# Patient Record
Sex: MALE | Race: BLACK OR AFRICAN AMERICAN | URBAN - METROPOLITAN AREA
[De-identification: names, ages, dates, MRNs, and addresses within clinical notes are randomized per-mention and may not be internally consistent; named-entity substitution may affect disease eponyms.]

---

## 2020-05-22 DIAGNOSIS — Z20.828 EXPOSURE TO SARS-ASSOCIATED CORONAVIRUS: Primary | ICD-10-CM

## 2020-05-22 PROCEDURE — NC001 PR NO CHARGE: Performed by: INTERNAL MEDICINE

## 2020-05-23 DIAGNOSIS — Z20.828 EXPOSURE TO SARS-ASSOCIATED CORONAVIRUS: ICD-10-CM

## 2020-05-23 PROCEDURE — U0003 INFECTIOUS AGENT DETECTION BY NUCLEIC ACID (DNA OR RNA); SEVERE ACUTE RESPIRATORY SYNDROME CORONAVIRUS 2 (SARS-COV-2) (CORONAVIRUS DISEASE [COVID-19]), AMPLIFIED PROBE TECHNIQUE, MAKING USE OF HIGH THROUGHPUT TECHNOLOGIES AS DESCRIBED BY CMS-2020-01-R: HCPCS

## 2020-05-26 LAB — SARS-COV-2 RNA SPEC QL NAA+PROBE: NOT DETECTED

## 2021-06-21 DIAGNOSIS — I10 HYPERTENSION, UNSPECIFIED TYPE: ICD-10-CM

## 2021-06-21 DIAGNOSIS — Z00.00 PHYSICAL EXAM: Primary | ICD-10-CM

## 2021-06-28 ENCOUNTER — APPOINTMENT (OUTPATIENT)
Dept: LAB | Facility: AMBULARY SURGERY CENTER | Age: 51
End: 2021-06-28
Payer: COMMERCIAL

## 2021-06-28 ENCOUNTER — OFFICE VISIT (OUTPATIENT)
Dept: FAMILY MEDICINE CLINIC | Facility: CLINIC | Age: 51
End: 2021-06-28
Payer: COMMERCIAL

## 2021-06-28 VITALS
DIASTOLIC BLOOD PRESSURE: 100 MMHG | SYSTOLIC BLOOD PRESSURE: 140 MMHG | HEART RATE: 84 BPM | RESPIRATION RATE: 17 BRPM | BODY MASS INDEX: 41.84 KG/M2 | OXYGEN SATURATION: 96 % | HEIGHT: 69 IN | WEIGHT: 282.5 LBS | TEMPERATURE: 98.2 F

## 2021-06-28 DIAGNOSIS — Z28.21 TETANUS, DIPHTHERIA, AND ACELLULAR PERTUSSIS (TDAP) VACCINATION DECLINED: ICD-10-CM

## 2021-06-28 DIAGNOSIS — Z13.31 DEPRESSION SCREENING NEGATIVE: ICD-10-CM

## 2021-06-28 DIAGNOSIS — Z28.21 COVID-19 VACCINATION DECLINED: ICD-10-CM

## 2021-06-28 DIAGNOSIS — I10 HYPERTENSION, UNSPECIFIED TYPE: ICD-10-CM

## 2021-06-28 DIAGNOSIS — Z00.00 PHYSICAL EXAM: ICD-10-CM

## 2021-06-28 DIAGNOSIS — Z00.00 ANNUAL PHYSICAL EXAM: Primary | ICD-10-CM

## 2021-06-28 PROBLEM — I71.2 THORACIC AORTIC ANEURYSM WITHOUT RUPTURE (HCC): Status: ACTIVE | Noted: 2021-06-28

## 2021-06-28 PROBLEM — I71.20 THORACIC AORTIC ANEURYSM WITHOUT RUPTURE: Status: ACTIVE | Noted: 2021-06-28

## 2021-06-28 LAB
ALBUMIN SERPL BCP-MCNC: 3.3 G/DL (ref 3.5–5)
ALP SERPL-CCNC: 80 U/L (ref 46–116)
ALT SERPL W P-5'-P-CCNC: 41 U/L (ref 12–78)
ANION GAP SERPL CALCULATED.3IONS-SCNC: 7 MMOL/L (ref 4–13)
AST SERPL W P-5'-P-CCNC: 27 U/L (ref 5–45)
BASOPHILS # BLD AUTO: 0.02 THOUSANDS/ΜL (ref 0–0.1)
BASOPHILS NFR BLD AUTO: 0 % (ref 0–1)
BILIRUB SERPL-MCNC: 0.51 MG/DL (ref 0.2–1)
BUN SERPL-MCNC: 14 MG/DL (ref 5–25)
CALCIUM ALBUM COR SERPL-MCNC: 9.5 MG/DL (ref 8.3–10.1)
CALCIUM SERPL-MCNC: 8.9 MG/DL (ref 8.3–10.1)
CHLORIDE SERPL-SCNC: 105 MMOL/L (ref 100–108)
CHOLEST SERPL-MCNC: 159 MG/DL (ref 50–200)
CO2 SERPL-SCNC: 23 MMOL/L (ref 21–32)
CREAT SERPL-MCNC: 1.26 MG/DL (ref 0.6–1.3)
EOSINOPHIL # BLD AUTO: 0.28 THOUSAND/ΜL (ref 0–0.61)
EOSINOPHIL NFR BLD AUTO: 6 % (ref 0–6)
ERYTHROCYTE [DISTWIDTH] IN BLOOD BY AUTOMATED COUNT: 14.5 % (ref 11.6–15.1)
GFR SERPL CREATININE-BSD FRML MDRD: 77 ML/MIN/1.73SQ M
GLUCOSE P FAST SERPL-MCNC: 100 MG/DL (ref 65–99)
HCT VFR BLD AUTO: 49.2 % (ref 36.5–49.3)
HDLC SERPL-MCNC: 46 MG/DL
HGB BLD-MCNC: 16.1 G/DL (ref 12–17)
IMM GRANULOCYTES # BLD AUTO: 0.02 THOUSAND/UL (ref 0–0.2)
IMM GRANULOCYTES NFR BLD AUTO: 0 % (ref 0–2)
LDLC SERPL CALC-MCNC: 103 MG/DL (ref 0–100)
LYMPHOCYTES # BLD AUTO: 1.85 THOUSANDS/ΜL (ref 0.6–4.47)
LYMPHOCYTES NFR BLD AUTO: 39 % (ref 14–44)
MCH RBC QN AUTO: 26.4 PG (ref 26.8–34.3)
MCHC RBC AUTO-ENTMCNC: 32.7 G/DL (ref 31.4–37.4)
MCV RBC AUTO: 81 FL (ref 82–98)
MONOCYTES # BLD AUTO: 0.49 THOUSAND/ΜL (ref 0.17–1.22)
MONOCYTES NFR BLD AUTO: 10 % (ref 4–12)
NEUTROPHILS # BLD AUTO: 2.15 THOUSANDS/ΜL (ref 1.85–7.62)
NEUTS SEG NFR BLD AUTO: 45 % (ref 43–75)
NONHDLC SERPL-MCNC: 113 MG/DL
NRBC BLD AUTO-RTO: 0 /100 WBCS
PLATELET # BLD AUTO: 235 THOUSANDS/UL (ref 149–390)
PMV BLD AUTO: 10.3 FL (ref 8.9–12.7)
POTASSIUM SERPL-SCNC: 3.5 MMOL/L (ref 3.5–5.3)
PROT SERPL-MCNC: 8 G/DL (ref 6.4–8.2)
RBC # BLD AUTO: 6.09 MILLION/UL (ref 3.88–5.62)
SODIUM SERPL-SCNC: 135 MMOL/L (ref 136–145)
TRIGL SERPL-MCNC: 49 MG/DL
TSH SERPL DL<=0.05 MIU/L-ACNC: 1.5 UIU/ML (ref 0.36–3.74)
WBC # BLD AUTO: 4.81 THOUSAND/UL (ref 4.31–10.16)

## 2021-06-28 PROCEDURE — 80061 LIPID PANEL: CPT

## 2021-06-28 PROCEDURE — 36415 COLL VENOUS BLD VENIPUNCTURE: CPT

## 2021-06-28 PROCEDURE — 84443 ASSAY THYROID STIM HORMONE: CPT

## 2021-06-28 PROCEDURE — 99396 PREV VISIT EST AGE 40-64: CPT | Performed by: INTERNAL MEDICINE

## 2021-06-28 PROCEDURE — 3008F BODY MASS INDEX DOCD: CPT | Performed by: INTERNAL MEDICINE

## 2021-06-28 PROCEDURE — 3725F SCREEN DEPRESSION PERFORMED: CPT | Performed by: INTERNAL MEDICINE

## 2021-06-28 PROCEDURE — 85025 COMPLETE CBC W/AUTO DIFF WBC: CPT

## 2021-06-28 PROCEDURE — 1036F TOBACCO NON-USER: CPT | Performed by: INTERNAL MEDICINE

## 2021-06-28 PROCEDURE — 80053 COMPREHEN METABOLIC PANEL: CPT

## 2021-06-28 RX ORDER — AMLODIPINE BESYLATE 5 MG/1
5 TABLET ORAL DAILY
Qty: 30 TABLET | Refills: 5 | Status: SHIPPED | OUTPATIENT
Start: 2021-06-28 | End: 2022-01-14

## 2021-06-28 NOTE — PATIENT INSTRUCTIONS

## 2021-06-28 NOTE — PROGRESS NOTES
[facility]ICINE    NAME: Tabby Kenyon Sr   AGE: 54 y o  SEX: male  : 1970     DATE: 2021     Assessment and Plan:     Problem List Items Addressed This Visit        Cardiovascular and Mediastinum    Hypertension    Relevant Medications    amLODIPine (NORVASC) 5 mg tablet      Other Visit Diagnoses     Annual physical exam    -  Primary    BMI 40 0-44 9, adult ([facility] [address] )        COVID-19 vaccination declined        Tetanus, diphtheria, and acellular pertussis (Tdap) vaccination declined        Depression screening negative              Immunizations and preventive care screenings were discussed with patient today  Appropriate education was printed on patient's after visit summary  Counseling:  Alcohol/drug use: discussed moderation in alcohol intake, the recommendations for healthy alcohol use, and avoidance of illicit drug use  · Exercise: the importance of regular exercise/physical activity was discussed  Recommend exercise 3-5 times per week for at least 30 minutes  all labs reviewed with patient    Return in about 1 year (around 2022) for Annual physical      Chief Complaint:     Chief Complaint   Patient presents with    Physical Exam      History of Present Illness:     Adult Annual Physical   Patient here for a comprehensive physical exam  The patient reports no problems  Diet and Physical Activity  · Diet/Nutrition: low carb diet  · Exercise: no formal exercise  Depression Screening  PHQ-9 Depression Screening    PHQ-9:   Frequency of the following problems over the past two weeks:      Little interest or pleasure in doing things: 0 - not at all  Feeling down, depressed, or hopeless: 0 - not at all  PHQ-2 Score: 0       General Health  · Sleep: sleeps well  · Hearing: normal - bilateral   · Vision: no vision problems  · Dental: regular dental visits          Health  · Symptoms include: none     Review of Systems:     Review of Systems   Constitutional: Negative  HENT: Negative  Eyes: Negative  Respiratory: Negative  Cardiovascular: Negative  Gastrointestinal: Negative  Genitourinary: Negative  Musculoskeletal: Negative  Neurological: Negative  Hematological: Negative  Psychiatric/Behavioral: Negative  Past Medical History:     Past Medical History:   Diagnosis Date    Aneurysm of thoracic aorta (HCC)     Cardiomegaly     HBP (high blood pressure)     Hyperglycemia     Low back pain     Meralgia paresthetica, right lower limb     meralgia paresthetica of right leg    Obesity     Seasonal allergies     Vitamin D deficiency     Wears glasses       Past Surgical History:     Past Surgical History:   Procedure Laterality Date    NO PAST SURGERIES        Family History:     Family History   Problem Relation Age of Onset    Breast cancer Sister     Diabetes Brother     Blindness Mother     No Known Problems Father     Sickle cell trait Son     Stroke Maternal Aunt     Down syndrome Sister       Social History:     Social History     Socioeconomic History    Marital status: /Civil Union     Spouse name: None    Number of children: None    Years of education: None    Highest education level: None   Occupational History    None   Tobacco Use    Smoking status: Never Smoker    Smokeless tobacco: Never Used   Vaping Use    Vaping Use: Never used   Substance and Sexual Activity    Alcohol use:  Yes    Drug use: Never    Sexual activity: Yes     Partners: Female   Other Topics Concern    None   Social History Narrative    Most recent tobacco use screenin2019    Do you currently or have you served in the [employer]: No    Were you activated, into active duty, as a member of the [employer] or as a Reservist: No    Marital status:     Sexual orientation: Heterosexual    Exercise level: None    Diet: Regular Alcohol intake: Moderate    Caffeine intake: None    Chewing tobacco: none    Illicit drugs: none    Guns present in home: No    Seat belts used routinely: Yes    Sunscreen used routinely: No    Smoke alarm in home: Yes    Advance directive: No    Live alone or with others: with others    Are there stairs in your home: Yes    International travel: yes    Pets: No     Social Determinants of Health     Financial Resource Strain:     Difficulty of Paying Living Expenses:    Food Insecurity:     Worried About Running Out of Food in the Last Year:     Ran Out of Food in the Last Year:    Transportation Needs:     Lack of Transportation (Medical):  Lack of Transportation (Non-Medical):    Physical Activity:     Days of Exercise per Week:     Minutes of Exercise per Session:    Stress:     Feeling of Stress :    Social Connections:     Frequency of Communication with Friends and Family:     Frequency of Social Gatherings with Friends and Family:     Attends Rastafari Services:     Active Member of Clubs or Organizations:     Attends Club or Organization Meetings:     Marital Status:    Intimate Partner Violence:     Fear of Current or Ex-Partner:     Emotionally Abused:     Physically Abused:     Sexually Abused:       Current Medications:     Current Outpatient Medications   Medication Sig Dispense Refill    amLODIPine (NORVASC) 5 mg tablet Take 1 tablet (5 mg total) by mouth daily 30 tablet 5     No current facility-administered medications for this visit  Allergies:     No Known Allergies   Physical Exam:     /100 (BP Location: Right arm, Patient Position: Sitting, Cuff Size: Adult)   Pulse 84   Temp 98 2 °F (36 8 °C) (Temporal)   Resp 17   Ht 5' 9" (1 753 m)   Wt 128 kg (282 lb 8 oz)   SpO2 96%   BMI 41 72 kg/m²     Physical Exam  Constitutional:       Appearance: He is obese  HENT:      Head: Normocephalic and atraumatic        Right Ear: External ear normal       Left Ear: External ear normal       Nose: Nose normal       Mouth/Throat:      Pharynx: Oropharynx is clear  Eyes:      Extraocular Movements: Extraocular movements intact  Pupils: Pupils are equal, round, and reactive to light  Cardiovascular:      Rate and Rhythm: Normal rate and regular rhythm  Pulmonary:      Effort: Pulmonary effort is normal       Breath sounds: Normal breath sounds  Abdominal:      General: Abdomen is flat  Musculoskeletal:         General: Normal range of motion  Cervical back: Normal range of motion and neck supple  Skin:     General: Skin is warm  Capillary Refill: Capillary refill takes less than 2 seconds  Neurological:      General: No focal deficit present  Mental Status: He is alert and oriented to person, place, and time  Psychiatric:         Mood and Affect: Mood normal          Behavior: Behavior normal          Thought Content: Thought content normal          Judgment: Judgment normal           Zoraida Nageotte, MD  [facility]   BMI Counseling: Body mass index is 41 72 kg/m²  The BMI is above normal  Nutrition recommendations include reducing portion sizes, decreasing overall calorie intake, 3-5 servings of fruits/vegetables daily, reducing fast food intake, consuming healthier snacks, decreasing soda and/or juice intake, moderation in carbohydrate intake, increasing intake of lean protein, reducing intake of saturated fat and trans fat and reducing intake of cholesterol  Exercise recommendations include vigorous aerobic physical activity for 75 minutes/week, joining a gym and strength training exercises

## 2021-12-11 ENCOUNTER — TELEPHONE (OUTPATIENT)
Dept: FAMILY MEDICINE CLINIC | Facility: CLINIC | Age: 51
End: 2021-12-11

## 2021-12-11 DIAGNOSIS — Z20.828 EXPOSURE TO SARS-ASSOCIATED CORONAVIRUS: Primary | ICD-10-CM

## 2021-12-12 PROCEDURE — U0003 INFECTIOUS AGENT DETECTION BY NUCLEIC ACID (DNA OR RNA); SEVERE ACUTE RESPIRATORY SYNDROME CORONAVIRUS 2 (SARS-COV-2) (CORONAVIRUS DISEASE [COVID-19]), AMPLIFIED PROBE TECHNIQUE, MAKING USE OF HIGH THROUGHPUT TECHNOLOGIES AS DESCRIBED BY CMS-2020-01-R: HCPCS | Performed by: INTERNAL MEDICINE

## 2021-12-12 PROCEDURE — U0005 INFEC AGEN DETEC AMPLI PROBE: HCPCS | Performed by: INTERNAL MEDICINE

## 2021-12-14 ENCOUNTER — TELEMEDICINE (OUTPATIENT)
Dept: FAMILY MEDICINE CLINIC | Facility: CLINIC | Age: 51
End: 2021-12-14
Payer: COMMERCIAL

## 2021-12-14 DIAGNOSIS — E66.01 CLASS 3 SEVERE OBESITY DUE TO EXCESS CALORIES WITH SERIOUS COMORBIDITY AND BODY MASS INDEX (BMI) OF 40.0 TO 44.9 IN ADULT (HCC): ICD-10-CM

## 2021-12-14 DIAGNOSIS — U07.1 TELEHEALTH ENCOUNTER FOR CONFIRMED COVID-19: Primary | ICD-10-CM

## 2021-12-14 DIAGNOSIS — R11.0 NAUSEA: ICD-10-CM

## 2021-12-14 DIAGNOSIS — I10 HYPERTENSION, UNSPECIFIED TYPE: ICD-10-CM

## 2021-12-14 PROBLEM — E66.813 CLASS 3 SEVERE OBESITY IN ADULT (HCC): Status: ACTIVE | Noted: 2021-12-14

## 2021-12-14 PROCEDURE — 1036F TOBACCO NON-USER: CPT | Performed by: INTERNAL MEDICINE

## 2021-12-14 PROCEDURE — 99214 OFFICE O/P EST MOD 30 MIN: CPT | Performed by: INTERNAL MEDICINE

## 2021-12-14 RX ORDER — ACETAMINOPHEN 325 MG/1
650 TABLET ORAL ONCE AS NEEDED
Status: CANCELLED | OUTPATIENT
Start: 2021-12-14

## 2021-12-14 RX ORDER — SODIUM CHLORIDE 9 MG/ML
20 INJECTION, SOLUTION INTRAVENOUS ONCE
Status: CANCELLED | OUTPATIENT
Start: 2021-12-14

## 2021-12-14 RX ORDER — ONDANSETRON 2 MG/ML
4 INJECTION INTRAMUSCULAR; INTRAVENOUS ONCE AS NEEDED
Status: CANCELLED | OUTPATIENT
Start: 2021-12-14

## 2021-12-14 RX ORDER — ALBUTEROL SULFATE 90 UG/1
3 AEROSOL, METERED RESPIRATORY (INHALATION) ONCE AS NEEDED
Status: CANCELLED | OUTPATIENT
Start: 2021-12-14

## 2021-12-14 RX ORDER — ONDANSETRON 4 MG/1
4 TABLET, FILM COATED ORAL EVERY 8 HOURS PRN
Qty: 30 TABLET | Refills: 0 | Status: SHIPPED | OUTPATIENT
Start: 2021-12-14 | End: 2022-03-09

## 2021-12-16 ENCOUNTER — APPOINTMENT (EMERGENCY)
Dept: CT IMAGING | Facility: HOSPITAL | Age: 51
End: 2021-12-16
Payer: COMMERCIAL

## 2021-12-16 ENCOUNTER — HOSPITAL ENCOUNTER (INPATIENT)
Facility: HOSPITAL | Age: 51
LOS: 11 days | Discharge: LEFT BEFORE DISCHARGE | DRG: 177 | End: 2021-12-27
Attending: INTERNAL MEDICINE | Admitting: INTERNAL MEDICINE
Payer: COMMERCIAL

## 2021-12-16 ENCOUNTER — HOSPITAL ENCOUNTER (OUTPATIENT)
Dept: INFUSION CENTER | Facility: HOSPITAL | Age: 51
Discharge: HOME | End: 2021-12-16
Attending: INTERNAL MEDICINE

## 2021-12-16 ENCOUNTER — APPOINTMENT (EMERGENCY)
Dept: RADIOLOGY | Facility: HOSPITAL | Age: 51
End: 2021-12-16
Payer: COMMERCIAL

## 2021-12-16 ENCOUNTER — HOSPITAL ENCOUNTER (EMERGENCY)
Facility: HOSPITAL | Age: 51
End: 2021-12-16
Attending: INTERNAL MEDICINE
Payer: COMMERCIAL

## 2021-12-16 VITALS
DIASTOLIC BLOOD PRESSURE: 81 MMHG | HEART RATE: 125 BPM | OXYGEN SATURATION: 95 % | SYSTOLIC BLOOD PRESSURE: 169 MMHG | TEMPERATURE: 99.7 F | RESPIRATION RATE: 62 BRPM

## 2021-12-16 VITALS
TEMPERATURE: 98.1 F | SYSTOLIC BLOOD PRESSURE: 127 MMHG | OXYGEN SATURATION: 93 % | DIASTOLIC BLOOD PRESSURE: 83 MMHG | HEART RATE: 106 BPM | RESPIRATION RATE: 48 BRPM

## 2021-12-16 DIAGNOSIS — U07.1 COVID-19: ICD-10-CM

## 2021-12-16 DIAGNOSIS — U07.1 ACUTE HYPOXEMIC RESPIRATORY FAILURE DUE TO COVID-19 (HCC): Primary | ICD-10-CM

## 2021-12-16 DIAGNOSIS — U07.1 PNEUMONIA DUE TO COVID-19 VIRUS: ICD-10-CM

## 2021-12-16 DIAGNOSIS — I10 HYPERTENSION, UNSPECIFIED TYPE: ICD-10-CM

## 2021-12-16 DIAGNOSIS — R07.9 CHEST PAIN, UNSPECIFIED TYPE: ICD-10-CM

## 2021-12-16 DIAGNOSIS — R06.02 SHORTNESS OF BREATH: Primary | ICD-10-CM

## 2021-12-16 DIAGNOSIS — J96.01 ACUTE RESPIRATORY FAILURE WITH HYPOXEMIA (HCC): Primary | ICD-10-CM

## 2021-12-16 DIAGNOSIS — E66.01 CLASS 3 SEVERE OBESITY DUE TO EXCESS CALORIES WITH SERIOUS COMORBIDITY AND BODY MASS INDEX (BMI) OF 40.0 TO 44.9 IN ADULT (HCC): ICD-10-CM

## 2021-12-16 DIAGNOSIS — J12.82 PNEUMONIA DUE TO COVID-19 VIRUS: ICD-10-CM

## 2021-12-16 DIAGNOSIS — M54.9 ACUTE BACK PAIN, UNSPECIFIED BACK LOCATION, UNSPECIFIED BACK PAIN LATERALITY: ICD-10-CM

## 2021-12-16 DIAGNOSIS — J96.01 ACUTE HYPOXEMIC RESPIRATORY FAILURE DUE TO COVID-19 (HCC): Primary | ICD-10-CM

## 2021-12-16 PROBLEM — E66.813 CLASS 3 SEVERE OBESITY IN ADULT (HCC): Chronic | Status: ACTIVE | Noted: 2021-12-14

## 2021-12-16 PROBLEM — I71.20 THORACIC AORTIC ANEURYSM WITHOUT RUPTURE: Chronic | Status: ACTIVE | Noted: 2021-06-28

## 2021-12-16 PROBLEM — I71.2 THORACIC AORTIC ANEURYSM WITHOUT RUPTURE (HCC): Chronic | Status: ACTIVE | Noted: 2021-06-28

## 2021-12-16 LAB
2HR DELTA HS TROPONIN: 0 NG/L
4HR DELTA HS TROPONIN: 2 NG/L
ALBUMIN SERPL BCP-MCNC: 3.7 G/DL (ref 3.4–4.8)
ALP SERPL-CCNC: 49.3 U/L (ref 10–129)
ALT SERPL W P-5'-P-CCNC: 27 U/L (ref 5–63)
ANION GAP SERPL CALCULATED.3IONS-SCNC: 9 MMOL/L (ref 4–13)
AST SERPL W P-5'-P-CCNC: 40 U/L (ref 15–41)
ATRIAL RATE: 120 BPM
BASE EX.OXY STD BLDV CALC-SCNC: 84.4 % (ref 60–80)
BASE EXCESS BLDV CALC-SCNC: 1.4 MMOL/L
BASOPHILS # BLD AUTO: 0.03 THOUSANDS/ΜL (ref 0–0.1)
BASOPHILS NFR BLD AUTO: 1 % (ref 0–1)
BILIRUB SERPL-MCNC: 0.72 MG/DL (ref 0.3–1.2)
BNP SERPL-MCNC: 15.8 PG/ML (ref 1–100)
BUN SERPL-MCNC: 20 MG/DL (ref 6–20)
CALCIUM SERPL-MCNC: 8.6 MG/DL (ref 8.4–10.2)
CARDIAC TROPONIN I PNL SERPL HS: 18 NG/L
CARDIAC TROPONIN I PNL SERPL HS: 18 NG/L
CARDIAC TROPONIN I PNL SERPL HS: 20 NG/L
CHLORIDE SERPL-SCNC: 97 MMOL/L (ref 96–108)
CK MB SERPL-MCNC: 0.7 % (ref 0–2.5)
CK MB SERPL-MCNC: 3.3 NG/ML (ref 0.1–5.9)
CK SERPL-CCNC: 472.1 U/L (ref 49–397)
CO2 SERPL-SCNC: 27 MMOL/L (ref 22–33)
CREAT SERPL-MCNC: 1.33 MG/DL (ref 0.5–1.2)
CRP SERPL QL: 18 MG/DL (ref 0–1)
D DIMER PPP FEU-MCNC: 4 UG/ML FEU
EOSINOPHIL # BLD AUTO: 0.01 THOUSAND/ΜL (ref 0–0.61)
EOSINOPHIL NFR BLD AUTO: 0 % (ref 0–6)
ERYTHROCYTE [DISTWIDTH] IN BLOOD BY AUTOMATED COUNT: 14 % (ref 11.6–15.1)
GFR SERPL CREATININE-BSD FRML MDRD: 62 ML/MIN/1.73SQ M
GLUCOSE SERPL-MCNC: 183 MG/DL (ref 65–140)
HCO3 BLDV-SCNC: 25.3 MMOL/L (ref 24–30)
HCT VFR BLD AUTO: 50.7 % (ref 36.5–49.3)
HGB BLD-MCNC: 17.2 G/DL (ref 12–17)
IMM GRANULOCYTES # BLD AUTO: 0.01 THOUSAND/UL (ref 0–0.2)
IMM GRANULOCYTES NFR BLD AUTO: 0 % (ref 0–2)
LACTATE SERPL-SCNC: 1.5 MMOL/L (ref 0–2)
LYMPHOCYTES # BLD AUTO: 0.66 THOUSANDS/ΜL (ref 0.6–4.47)
LYMPHOCYTES NFR BLD AUTO: 13 % (ref 14–44)
MCH RBC QN AUTO: 26.6 PG (ref 26.8–34.3)
MCHC RBC AUTO-ENTMCNC: 33.9 G/DL (ref 31.4–37.4)
MCV RBC AUTO: 78 FL (ref 82–98)
MONOCYTES # BLD AUTO: 0.41 THOUSAND/ΜL (ref 0.17–1.22)
MONOCYTES NFR BLD AUTO: 8 % (ref 4–12)
NEUTROPHILS # BLD AUTO: 3.99 THOUSANDS/ΜL (ref 1.85–7.62)
NEUTS SEG NFR BLD AUTO: 78 % (ref 43–75)
NRBC BLD AUTO-RTO: 0 /100 WBCS
O2 CT BLDV-SCNC: 21.7 ML/DL
P AXIS: 50 DEGREES
PCO2 BLDV: 38 MM HG (ref 42–50)
PH BLDV: 7.44 [PH] (ref 7.3–7.4)
PLATELET # BLD AUTO: 182 THOUSANDS/UL (ref 149–390)
PMV BLD AUTO: 10.2 FL (ref 8.9–12.7)
PO2 BLDV: 48.8 MM HG (ref 35–45)
POTASSIUM SERPL-SCNC: 3.7 MMOL/L (ref 3.5–5)
PR INTERVAL: 127 MS
PROCALCITONIN SERPL-MCNC: 0.39 NG/ML
PROT SERPL-MCNC: 7.8 G/DL (ref 6.4–8.3)
QRS AXIS: 61 DEGREES
QRSD INTERVAL: 85 MS
QT INTERVAL: 340 MS
QTC INTERVAL: 483 MS
RBC # BLD AUTO: 6.47 MILLION/UL (ref 3.88–5.62)
SODIUM SERPL-SCNC: 133 MMOL/L (ref 133–145)
T WAVE AXIS: 16 DEGREES
VENTRICULAR RATE: 121 BPM
WBC # BLD AUTO: 5.11 THOUSAND/UL (ref 4.31–10.16)

## 2021-12-16 PROCEDURE — 82948 REAGENT STRIP/BLOOD GLUCOSE: CPT

## 2021-12-16 PROCEDURE — 36415 COLL VENOUS BLD VENIPUNCTURE: CPT | Performed by: PHYSICIAN ASSISTANT

## 2021-12-16 PROCEDURE — 99285 EMERGENCY DEPT VISIT HI MDM: CPT | Performed by: PHYSICIAN ASSISTANT

## 2021-12-16 PROCEDURE — 99285 EMERGENCY DEPT VISIT HI MDM: CPT

## 2021-12-16 PROCEDURE — 83605 ASSAY OF LACTIC ACID: CPT | Performed by: PHYSICIAN ASSISTANT

## 2021-12-16 PROCEDURE — 87040 BLOOD CULTURE FOR BACTERIA: CPT | Performed by: NURSE PRACTITIONER

## 2021-12-16 PROCEDURE — 83880 ASSAY OF NATRIURETIC PEPTIDE: CPT | Performed by: PHYSICIAN ASSISTANT

## 2021-12-16 PROCEDURE — 86140 C-REACTIVE PROTEIN: CPT | Performed by: NURSE PRACTITIONER

## 2021-12-16 PROCEDURE — 71275 CT ANGIOGRAPHY CHEST: CPT

## 2021-12-16 PROCEDURE — 94760 N-INVAS EAR/PLS OXIMETRY 1: CPT

## 2021-12-16 PROCEDURE — 82553 CREATINE MB FRACTION: CPT | Performed by: NURSE PRACTITIONER

## 2021-12-16 PROCEDURE — 85379 FIBRIN DEGRADATION QUANT: CPT | Performed by: PHYSICIAN ASSISTANT

## 2021-12-16 PROCEDURE — 80053 COMPREHEN METABOLIC PANEL: CPT | Performed by: PHYSICIAN ASSISTANT

## 2021-12-16 PROCEDURE — 84484 ASSAY OF TROPONIN QUANT: CPT | Performed by: PHYSICIAN ASSISTANT

## 2021-12-16 PROCEDURE — 93010 ELECTROCARDIOGRAM REPORT: CPT | Performed by: INTERNAL MEDICINE

## 2021-12-16 PROCEDURE — 82805 BLOOD GASES W/O2 SATURATION: CPT | Performed by: PHYSICIAN ASSISTANT

## 2021-12-16 PROCEDURE — 94664 DEMO&/EVAL PT USE INHALER: CPT

## 2021-12-16 PROCEDURE — 87040 BLOOD CULTURE FOR BACTERIA: CPT | Performed by: PHYSICIAN ASSISTANT

## 2021-12-16 PROCEDURE — 94003 VENT MGMT INPAT SUBQ DAY: CPT

## 2021-12-16 PROCEDURE — 85025 COMPLETE CBC W/AUTO DIFF WBC: CPT | Performed by: PHYSICIAN ASSISTANT

## 2021-12-16 PROCEDURE — 82550 ASSAY OF CK (CPK): CPT | Performed by: NURSE PRACTITIONER

## 2021-12-16 PROCEDURE — 96374 THER/PROPH/DIAG INJ IV PUSH: CPT

## 2021-12-16 PROCEDURE — 99291 CRITICAL CARE FIRST HOUR: CPT | Performed by: NURSE PRACTITIONER

## 2021-12-16 PROCEDURE — XW0DXM6 INTRODUCTION OF BARICITINIB INTO MOUTH AND PHARYNX, EXTERNAL APPROACH, NEW TECHNOLOGY GROUP 6: ICD-10-PCS | Performed by: INTERNAL MEDICINE

## 2021-12-16 PROCEDURE — 93005 ELECTROCARDIOGRAM TRACING: CPT

## 2021-12-16 PROCEDURE — 84145 PROCALCITONIN (PCT): CPT | Performed by: PHYSICIAN ASSISTANT

## 2021-12-16 PROCEDURE — 71045 X-RAY EXAM CHEST 1 VIEW: CPT

## 2021-12-16 RX ORDER — ATORVASTATIN CALCIUM 40 MG/1
40 TABLET, FILM COATED ORAL
Status: DISCONTINUED | OUTPATIENT
Start: 2021-12-16 | End: 2021-12-27 | Stop reason: HOSPADM

## 2021-12-16 RX ORDER — DEXAMETHASONE SODIUM PHOSPHATE 10 MG/ML
10 INJECTION, SOLUTION INTRAMUSCULAR; INTRAVENOUS ONCE
Status: COMPLETED | OUTPATIENT
Start: 2021-12-16 | End: 2021-12-16

## 2021-12-16 RX ORDER — DOXYCYCLINE HYCLATE 100 MG/1
100 CAPSULE ORAL EVERY 12 HOURS
Status: COMPLETED | OUTPATIENT
Start: 2021-12-16 | End: 2021-12-21

## 2021-12-16 RX ORDER — CEFTRIAXONE 1 G/50ML
1000 INJECTION, SOLUTION INTRAVENOUS EVERY 24 HOURS
Status: COMPLETED | OUTPATIENT
Start: 2021-12-16 | End: 2021-12-20

## 2021-12-16 RX ORDER — AMOXICILLIN 250 MG
2 CAPSULE ORAL 2 TIMES DAILY
Status: DISCONTINUED | OUTPATIENT
Start: 2021-12-16 | End: 2021-12-27 | Stop reason: HOSPADM

## 2021-12-16 RX ORDER — AMLODIPINE BESYLATE 5 MG/1
5 TABLET ORAL DAILY
Status: DISCONTINUED | OUTPATIENT
Start: 2021-12-17 | End: 2021-12-27 | Stop reason: HOSPADM

## 2021-12-16 RX ADMIN — BARICITINIB 4 MG: 2 TABLET, FILM COATED ORAL at 13:55

## 2021-12-16 RX ADMIN — BARICITINIB 4 MG: 2 TABLET, FILM COATED ORAL at 20:04

## 2021-12-16 RX ADMIN — DOXYCYCLINE 100 MG: 100 CAPSULE ORAL at 20:04

## 2021-12-16 RX ADMIN — IOHEXOL 85 ML: 350 INJECTION, SOLUTION INTRAVENOUS at 11:31

## 2021-12-16 RX ADMIN — DOCUSATE SODIUM AND SENNOSIDES 2 TABLET: 8.6; 5 TABLET ORAL at 20:04

## 2021-12-16 RX ADMIN — DEXAMETHASONE SODIUM PHOSPHATE 10 MG: 10 INJECTION, SOLUTION INTRAMUSCULAR; INTRAVENOUS at 10:02

## 2021-12-16 RX ADMIN — DEXAMETHASONE SODIUM PHOSPHATE 12.2 MG: 10 INJECTION, SOLUTION INTRAMUSCULAR; INTRAVENOUS at 20:11

## 2021-12-16 RX ADMIN — ENOXAPARIN SODIUM 30 MG: 30 INJECTION SUBCUTANEOUS at 20:58

## 2021-12-16 RX ADMIN — CEFTRIAXONE 1000 MG: 1 INJECTION, SOLUTION INTRAVENOUS at 20:11

## 2021-12-16 RX ADMIN — ATORVASTATIN CALCIUM 40 MG: 40 TABLET, FILM COATED ORAL at 21:00

## 2021-12-17 ENCOUNTER — APPOINTMENT (INPATIENT)
Dept: RADIOLOGY | Facility: HOSPITAL | Age: 51
DRG: 177 | End: 2021-12-17
Payer: COMMERCIAL

## 2021-12-17 PROBLEM — N17.9 AKI (ACUTE KIDNEY INJURY) (HCC): Status: ACTIVE | Noted: 2021-12-17

## 2021-12-17 LAB
ALBUMIN SERPL BCP-MCNC: 2.5 G/DL (ref 3.5–5)
ALP SERPL-CCNC: 57 U/L (ref 46–116)
ALT SERPL W P-5'-P-CCNC: 41 U/L (ref 12–78)
ANION GAP SERPL CALCULATED.3IONS-SCNC: 10 MMOL/L (ref 4–13)
ANISOCYTOSIS BLD QL SMEAR: PRESENT
ARTERIAL PATENCY WRIST A: YES
AST SERPL W P-5'-P-CCNC: 38 U/L (ref 5–45)
BASE EXCESS BLDA CALC-SCNC: -0.7 MMOL/L
BASOPHILS # BLD MANUAL: 0 THOUSAND/UL (ref 0–0.1)
BASOPHILS NFR MAR MANUAL: 0 % (ref 0–1)
BILIRUB SERPL-MCNC: 0.5 MG/DL (ref 0.2–1)
BUN SERPL-MCNC: 24 MG/DL (ref 5–25)
CALCIUM ALBUM COR SERPL-MCNC: 9.4 MG/DL (ref 8.3–10.1)
CALCIUM SERPL-MCNC: 8.2 MG/DL (ref 8.3–10.1)
CHLORIDE SERPL-SCNC: 100 MMOL/L (ref 100–108)
CO2 SERPL-SCNC: 25 MMOL/L (ref 21–32)
CREAT SERPL-MCNC: 1.31 MG/DL (ref 0.6–1.3)
CRP SERPL QL: 139.7 MG/L
EOSINOPHIL # BLD MANUAL: 0 THOUSAND/UL (ref 0–0.4)
EOSINOPHIL NFR BLD MANUAL: 0 % (ref 0–6)
ERYTHROCYTE [DISTWIDTH] IN BLOOD BY AUTOMATED COUNT: 13.4 % (ref 11.6–15.1)
GFR SERPL CREATININE-BSD FRML MDRD: 63 ML/MIN/1.73SQ M
GLUCOSE SERPL-MCNC: 152 MG/DL (ref 65–140)
GLUCOSE SERPL-MCNC: 172 MG/DL (ref 65–140)
HCO3 BLDA-SCNC: 22.3 MMOL/L (ref 22–28)
HCT VFR BLD AUTO: 50.1 % (ref 36.5–49.3)
HGB BLD-MCNC: 16.6 G/DL (ref 12–17)
LYMPHOCYTES # BLD AUTO: 0.46 THOUSAND/UL (ref 0.6–4.47)
LYMPHOCYTES # BLD AUTO: 11 % (ref 14–44)
MAGNESIUM SERPL-MCNC: 3.3 MG/DL (ref 1.6–2.6)
MCH RBC QN AUTO: 26.1 PG (ref 26.8–34.3)
MCHC RBC AUTO-ENTMCNC: 33.1 G/DL (ref 31.4–37.4)
MCV RBC AUTO: 79 FL (ref 82–98)
MICROCYTES BLD QL AUTO: PRESENT
MONOCYTES # BLD AUTO: 0.17 THOUSAND/UL (ref 0–1.22)
MONOCYTES NFR BLD: 4 % (ref 4–12)
NEUTROPHILS # BLD MANUAL: 3.41 THOUSAND/UL (ref 1.85–7.62)
NEUTS BAND NFR BLD MANUAL: 1 % (ref 0–8)
NEUTS SEG NFR BLD AUTO: 81 % (ref 43–75)
O2 CT BLDA-SCNC: 22.7 ML/DL (ref 16–23)
OXYHGB MFR BLDA: 92.8 % (ref 94–97)
PCO2 BLDA: 32.9 MM HG (ref 36–44)
PH BLDA: 7.45 [PH] (ref 7.35–7.45)
PLASMA CELLS NFR BLD: 1 % (ref 0–0)
PLATELET # BLD AUTO: 222 THOUSANDS/UL (ref 149–390)
PLATELET BLD QL SMEAR: ADEQUATE
PMV BLD AUTO: 10 FL (ref 8.9–12.7)
PO2 BLDA: 66.2 MM HG (ref 75–129)
POTASSIUM SERPL-SCNC: 4.1 MMOL/L (ref 3.5–5.3)
PROCALCITONIN SERPL-MCNC: 0.35 NG/ML
PROT SERPL-MCNC: 8 G/DL (ref 6.4–8.2)
RBC # BLD AUTO: 6.36 MILLION/UL (ref 3.88–5.62)
RBC MORPH BLD: PRESENT
SODIUM SERPL-SCNC: 135 MMOL/L (ref 136–145)
SPECIMEN SOURCE: ABNORMAL
VARIANT LYMPHS # BLD AUTO: 2 %
WBC # BLD AUTO: 4.16 THOUSAND/UL (ref 4.31–10.16)

## 2021-12-17 PROCEDURE — 87449 NOS EACH ORGANISM AG IA: CPT | Performed by: NURSE PRACTITIONER

## 2021-12-17 PROCEDURE — 94003 VENT MGMT INPAT SUBQ DAY: CPT

## 2021-12-17 PROCEDURE — 99232 SBSQ HOSP IP/OBS MODERATE 35: CPT | Performed by: INTERNAL MEDICINE

## 2021-12-17 PROCEDURE — 86140 C-REACTIVE PROTEIN: CPT | Performed by: NURSE PRACTITIONER

## 2021-12-17 PROCEDURE — 94760 N-INVAS EAR/PLS OXIMETRY 1: CPT

## 2021-12-17 PROCEDURE — 82805 BLOOD GASES W/O2 SATURATION: CPT | Performed by: NURSE PRACTITIONER

## 2021-12-17 PROCEDURE — 85027 COMPLETE CBC AUTOMATED: CPT | Performed by: NURSE PRACTITIONER

## 2021-12-17 PROCEDURE — 36600 WITHDRAWAL OF ARTERIAL BLOOD: CPT

## 2021-12-17 PROCEDURE — 94640 AIRWAY INHALATION TREATMENT: CPT

## 2021-12-17 PROCEDURE — 85007 BL SMEAR W/DIFF WBC COUNT: CPT | Performed by: NURSE PRACTITIONER

## 2021-12-17 PROCEDURE — 83735 ASSAY OF MAGNESIUM: CPT | Performed by: NURSE PRACTITIONER

## 2021-12-17 PROCEDURE — 84145 PROCALCITONIN (PCT): CPT | Performed by: NURSE PRACTITIONER

## 2021-12-17 PROCEDURE — 80053 COMPREHEN METABOLIC PANEL: CPT | Performed by: NURSE PRACTITIONER

## 2021-12-17 PROCEDURE — 71045 X-RAY EXAM CHEST 1 VIEW: CPT

## 2021-12-17 RX ORDER — BENZONATATE 100 MG/1
100 CAPSULE ORAL 3 TIMES DAILY PRN
Status: DISCONTINUED | OUTPATIENT
Start: 2021-12-17 | End: 2021-12-27 | Stop reason: HOSPADM

## 2021-12-17 RX ORDER — SODIUM CHLORIDE 30 MG/ML INHALATION SOLUTION 30 MG/ML
4 SOLUTION INHALANT
Status: DISCONTINUED | OUTPATIENT
Start: 2021-12-17 | End: 2021-12-26

## 2021-12-17 RX ORDER — LEVALBUTEROL 1.25 MG/.5ML
SOLUTION, CONCENTRATE RESPIRATORY (INHALATION)
Status: COMPLETED
Start: 2021-12-17 | End: 2021-12-17

## 2021-12-17 RX ORDER — LEVALBUTEROL 1.25 MG/.5ML
1.25 SOLUTION, CONCENTRATE RESPIRATORY (INHALATION)
Status: DISCONTINUED | OUTPATIENT
Start: 2021-12-17 | End: 2021-12-26

## 2021-12-17 RX ORDER — LEVALBUTEROL INHALATION SOLUTION 0.63 MG/3ML
SOLUTION RESPIRATORY (INHALATION)
Status: COMPLETED
Start: 2021-12-17 | End: 2021-12-17

## 2021-12-17 RX ORDER — MAGNESIUM SULFATE HEPTAHYDRATE 40 MG/ML
2 INJECTION, SOLUTION INTRAVENOUS ONCE
Status: DISCONTINUED | OUTPATIENT
Start: 2021-12-17 | End: 2021-12-19

## 2021-12-17 RX ADMIN — BARICITINIB 4 MG: 2 TABLET, FILM COATED ORAL at 18:31

## 2021-12-17 RX ADMIN — LEVALBUTEROL HYDROCHLORIDE 1.25 MG: 1.25 SOLUTION, CONCENTRATE RESPIRATORY (INHALATION) at 09:47

## 2021-12-17 RX ADMIN — DOXYCYCLINE 100 MG: 100 CAPSULE ORAL at 07:59

## 2021-12-17 RX ADMIN — SODIUM CHLORIDE SOLN NEBU 3% 4 ML: 3 NEBU SOLN at 19:47

## 2021-12-17 RX ADMIN — DOCUSATE SODIUM AND SENNOSIDES 2 TABLET: 8.6; 5 TABLET ORAL at 08:00

## 2021-12-17 RX ADMIN — SODIUM CHLORIDE SOLN NEBU 3% 4 ML: 3 NEBU SOLN at 14:17

## 2021-12-17 RX ADMIN — DORNASE ALFA 2.5 MG: 1 SOLUTION RESPIRATORY (INHALATION) at 19:47

## 2021-12-17 RX ADMIN — DOXYCYCLINE 100 MG: 100 CAPSULE ORAL at 18:31

## 2021-12-17 RX ADMIN — ENOXAPARIN SODIUM 30 MG: 30 INJECTION SUBCUTANEOUS at 08:00

## 2021-12-17 RX ADMIN — LEVALBUTEROL HYDROCHLORIDE 1.25 MG: 1.25 SOLUTION, CONCENTRATE RESPIRATORY (INHALATION) at 19:47

## 2021-12-17 RX ADMIN — ENOXAPARIN SODIUM 30 MG: 30 INJECTION SUBCUTANEOUS at 21:14

## 2021-12-17 RX ADMIN — DEXAMETHASONE SODIUM PHOSPHATE 12.2 MG: 10 INJECTION, SOLUTION INTRAMUSCULAR; INTRAVENOUS at 08:00

## 2021-12-17 RX ADMIN — DEXAMETHASONE SODIUM PHOSPHATE 12.2 MG: 10 INJECTION, SOLUTION INTRAMUSCULAR; INTRAVENOUS at 19:36

## 2021-12-17 RX ADMIN — SODIUM CHLORIDE SOLN NEBU 3% 4 ML: 3 NEBU SOLN at 09:47

## 2021-12-17 RX ADMIN — BENZONATATE 100 MG: 100 CAPSULE ORAL at 14:39

## 2021-12-17 RX ADMIN — LEVALBUTEROL HYDROCHLORIDE: 0.63 SOLUTION RESPIRATORY (INHALATION) at 09:53

## 2021-12-17 RX ADMIN — LEVALBUTEROL HYDROCHLORIDE 1.25 MG: 1.25 SOLUTION, CONCENTRATE RESPIRATORY (INHALATION) at 14:17

## 2021-12-17 RX ADMIN — CEFTRIAXONE 1000 MG: 1 INJECTION, SOLUTION INTRAVENOUS at 18:31

## 2021-12-17 RX ADMIN — AMLODIPINE BESYLATE 5 MG: 5 TABLET ORAL at 08:00

## 2021-12-17 RX ADMIN — ATORVASTATIN CALCIUM 40 MG: 40 TABLET, FILM COATED ORAL at 21:14

## 2021-12-18 LAB
ANION GAP SERPL CALCULATED.3IONS-SCNC: 11 MMOL/L (ref 4–13)
BASOPHILS # BLD AUTO: 0.01 THOUSANDS/ΜL (ref 0–0.1)
BASOPHILS NFR BLD AUTO: 0 % (ref 0–1)
BUN SERPL-MCNC: 29 MG/DL (ref 5–25)
CALCIUM SERPL-MCNC: 8.2 MG/DL (ref 8.3–10.1)
CHLORIDE SERPL-SCNC: 100 MMOL/L (ref 100–108)
CO2 SERPL-SCNC: 23 MMOL/L (ref 21–32)
CREAT SERPL-MCNC: 1.48 MG/DL (ref 0.6–1.3)
CRP SERPL QL: 70.5 MG/L
D DIMER PPP FEU-MCNC: 1.41 UG/ML FEU
EOSINOPHIL # BLD AUTO: 0 THOUSAND/ΜL (ref 0–0.61)
EOSINOPHIL NFR BLD AUTO: 0 % (ref 0–6)
ERYTHROCYTE [DISTWIDTH] IN BLOOD BY AUTOMATED COUNT: 13.4 % (ref 11.6–15.1)
GFR SERPL CREATININE-BSD FRML MDRD: 54 ML/MIN/1.73SQ M
GLUCOSE SERPL-MCNC: 157 MG/DL (ref 65–140)
GLUCOSE SERPL-MCNC: 167 MG/DL (ref 65–140)
GLUCOSE SERPL-MCNC: 183 MG/DL (ref 65–140)
GLUCOSE SERPL-MCNC: 224 MG/DL (ref 65–140)
HCT VFR BLD AUTO: 49.3 % (ref 36.5–49.3)
HGB BLD-MCNC: 16 G/DL (ref 12–17)
IMM GRANULOCYTES # BLD AUTO: 0.05 THOUSAND/UL (ref 0–0.2)
IMM GRANULOCYTES NFR BLD AUTO: 0 % (ref 0–2)
L PNEUMO1 AG UR QL IA.RAPID: NEGATIVE
LYMPHOCYTES # BLD AUTO: 1.01 THOUSANDS/ΜL (ref 0.6–4.47)
LYMPHOCYTES NFR BLD AUTO: 9 % (ref 14–44)
MAGNESIUM SERPL-MCNC: 3.3 MG/DL (ref 1.6–2.6)
MCH RBC QN AUTO: 25.7 PG (ref 26.8–34.3)
MCHC RBC AUTO-ENTMCNC: 32.5 G/DL (ref 31.4–37.4)
MCV RBC AUTO: 79 FL (ref 82–98)
MONOCYTES # BLD AUTO: 0.78 THOUSAND/ΜL (ref 0.17–1.22)
MONOCYTES NFR BLD AUTO: 7 % (ref 4–12)
NEUTROPHILS # BLD AUTO: 9.29 THOUSANDS/ΜL (ref 1.85–7.62)
NEUTS SEG NFR BLD AUTO: 84 % (ref 43–75)
NRBC BLD AUTO-RTO: 0 /100 WBCS
PHOSPHATE SERPL-MCNC: 3.8 MG/DL (ref 2.7–4.5)
PLATELET # BLD AUTO: 288 THOUSANDS/UL (ref 149–390)
PMV BLD AUTO: 10.1 FL (ref 8.9–12.7)
POTASSIUM SERPL-SCNC: 3.9 MMOL/L (ref 3.5–5.3)
PROCALCITONIN SERPL-MCNC: 0.22 NG/ML
RBC # BLD AUTO: 6.23 MILLION/UL (ref 3.88–5.62)
S PNEUM AG UR QL: NEGATIVE
SODIUM SERPL-SCNC: 134 MMOL/L (ref 136–145)
WBC # BLD AUTO: 11.14 THOUSAND/UL (ref 4.31–10.16)

## 2021-12-18 PROCEDURE — 83735 ASSAY OF MAGNESIUM: CPT | Performed by: NURSE PRACTITIONER

## 2021-12-18 PROCEDURE — 85379 FIBRIN DEGRADATION QUANT: CPT | Performed by: NURSE PRACTITIONER

## 2021-12-18 PROCEDURE — 94640 AIRWAY INHALATION TREATMENT: CPT

## 2021-12-18 PROCEDURE — 94002 VENT MGMT INPAT INIT DAY: CPT

## 2021-12-18 PROCEDURE — 85025 COMPLETE CBC W/AUTO DIFF WBC: CPT | Performed by: NURSE PRACTITIONER

## 2021-12-18 PROCEDURE — 82948 REAGENT STRIP/BLOOD GLUCOSE: CPT

## 2021-12-18 PROCEDURE — 99232 SBSQ HOSP IP/OBS MODERATE 35: CPT | Performed by: INTERNAL MEDICINE

## 2021-12-18 PROCEDURE — 84100 ASSAY OF PHOSPHORUS: CPT | Performed by: NURSE PRACTITIONER

## 2021-12-18 PROCEDURE — 94760 N-INVAS EAR/PLS OXIMETRY 1: CPT

## 2021-12-18 PROCEDURE — 84145 PROCALCITONIN (PCT): CPT | Performed by: NURSE PRACTITIONER

## 2021-12-18 PROCEDURE — 80048 BASIC METABOLIC PNL TOTAL CA: CPT | Performed by: NURSE PRACTITIONER

## 2021-12-18 PROCEDURE — 86140 C-REACTIVE PROTEIN: CPT | Performed by: NURSE PRACTITIONER

## 2021-12-18 RX ORDER — LIDOCAINE 50 MG/G
1 PATCH TOPICAL DAILY
Status: DISCONTINUED | OUTPATIENT
Start: 2021-12-18 | End: 2021-12-27 | Stop reason: HOSPADM

## 2021-12-18 RX ORDER — ACETAMINOPHEN 325 MG/1
650 TABLET ORAL EVERY 6 HOURS PRN
Status: DISCONTINUED | OUTPATIENT
Start: 2021-12-18 | End: 2021-12-27 | Stop reason: HOSPADM

## 2021-12-18 RX ORDER — POTASSIUM CHLORIDE 750 MG/1
10 TABLET, EXTENDED RELEASE ORAL ONCE
Status: COMPLETED | OUTPATIENT
Start: 2021-12-18 | End: 2021-12-18

## 2021-12-18 RX ADMIN — INSULIN LISPRO 1 UNITS: 100 INJECTION, SOLUTION INTRAVENOUS; SUBCUTANEOUS at 12:16

## 2021-12-18 RX ADMIN — LEVALBUTEROL HYDROCHLORIDE 1.25 MG: 1.25 SOLUTION, CONCENTRATE RESPIRATORY (INHALATION) at 14:19

## 2021-12-18 RX ADMIN — DORNASE ALFA 2.5 MG: 1 SOLUTION RESPIRATORY (INHALATION) at 08:34

## 2021-12-18 RX ADMIN — DEXAMETHASONE SODIUM PHOSPHATE 12.2 MG: 10 INJECTION, SOLUTION INTRAMUSCULAR; INTRAVENOUS at 21:43

## 2021-12-18 RX ADMIN — ENOXAPARIN SODIUM 30 MG: 30 INJECTION SUBCUTANEOUS at 08:03

## 2021-12-18 RX ADMIN — POTASSIUM CHLORIDE 10 MEQ: 750 TABLET, EXTENDED RELEASE ORAL at 07:59

## 2021-12-18 RX ADMIN — DEXAMETHASONE SODIUM PHOSPHATE 12.2 MG: 10 INJECTION, SOLUTION INTRAMUSCULAR; INTRAVENOUS at 08:00

## 2021-12-18 RX ADMIN — BARICITINIB 4 MG: 2 TABLET, FILM COATED ORAL at 18:15

## 2021-12-18 RX ADMIN — DORNASE ALFA 2.5 MG: 1 SOLUTION RESPIRATORY (INHALATION) at 19:39

## 2021-12-18 RX ADMIN — SODIUM CHLORIDE SOLN NEBU 3% 4 ML: 3 NEBU SOLN at 14:19

## 2021-12-18 RX ADMIN — LEVALBUTEROL HYDROCHLORIDE 1.25 MG: 1.25 SOLUTION, CONCENTRATE RESPIRATORY (INHALATION) at 19:39

## 2021-12-18 RX ADMIN — SODIUM CHLORIDE SOLN NEBU 3% 4 ML: 3 NEBU SOLN at 19:39

## 2021-12-18 RX ADMIN — LEVALBUTEROL HYDROCHLORIDE 1.25 MG: 1.25 SOLUTION, CONCENTRATE RESPIRATORY (INHALATION) at 08:33

## 2021-12-18 RX ADMIN — BENZONATATE 100 MG: 100 CAPSULE ORAL at 07:59

## 2021-12-18 RX ADMIN — Medication 1 SPRAY: at 18:07

## 2021-12-18 RX ADMIN — INSULIN LISPRO 1 UNITS: 100 INJECTION, SOLUTION INTRAVENOUS; SUBCUTANEOUS at 21:44

## 2021-12-18 RX ADMIN — DOXYCYCLINE 100 MG: 100 CAPSULE ORAL at 07:59

## 2021-12-18 RX ADMIN — ATORVASTATIN CALCIUM 40 MG: 40 TABLET, FILM COATED ORAL at 21:43

## 2021-12-18 RX ADMIN — AMLODIPINE BESYLATE 5 MG: 5 TABLET ORAL at 09:07

## 2021-12-18 RX ADMIN — Medication 1 SPRAY: at 08:20

## 2021-12-18 RX ADMIN — ENOXAPARIN SODIUM 30 MG: 30 INJECTION SUBCUTANEOUS at 21:43

## 2021-12-18 RX ADMIN — CEFTRIAXONE 1000 MG: 1 INJECTION, SOLUTION INTRAVENOUS at 18:16

## 2021-12-18 RX ADMIN — LIDOCAINE 5% 1 PATCH: 700 PATCH TOPICAL at 09:07

## 2021-12-18 RX ADMIN — DOXYCYCLINE 100 MG: 100 CAPSULE ORAL at 18:15

## 2021-12-18 RX ADMIN — SODIUM CHLORIDE SOLN NEBU 3% 4 ML: 3 NEBU SOLN at 08:34

## 2021-12-18 RX ADMIN — ACETAMINOPHEN 650 MG: 325 TABLET, FILM COATED ORAL at 09:08

## 2021-12-18 RX ADMIN — INSULIN LISPRO 2 UNITS: 100 INJECTION, SOLUTION INTRAVENOUS; SUBCUTANEOUS at 17:18

## 2021-12-19 LAB
ANION GAP SERPL CALCULATED.3IONS-SCNC: 10 MMOL/L (ref 4–13)
BACTERIA UR QL AUTO: ABNORMAL /HPF
BASOPHILS # BLD AUTO: 0.02 THOUSANDS/ΜL (ref 0–0.1)
BASOPHILS NFR BLD AUTO: 0 % (ref 0–1)
BILIRUB UR QL STRIP: NEGATIVE
BUN SERPL-MCNC: 26 MG/DL (ref 5–25)
CALCIUM SERPL-MCNC: 8.3 MG/DL (ref 8.3–10.1)
CHLORIDE SERPL-SCNC: 101 MMOL/L (ref 100–108)
CLARITY UR: CLEAR
CO2 SERPL-SCNC: 25 MMOL/L (ref 21–32)
COLOR UR: YELLOW
CREAT SERPL-MCNC: 1.33 MG/DL (ref 0.6–1.3)
CREAT UR-MCNC: 116 MG/DL
EOSINOPHIL # BLD AUTO: 0.01 THOUSAND/ΜL (ref 0–0.61)
EOSINOPHIL NFR BLD AUTO: 0 % (ref 0–6)
ERYTHROCYTE [DISTWIDTH] IN BLOOD BY AUTOMATED COUNT: 13.2 % (ref 11.6–15.1)
FERRITIN SERPL-MCNC: 891 NG/ML (ref 8–388)
GFR SERPL CREATININE-BSD FRML MDRD: 62 ML/MIN/1.73SQ M
GLUCOSE SERPL-MCNC: 143 MG/DL (ref 65–140)
GLUCOSE SERPL-MCNC: 160 MG/DL (ref 65–140)
GLUCOSE SERPL-MCNC: 181 MG/DL (ref 65–140)
GLUCOSE SERPL-MCNC: 192 MG/DL (ref 65–140)
GLUCOSE SERPL-MCNC: 347 MG/DL (ref 65–140)
GLUCOSE UR STRIP-MCNC: NEGATIVE MG/DL
HCT VFR BLD AUTO: 48.3 % (ref 36.5–49.3)
HGB BLD-MCNC: 15.9 G/DL (ref 12–17)
HGB UR QL STRIP.AUTO: ABNORMAL
HYALINE CASTS #/AREA URNS LPF: ABNORMAL /LPF
IMM GRANULOCYTES # BLD AUTO: 0.09 THOUSAND/UL (ref 0–0.2)
IMM GRANULOCYTES NFR BLD AUTO: 1 % (ref 0–2)
IRON SATN MFR SERPL: 25 % (ref 20–50)
IRON SERPL-MCNC: 68 UG/DL (ref 65–175)
KETONES UR STRIP-MCNC: NEGATIVE MG/DL
LEUKOCYTE ESTERASE UR QL STRIP: NEGATIVE
LYMPHOCYTES # BLD AUTO: 1.15 THOUSANDS/ΜL (ref 0.6–4.47)
LYMPHOCYTES NFR BLD AUTO: 9 % (ref 14–44)
MAGNESIUM SERPL-MCNC: 3.2 MG/DL (ref 1.6–2.6)
MCH RBC QN AUTO: 26.2 PG (ref 26.8–34.3)
MCHC RBC AUTO-ENTMCNC: 32.9 G/DL (ref 31.4–37.4)
MCV RBC AUTO: 79 FL (ref 82–98)
MICROALBUMIN UR-MCNC: 109 MG/L (ref 0–20)
MICROALBUMIN/CREAT 24H UR: 94 MG/G CREATININE (ref 0–30)
MONOCYTES # BLD AUTO: 0.68 THOUSAND/ΜL (ref 0.17–1.22)
MONOCYTES NFR BLD AUTO: 6 % (ref 4–12)
MUCOUS THREADS UR QL AUTO: ABNORMAL
NEUTROPHILS # BLD AUTO: 10.52 THOUSANDS/ΜL (ref 1.85–7.62)
NEUTS SEG NFR BLD AUTO: 84 % (ref 43–75)
NITRITE UR QL STRIP: NEGATIVE
NON-SQ EPI CELLS URNS QL MICRO: ABNORMAL /HPF
NRBC BLD AUTO-RTO: 0 /100 WBCS
PH UR STRIP.AUTO: 6 [PH]
PLATELET # BLD AUTO: 335 THOUSANDS/UL (ref 149–390)
PMV BLD AUTO: 9.8 FL (ref 8.9–12.7)
POTASSIUM SERPL-SCNC: 4.4 MMOL/L (ref 3.5–5.3)
PROT UR STRIP-MCNC: ABNORMAL MG/DL
RBC # BLD AUTO: 6.08 MILLION/UL (ref 3.88–5.62)
RBC #/AREA URNS AUTO: ABNORMAL /HPF
SODIUM SERPL-SCNC: 136 MMOL/L (ref 136–145)
SP GR UR STRIP.AUTO: 1.02 (ref 1–1.03)
TIBC SERPL-MCNC: 270 UG/DL (ref 250–450)
UROBILINOGEN UR QL STRIP.AUTO: 0.2 E.U./DL
WBC # BLD AUTO: 12.47 THOUSAND/UL (ref 4.31–10.16)
WBC #/AREA URNS AUTO: ABNORMAL /HPF

## 2021-12-19 PROCEDURE — 83540 ASSAY OF IRON: CPT | Performed by: NURSE PRACTITIONER

## 2021-12-19 PROCEDURE — 82570 ASSAY OF URINE CREATININE: CPT | Performed by: NURSE PRACTITIONER

## 2021-12-19 PROCEDURE — 94760 N-INVAS EAR/PLS OXIMETRY 1: CPT

## 2021-12-19 PROCEDURE — 94640 AIRWAY INHALATION TREATMENT: CPT

## 2021-12-19 PROCEDURE — 94003 VENT MGMT INPAT SUBQ DAY: CPT

## 2021-12-19 PROCEDURE — 82043 UR ALBUMIN QUANTITATIVE: CPT | Performed by: NURSE PRACTITIONER

## 2021-12-19 PROCEDURE — 82948 REAGENT STRIP/BLOOD GLUCOSE: CPT

## 2021-12-19 PROCEDURE — 83550 IRON BINDING TEST: CPT | Performed by: NURSE PRACTITIONER

## 2021-12-19 PROCEDURE — 85025 COMPLETE CBC W/AUTO DIFF WBC: CPT | Performed by: NURSE PRACTITIONER

## 2021-12-19 PROCEDURE — 99232 SBSQ HOSP IP/OBS MODERATE 35: CPT | Performed by: INTERNAL MEDICINE

## 2021-12-19 PROCEDURE — 82728 ASSAY OF FERRITIN: CPT | Performed by: NURSE PRACTITIONER

## 2021-12-19 PROCEDURE — 81001 URINALYSIS AUTO W/SCOPE: CPT | Performed by: NURSE PRACTITIONER

## 2021-12-19 PROCEDURE — 83735 ASSAY OF MAGNESIUM: CPT | Performed by: NURSE PRACTITIONER

## 2021-12-19 PROCEDURE — 80048 BASIC METABOLIC PNL TOTAL CA: CPT | Performed by: NURSE PRACTITIONER

## 2021-12-19 RX ADMIN — Medication 1 SPRAY: at 18:26

## 2021-12-19 RX ADMIN — ATORVASTATIN CALCIUM 40 MG: 40 TABLET, FILM COATED ORAL at 21:06

## 2021-12-19 RX ADMIN — INSULIN LISPRO 2 UNITS: 100 INJECTION, SOLUTION INTRAVENOUS; SUBCUTANEOUS at 21:01

## 2021-12-19 RX ADMIN — INSULIN LISPRO 5 UNITS: 100 INJECTION, SOLUTION INTRAVENOUS; SUBCUTANEOUS at 11:38

## 2021-12-19 RX ADMIN — BARICITINIB 4 MG: 2 TABLET, FILM COATED ORAL at 18:22

## 2021-12-19 RX ADMIN — DORNASE ALFA 2.5 MG: 1 SOLUTION RESPIRATORY (INHALATION) at 19:24

## 2021-12-19 RX ADMIN — LIDOCAINE 5% 1 PATCH: 700 PATCH TOPICAL at 08:04

## 2021-12-19 RX ADMIN — LEVALBUTEROL HYDROCHLORIDE 1.25 MG: 1.25 SOLUTION, CONCENTRATE RESPIRATORY (INHALATION) at 19:24

## 2021-12-19 RX ADMIN — DEXAMETHASONE SODIUM PHOSPHATE 12.2 MG: 10 INJECTION, SOLUTION INTRAMUSCULAR; INTRAVENOUS at 19:51

## 2021-12-19 RX ADMIN — ENOXAPARIN SODIUM 30 MG: 30 INJECTION SUBCUTANEOUS at 08:05

## 2021-12-19 RX ADMIN — LEVALBUTEROL HYDROCHLORIDE 1.25 MG: 1.25 SOLUTION, CONCENTRATE RESPIRATORY (INHALATION) at 07:55

## 2021-12-19 RX ADMIN — INSULIN LISPRO 1 UNITS: 100 INJECTION, SOLUTION INTRAVENOUS; SUBCUTANEOUS at 17:40

## 2021-12-19 RX ADMIN — ENOXAPARIN SODIUM 30 MG: 30 INJECTION SUBCUTANEOUS at 21:04

## 2021-12-19 RX ADMIN — SODIUM CHLORIDE SOLN NEBU 3% 4 ML: 3 NEBU SOLN at 07:55

## 2021-12-19 RX ADMIN — BENZONATATE 100 MG: 100 CAPSULE ORAL at 08:05

## 2021-12-19 RX ADMIN — DORNASE ALFA 2.5 MG: 1 SOLUTION RESPIRATORY (INHALATION) at 07:55

## 2021-12-19 RX ADMIN — AMLODIPINE BESYLATE 5 MG: 5 TABLET ORAL at 08:05

## 2021-12-19 RX ADMIN — SODIUM CHLORIDE SOLN NEBU 3% 4 ML: 3 NEBU SOLN at 19:24

## 2021-12-19 RX ADMIN — DEXAMETHASONE SODIUM PHOSPHATE 12.2 MG: 10 INJECTION, SOLUTION INTRAMUSCULAR; INTRAVENOUS at 08:05

## 2021-12-19 RX ADMIN — LEVALBUTEROL HYDROCHLORIDE 1.25 MG: 1.25 SOLUTION, CONCENTRATE RESPIRATORY (INHALATION) at 14:30

## 2021-12-19 RX ADMIN — CEFTRIAXONE 1000 MG: 1 INJECTION, SOLUTION INTRAVENOUS at 18:22

## 2021-12-19 RX ADMIN — SODIUM CHLORIDE SOLN NEBU 3% 4 ML: 3 NEBU SOLN at 14:30

## 2021-12-19 RX ADMIN — DOXYCYCLINE 100 MG: 100 CAPSULE ORAL at 18:22

## 2021-12-19 RX ADMIN — DOXYCYCLINE 100 MG: 100 CAPSULE ORAL at 08:05

## 2021-12-20 LAB
ALBUMIN SERPL BCP-MCNC: 2.7 G/DL (ref 3.5–5)
ALP SERPL-CCNC: 89 U/L (ref 46–116)
ALT SERPL W P-5'-P-CCNC: 55 U/L (ref 12–78)
ANION GAP SERPL CALCULATED.3IONS-SCNC: 8 MMOL/L (ref 4–13)
AST SERPL W P-5'-P-CCNC: 31 U/L (ref 5–45)
BASOPHILS # BLD AUTO: 0.01 THOUSANDS/ΜL (ref 0–0.1)
BASOPHILS NFR BLD AUTO: 0 % (ref 0–1)
BILIRUB SERPL-MCNC: 0.4 MG/DL (ref 0.2–1)
BUN SERPL-MCNC: 26 MG/DL (ref 5–25)
CALCIUM ALBUM COR SERPL-MCNC: 9.5 MG/DL (ref 8.3–10.1)
CALCIUM SERPL-MCNC: 8.5 MG/DL (ref 8.3–10.1)
CHLORIDE SERPL-SCNC: 101 MMOL/L (ref 100–108)
CO2 SERPL-SCNC: 27 MMOL/L (ref 21–32)
CREAT SERPL-MCNC: 1.33 MG/DL (ref 0.6–1.3)
EOSINOPHIL # BLD AUTO: 0 THOUSAND/ΜL (ref 0–0.61)
EOSINOPHIL NFR BLD AUTO: 0 % (ref 0–6)
ERYTHROCYTE [DISTWIDTH] IN BLOOD BY AUTOMATED COUNT: 13.2 % (ref 11.6–15.1)
GFR SERPL CREATININE-BSD FRML MDRD: 62 ML/MIN/1.73SQ M
GLUCOSE SERPL-MCNC: 114 MG/DL (ref 65–140)
GLUCOSE SERPL-MCNC: 150 MG/DL (ref 65–140)
GLUCOSE SERPL-MCNC: 156 MG/DL (ref 65–140)
GLUCOSE SERPL-MCNC: 219 MG/DL (ref 65–140)
GLUCOSE SERPL-MCNC: 293 MG/DL (ref 65–140)
HCT VFR BLD AUTO: 48.4 % (ref 36.5–49.3)
HGB BLD-MCNC: 15.7 G/DL (ref 12–17)
IMM GRANULOCYTES # BLD AUTO: 0.12 THOUSAND/UL (ref 0–0.2)
IMM GRANULOCYTES NFR BLD AUTO: 1 % (ref 0–2)
LYMPHOCYTES # BLD AUTO: 1.04 THOUSANDS/ΜL (ref 0.6–4.47)
LYMPHOCYTES NFR BLD AUTO: 10 % (ref 14–44)
MAGNESIUM SERPL-MCNC: 3.1 MG/DL (ref 1.6–2.6)
MCH RBC QN AUTO: 25.7 PG (ref 26.8–34.3)
MCHC RBC AUTO-ENTMCNC: 32.4 G/DL (ref 31.4–37.4)
MCV RBC AUTO: 79 FL (ref 82–98)
MONOCYTES # BLD AUTO: 0.8 THOUSAND/ΜL (ref 0.17–1.22)
MONOCYTES NFR BLD AUTO: 8 % (ref 4–12)
NEUTROPHILS # BLD AUTO: 8.59 THOUSANDS/ΜL (ref 1.85–7.62)
NEUTS SEG NFR BLD AUTO: 81 % (ref 43–75)
NRBC BLD AUTO-RTO: 0 /100 WBCS
PLATELET # BLD AUTO: 350 THOUSANDS/UL (ref 149–390)
PMV BLD AUTO: 9.9 FL (ref 8.9–12.7)
POTASSIUM SERPL-SCNC: 4.9 MMOL/L (ref 3.5–5.3)
PROT SERPL-MCNC: 8.1 G/DL (ref 6.4–8.2)
RBC # BLD AUTO: 6.1 MILLION/UL (ref 3.88–5.62)
SODIUM SERPL-SCNC: 136 MMOL/L (ref 136–145)
WBC # BLD AUTO: 10.56 THOUSAND/UL (ref 4.31–10.16)

## 2021-12-20 PROCEDURE — 94760 N-INVAS EAR/PLS OXIMETRY 1: CPT

## 2021-12-20 PROCEDURE — 94003 VENT MGMT INPAT SUBQ DAY: CPT

## 2021-12-20 PROCEDURE — 85025 COMPLETE CBC W/AUTO DIFF WBC: CPT | Performed by: NURSE PRACTITIONER

## 2021-12-20 PROCEDURE — 82948 REAGENT STRIP/BLOOD GLUCOSE: CPT

## 2021-12-20 PROCEDURE — 99291 CRITICAL CARE FIRST HOUR: CPT | Performed by: NURSE PRACTITIONER

## 2021-12-20 PROCEDURE — 94640 AIRWAY INHALATION TREATMENT: CPT

## 2021-12-20 PROCEDURE — 80053 COMPREHEN METABOLIC PANEL: CPT | Performed by: NURSE PRACTITIONER

## 2021-12-20 PROCEDURE — 83735 ASSAY OF MAGNESIUM: CPT | Performed by: NURSE PRACTITIONER

## 2021-12-20 RX ORDER — POLYETHYLENE GLYCOL 3350 17 G/17G
17 POWDER, FOR SOLUTION ORAL DAILY
Status: DISCONTINUED | OUTPATIENT
Start: 2021-12-20 | End: 2021-12-27 | Stop reason: HOSPADM

## 2021-12-20 RX ADMIN — INSULIN LISPRO 2 UNITS: 100 INJECTION, SOLUTION INTRAVENOUS; SUBCUTANEOUS at 16:56

## 2021-12-20 RX ADMIN — INSULIN LISPRO 4 UNITS: 100 INJECTION, SOLUTION INTRAVENOUS; SUBCUTANEOUS at 12:40

## 2021-12-20 RX ADMIN — DOXYCYCLINE 100 MG: 100 CAPSULE ORAL at 08:08

## 2021-12-20 RX ADMIN — LEVALBUTEROL HYDROCHLORIDE 1.25 MG: 1.25 SOLUTION, CONCENTRATE RESPIRATORY (INHALATION) at 20:34

## 2021-12-20 RX ADMIN — SODIUM CHLORIDE SOLN NEBU 3% 4 ML: 3 NEBU SOLN at 13:25

## 2021-12-20 RX ADMIN — ATORVASTATIN CALCIUM 40 MG: 40 TABLET, FILM COATED ORAL at 21:01

## 2021-12-20 RX ADMIN — DORNASE ALFA 2.5 MG: 1 SOLUTION RESPIRATORY (INHALATION) at 20:34

## 2021-12-20 RX ADMIN — SODIUM CHLORIDE SOLN NEBU 3% 4 ML: 3 NEBU SOLN at 20:34

## 2021-12-20 RX ADMIN — DEXAMETHASONE SODIUM PHOSPHATE 12.2 MG: 10 INJECTION, SOLUTION INTRAMUSCULAR; INTRAVENOUS at 20:52

## 2021-12-20 RX ADMIN — CEFTRIAXONE 1000 MG: 1 INJECTION, SOLUTION INTRAVENOUS at 19:58

## 2021-12-20 RX ADMIN — BARICITINIB 4 MG: 2 TABLET, FILM COATED ORAL at 19:57

## 2021-12-20 RX ADMIN — LEVALBUTEROL HYDROCHLORIDE 1.25 MG: 1.25 SOLUTION, CONCENTRATE RESPIRATORY (INHALATION) at 13:24

## 2021-12-20 RX ADMIN — INSULIN LISPRO 1 UNITS: 100 INJECTION, SOLUTION INTRAVENOUS; SUBCUTANEOUS at 21:01

## 2021-12-20 RX ADMIN — ENOXAPARIN SODIUM 30 MG: 30 INJECTION SUBCUTANEOUS at 20:05

## 2021-12-20 RX ADMIN — LEVALBUTEROL HYDROCHLORIDE 1.25 MG: 1.25 SOLUTION, CONCENTRATE RESPIRATORY (INHALATION) at 07:22

## 2021-12-20 RX ADMIN — AMLODIPINE BESYLATE 5 MG: 5 TABLET ORAL at 08:08

## 2021-12-20 RX ADMIN — DORNASE ALFA 2.5 MG: 1 SOLUTION RESPIRATORY (INHALATION) at 07:37

## 2021-12-20 RX ADMIN — ENOXAPARIN SODIUM 30 MG: 30 INJECTION SUBCUTANEOUS at 08:09

## 2021-12-20 RX ADMIN — SODIUM CHLORIDE SOLN NEBU 3% 4 ML: 3 NEBU SOLN at 07:22

## 2021-12-20 RX ADMIN — DOXYCYCLINE 100 MG: 100 CAPSULE ORAL at 19:57

## 2021-12-20 RX ADMIN — DEXAMETHASONE SODIUM PHOSPHATE 12.2 MG: 10 INJECTION, SOLUTION INTRAMUSCULAR; INTRAVENOUS at 08:09

## 2021-12-21 ENCOUNTER — APPOINTMENT (INPATIENT)
Dept: RADIOLOGY | Facility: HOSPITAL | Age: 51
DRG: 177 | End: 2021-12-21
Payer: COMMERCIAL

## 2021-12-21 LAB
ANION GAP SERPL CALCULATED.3IONS-SCNC: 7 MMOL/L (ref 4–13)
ATRIAL RATE: 107 BPM
BACTERIA BLD CULT: NORMAL
BACTERIA BLD CULT: NORMAL
BASE EXCESS BLDA CALC-SCNC: 0 MMOL/L (ref -2–3)
BUN SERPL-MCNC: 26 MG/DL (ref 5–25)
CALCIUM SERPL-MCNC: 8.6 MG/DL (ref 8.3–10.1)
CHLORIDE SERPL-SCNC: 101 MMOL/L (ref 100–108)
CO2 SERPL-SCNC: 27 MMOL/L (ref 21–32)
CREAT SERPL-MCNC: 1.3 MG/DL (ref 0.6–1.3)
ERYTHROCYTE [DISTWIDTH] IN BLOOD BY AUTOMATED COUNT: 13.2 % (ref 11.6–15.1)
GFR SERPL CREATININE-BSD FRML MDRD: 64 ML/MIN/1.73SQ M
GLUCOSE SERPL-MCNC: 158 MG/DL (ref 65–140)
GLUCOSE SERPL-MCNC: 182 MG/DL (ref 65–140)
GLUCOSE SERPL-MCNC: 192 MG/DL (ref 65–140)
GLUCOSE SERPL-MCNC: 219 MG/DL (ref 65–140)
GLUCOSE SERPL-MCNC: 236 MG/DL (ref 65–140)
GLUCOSE SERPL-MCNC: 271 MG/DL (ref 65–140)
HCO3 BLDA-SCNC: 23.6 MMOL/L (ref 22–28)
HCT VFR BLD AUTO: 50.5 % (ref 36.5–49.3)
HCT VFR BLD CALC: 49 % (ref 36.5–49.3)
HGB BLD-MCNC: 16.4 G/DL (ref 12–17)
HGB BLDA-MCNC: 16.7 G/DL (ref 12–17)
MCH RBC QN AUTO: 25.9 PG (ref 26.8–34.3)
MCHC RBC AUTO-ENTMCNC: 32.5 G/DL (ref 31.4–37.4)
MCV RBC AUTO: 80 FL (ref 82–98)
P AXIS: 43 DEGREES
PCO2 BLD: 25 MMOL/L (ref 21–32)
PCO2 BLD: 35.1 MM HG (ref 36–44)
PH BLD: 7.44 [PH] (ref 7.35–7.45)
PLATELET # BLD AUTO: 417 THOUSANDS/UL (ref 149–390)
PMV BLD AUTO: 10.1 FL (ref 8.9–12.7)
PO2 BLD: 60 MM HG (ref 75–129)
POTASSIUM BLD-SCNC: 5 MMOL/L (ref 3.5–5.3)
POTASSIUM SERPL-SCNC: 5.4 MMOL/L (ref 3.5–5.3)
PR INTERVAL: 132 MS
QRS AXIS: 22 DEGREES
QRSD INTERVAL: 100 MS
QT INTERVAL: 336 MS
QTC INTERVAL: 448 MS
RBC # BLD AUTO: 6.34 MILLION/UL (ref 3.88–5.62)
SAO2 % BLD FROM PO2: 92 % (ref 60–85)
SODIUM BLD-SCNC: 137 MMOL/L (ref 136–145)
SODIUM SERPL-SCNC: 135 MMOL/L (ref 136–145)
SPECIMEN SOURCE: ABNORMAL
T WAVE AXIS: 12 DEGREES
VENTRICULAR RATE: 107 BPM
WBC # BLD AUTO: 9.2 THOUSAND/UL (ref 4.31–10.16)

## 2021-12-21 PROCEDURE — 84132 ASSAY OF SERUM POTASSIUM: CPT

## 2021-12-21 PROCEDURE — 82948 REAGENT STRIP/BLOOD GLUCOSE: CPT

## 2021-12-21 PROCEDURE — 71045 X-RAY EXAM CHEST 1 VIEW: CPT

## 2021-12-21 PROCEDURE — 85027 COMPLETE CBC AUTOMATED: CPT | Performed by: NURSE PRACTITIONER

## 2021-12-21 PROCEDURE — 82803 BLOOD GASES ANY COMBINATION: CPT

## 2021-12-21 PROCEDURE — NC001 PR NO CHARGE: Performed by: NURSE PRACTITIONER

## 2021-12-21 PROCEDURE — 99232 SBSQ HOSP IP/OBS MODERATE 35: CPT | Performed by: INTERNAL MEDICINE

## 2021-12-21 PROCEDURE — 93005 ELECTROCARDIOGRAM TRACING: CPT

## 2021-12-21 PROCEDURE — 85014 HEMATOCRIT: CPT

## 2021-12-21 PROCEDURE — 93010 ELECTROCARDIOGRAM REPORT: CPT | Performed by: INTERNAL MEDICINE

## 2021-12-21 PROCEDURE — 94760 N-INVAS EAR/PLS OXIMETRY 1: CPT

## 2021-12-21 PROCEDURE — 94640 AIRWAY INHALATION TREATMENT: CPT

## 2021-12-21 PROCEDURE — 36600 WITHDRAWAL OF ARTERIAL BLOOD: CPT

## 2021-12-21 PROCEDURE — 84295 ASSAY OF SERUM SODIUM: CPT

## 2021-12-21 PROCEDURE — 80048 BASIC METABOLIC PNL TOTAL CA: CPT | Performed by: NURSE PRACTITIONER

## 2021-12-21 PROCEDURE — 82947 ASSAY GLUCOSE BLOOD QUANT: CPT

## 2021-12-21 RX ADMIN — BARICITINIB 4 MG: 2 TABLET, FILM COATED ORAL at 20:47

## 2021-12-21 RX ADMIN — DORNASE ALFA 2.5 MG: 1 SOLUTION RESPIRATORY (INHALATION) at 20:31

## 2021-12-21 RX ADMIN — DOXYCYCLINE 100 MG: 100 CAPSULE ORAL at 09:37

## 2021-12-21 RX ADMIN — ENOXAPARIN SODIUM 30 MG: 30 INJECTION SUBCUTANEOUS at 20:48

## 2021-12-21 RX ADMIN — ENOXAPARIN SODIUM 30 MG: 30 INJECTION SUBCUTANEOUS at 09:37

## 2021-12-21 RX ADMIN — LEVALBUTEROL HYDROCHLORIDE 1.25 MG: 1.25 SOLUTION, CONCENTRATE RESPIRATORY (INHALATION) at 07:29

## 2021-12-21 RX ADMIN — DEXAMETHASONE SODIUM PHOSPHATE 12.2 MG: 10 INJECTION, SOLUTION INTRAMUSCULAR; INTRAVENOUS at 20:51

## 2021-12-21 RX ADMIN — SODIUM CHLORIDE SOLN NEBU 3% 4 ML: 3 NEBU SOLN at 07:29

## 2021-12-21 RX ADMIN — DORNASE ALFA 2.5 MG: 1 SOLUTION RESPIRATORY (INHALATION) at 07:29

## 2021-12-21 RX ADMIN — DOCUSATE SODIUM AND SENNOSIDES 2 TABLET: 8.6; 5 TABLET ORAL at 09:36

## 2021-12-21 RX ADMIN — SODIUM CHLORIDE SOLN NEBU 3% 4 ML: 3 NEBU SOLN at 13:53

## 2021-12-21 RX ADMIN — AMLODIPINE BESYLATE 5 MG: 5 TABLET ORAL at 09:38

## 2021-12-21 RX ADMIN — ATORVASTATIN CALCIUM 40 MG: 40 TABLET, FILM COATED ORAL at 20:50

## 2021-12-21 RX ADMIN — LEVALBUTEROL HYDROCHLORIDE 1.25 MG: 1.25 SOLUTION, CONCENTRATE RESPIRATORY (INHALATION) at 20:32

## 2021-12-21 RX ADMIN — INSULIN LISPRO 1 UNITS: 100 INJECTION, SOLUTION INTRAVENOUS; SUBCUTANEOUS at 09:40

## 2021-12-21 RX ADMIN — INSULIN LISPRO 2 UNITS: 100 INJECTION, SOLUTION INTRAVENOUS; SUBCUTANEOUS at 11:39

## 2021-12-21 RX ADMIN — INSULIN LISPRO 4 UNITS: 100 INJECTION, SOLUTION INTRAVENOUS; SUBCUTANEOUS at 16:25

## 2021-12-21 RX ADMIN — POLYETHYLENE GLYCOL 3350 17 G: 17 POWDER, FOR SOLUTION ORAL at 09:36

## 2021-12-21 RX ADMIN — INSULIN LISPRO 3 UNITS: 100 INJECTION, SOLUTION INTRAVENOUS; SUBCUTANEOUS at 21:03

## 2021-12-21 RX ADMIN — SODIUM CHLORIDE SOLN NEBU 3% 4 ML: 3 NEBU SOLN at 20:32

## 2021-12-21 RX ADMIN — DEXAMETHASONE SODIUM PHOSPHATE 12.2 MG: 10 INJECTION, SOLUTION INTRAMUSCULAR; INTRAVENOUS at 09:38

## 2021-12-21 RX ADMIN — LEVALBUTEROL HYDROCHLORIDE 1.25 MG: 1.25 SOLUTION, CONCENTRATE RESPIRATORY (INHALATION) at 13:53

## 2021-12-22 LAB
ANION GAP SERPL CALCULATED.3IONS-SCNC: 8 MMOL/L (ref 4–13)
BACTERIA BLD CULT: NORMAL
BACTERIA BLD CULT: NORMAL
BASOPHILS # BLD AUTO: 0.01 THOUSANDS/ΜL (ref 0–0.1)
BASOPHILS NFR BLD AUTO: 0 % (ref 0–1)
BUN SERPL-MCNC: 26 MG/DL (ref 5–25)
CALCIUM SERPL-MCNC: 8.4 MG/DL (ref 8.3–10.1)
CHLORIDE SERPL-SCNC: 101 MMOL/L (ref 100–108)
CO2 SERPL-SCNC: 28 MMOL/L (ref 21–32)
CREAT SERPL-MCNC: 1.41 MG/DL (ref 0.6–1.3)
EOSINOPHIL # BLD AUTO: 0.01 THOUSAND/ΜL (ref 0–0.61)
EOSINOPHIL NFR BLD AUTO: 0 % (ref 0–6)
ERYTHROCYTE [DISTWIDTH] IN BLOOD BY AUTOMATED COUNT: 13.3 % (ref 11.6–15.1)
GFR SERPL CREATININE-BSD FRML MDRD: 58 ML/MIN/1.73SQ M
GLUCOSE SERPL-MCNC: 125 MG/DL (ref 65–140)
GLUCOSE SERPL-MCNC: 160 MG/DL (ref 65–140)
GLUCOSE SERPL-MCNC: 283 MG/DL (ref 65–140)
GLUCOSE SERPL-MCNC: 283 MG/DL (ref 65–140)
HCT VFR BLD AUTO: 51.9 % (ref 36.5–49.3)
HGB BLD-MCNC: 16.5 G/DL (ref 12–17)
IMM GRANULOCYTES # BLD AUTO: 0.1 THOUSAND/UL (ref 0–0.2)
IMM GRANULOCYTES NFR BLD AUTO: 1 % (ref 0–2)
LYMPHOCYTES # BLD AUTO: 1.02 THOUSANDS/ΜL (ref 0.6–4.47)
LYMPHOCYTES NFR BLD AUTO: 10 % (ref 14–44)
MCH RBC QN AUTO: 25.6 PG (ref 26.8–34.3)
MCHC RBC AUTO-ENTMCNC: 31.8 G/DL (ref 31.4–37.4)
MCV RBC AUTO: 81 FL (ref 82–98)
MONOCYTES # BLD AUTO: 0.73 THOUSAND/ΜL (ref 0.17–1.22)
MONOCYTES NFR BLD AUTO: 7 % (ref 4–12)
NEUTROPHILS # BLD AUTO: 8.7 THOUSANDS/ΜL (ref 1.85–7.62)
NEUTS SEG NFR BLD AUTO: 82 % (ref 43–75)
NRBC BLD AUTO-RTO: 0 /100 WBCS
PLATELET # BLD AUTO: 468 THOUSANDS/UL (ref 149–390)
PMV BLD AUTO: 9.6 FL (ref 8.9–12.7)
POTASSIUM SERPL-SCNC: 5.2 MMOL/L (ref 3.5–5.3)
RBC # BLD AUTO: 6.44 MILLION/UL (ref 3.88–5.62)
SODIUM SERPL-SCNC: 137 MMOL/L (ref 136–145)
WBC # BLD AUTO: 10.57 THOUSAND/UL (ref 4.31–10.16)

## 2021-12-22 PROCEDURE — 80048 BASIC METABOLIC PNL TOTAL CA: CPT | Performed by: NURSE PRACTITIONER

## 2021-12-22 PROCEDURE — 94760 N-INVAS EAR/PLS OXIMETRY 1: CPT

## 2021-12-22 PROCEDURE — 85025 COMPLETE CBC W/AUTO DIFF WBC: CPT | Performed by: NURSE PRACTITIONER

## 2021-12-22 PROCEDURE — 94640 AIRWAY INHALATION TREATMENT: CPT

## 2021-12-22 PROCEDURE — 99232 SBSQ HOSP IP/OBS MODERATE 35: CPT | Performed by: INTERNAL MEDICINE

## 2021-12-22 PROCEDURE — 82948 REAGENT STRIP/BLOOD GLUCOSE: CPT

## 2021-12-22 RX ADMIN — LEVALBUTEROL HYDROCHLORIDE 1.25 MG: 1.25 SOLUTION, CONCENTRATE RESPIRATORY (INHALATION) at 19:48

## 2021-12-22 RX ADMIN — DEXAMETHASONE SODIUM PHOSPHATE 12.2 MG: 10 INJECTION, SOLUTION INTRAMUSCULAR; INTRAVENOUS at 18:31

## 2021-12-22 RX ADMIN — INSULIN LISPRO 4 UNITS: 100 INJECTION, SOLUTION INTRAVENOUS; SUBCUTANEOUS at 11:31

## 2021-12-22 RX ADMIN — DORNASE ALFA 2.5 MG: 1 SOLUTION RESPIRATORY (INHALATION) at 07:38

## 2021-12-22 RX ADMIN — INSULIN LISPRO 4 UNITS: 100 INJECTION, SOLUTION INTRAVENOUS; SUBCUTANEOUS at 16:23

## 2021-12-22 RX ADMIN — DORNASE ALFA 2.5 MG: 1 SOLUTION RESPIRATORY (INHALATION) at 19:54

## 2021-12-22 RX ADMIN — BARICITINIB 4 MG: 2 TABLET, FILM COATED ORAL at 18:31

## 2021-12-22 RX ADMIN — LEVALBUTEROL HYDROCHLORIDE 1.25 MG: 1.25 SOLUTION, CONCENTRATE RESPIRATORY (INHALATION) at 07:38

## 2021-12-22 RX ADMIN — AMLODIPINE BESYLATE 5 MG: 5 TABLET ORAL at 08:23

## 2021-12-22 RX ADMIN — ENOXAPARIN SODIUM 30 MG: 30 INJECTION SUBCUTANEOUS at 08:23

## 2021-12-22 RX ADMIN — ENOXAPARIN SODIUM 30 MG: 30 INJECTION SUBCUTANEOUS at 22:16

## 2021-12-22 RX ADMIN — SODIUM CHLORIDE SOLN NEBU 3% 4 ML: 3 NEBU SOLN at 19:49

## 2021-12-22 RX ADMIN — LEVALBUTEROL HYDROCHLORIDE 1.25 MG: 1.25 SOLUTION, CONCENTRATE RESPIRATORY (INHALATION) at 14:08

## 2021-12-22 RX ADMIN — INSULIN LISPRO 5 UNITS: 100 INJECTION, SOLUTION INTRAVENOUS; SUBCUTANEOUS at 22:17

## 2021-12-22 RX ADMIN — SODIUM CHLORIDE SOLN NEBU 3% 4 ML: 3 NEBU SOLN at 14:08

## 2021-12-22 RX ADMIN — DEXAMETHASONE SODIUM PHOSPHATE 12.2 MG: 10 INJECTION, SOLUTION INTRAMUSCULAR; INTRAVENOUS at 07:18

## 2021-12-22 RX ADMIN — ATORVASTATIN CALCIUM 40 MG: 40 TABLET, FILM COATED ORAL at 22:16

## 2021-12-22 RX ADMIN — SODIUM CHLORIDE SOLN NEBU 3% 4 ML: 3 NEBU SOLN at 07:38

## 2021-12-23 LAB
ANION GAP SERPL CALCULATED.3IONS-SCNC: 8 MMOL/L (ref 4–13)
BASOPHILS # BLD AUTO: 0.02 THOUSANDS/ΜL (ref 0–0.1)
BASOPHILS NFR BLD AUTO: 0 % (ref 0–1)
BUN SERPL-MCNC: 27 MG/DL (ref 5–25)
CALCIUM SERPL-MCNC: 8.5 MG/DL (ref 8.3–10.1)
CHLORIDE SERPL-SCNC: 97 MMOL/L (ref 100–108)
CO2 SERPL-SCNC: 26 MMOL/L (ref 21–32)
CREAT SERPL-MCNC: 1.45 MG/DL (ref 0.6–1.3)
CRP SERPL QL: 1.1 MG/L
D DIMER PPP FEU-MCNC: 1.1 UG/ML FEU
EOSINOPHIL # BLD AUTO: 0 THOUSAND/ΜL (ref 0–0.61)
EOSINOPHIL NFR BLD AUTO: 0 % (ref 0–6)
ERYTHROCYTE [DISTWIDTH] IN BLOOD BY AUTOMATED COUNT: 13 % (ref 11.6–15.1)
GFR SERPL CREATININE-BSD FRML MDRD: 56 ML/MIN/1.73SQ M
GLUCOSE SERPL-MCNC: 164 MG/DL (ref 65–140)
GLUCOSE SERPL-MCNC: 178 MG/DL (ref 65–140)
GLUCOSE SERPL-MCNC: 211 MG/DL (ref 65–140)
GLUCOSE SERPL-MCNC: 219 MG/DL (ref 65–140)
GLUCOSE SERPL-MCNC: 290 MG/DL (ref 65–140)
GLUCOSE SERPL-MCNC: 314 MG/DL (ref 65–140)
HCT VFR BLD AUTO: 52.5 % (ref 36.5–49.3)
HGB BLD-MCNC: 16.9 G/DL (ref 12–17)
IMM GRANULOCYTES # BLD AUTO: 0.13 THOUSAND/UL (ref 0–0.2)
IMM GRANULOCYTES NFR BLD AUTO: 1 % (ref 0–2)
LYMPHOCYTES # BLD AUTO: 0.98 THOUSANDS/ΜL (ref 0.6–4.47)
LYMPHOCYTES NFR BLD AUTO: 9 % (ref 14–44)
MCH RBC QN AUTO: 25.6 PG (ref 26.8–34.3)
MCHC RBC AUTO-ENTMCNC: 32.2 G/DL (ref 31.4–37.4)
MCV RBC AUTO: 80 FL (ref 82–98)
MONOCYTES # BLD AUTO: 0.72 THOUSAND/ΜL (ref 0.17–1.22)
MONOCYTES NFR BLD AUTO: 7 % (ref 4–12)
NEUTROPHILS # BLD AUTO: 9.25 THOUSANDS/ΜL (ref 1.85–7.62)
NEUTS SEG NFR BLD AUTO: 83 % (ref 43–75)
NRBC BLD AUTO-RTO: 0 /100 WBCS
PLATELET # BLD AUTO: 470 THOUSANDS/UL (ref 149–390)
PMV BLD AUTO: 9.4 FL (ref 8.9–12.7)
POTASSIUM SERPL-SCNC: 5 MMOL/L (ref 3.5–5.3)
PROCALCITONIN SERPL-MCNC: <0.05 NG/ML
RBC # BLD AUTO: 6.59 MILLION/UL (ref 3.88–5.62)
SODIUM SERPL-SCNC: 131 MMOL/L (ref 136–145)
WBC # BLD AUTO: 11.1 THOUSAND/UL (ref 4.31–10.16)

## 2021-12-23 PROCEDURE — 97167 OT EVAL HIGH COMPLEX 60 MIN: CPT

## 2021-12-23 PROCEDURE — 85379 FIBRIN DEGRADATION QUANT: CPT | Performed by: NURSE PRACTITIONER

## 2021-12-23 PROCEDURE — 82948 REAGENT STRIP/BLOOD GLUCOSE: CPT

## 2021-12-23 PROCEDURE — 80048 BASIC METABOLIC PNL TOTAL CA: CPT | Performed by: NURSE PRACTITIONER

## 2021-12-23 PROCEDURE — 94640 AIRWAY INHALATION TREATMENT: CPT

## 2021-12-23 PROCEDURE — 97162 PT EVAL MOD COMPLEX 30 MIN: CPT

## 2021-12-23 PROCEDURE — 94664 DEMO&/EVAL PT USE INHALER: CPT

## 2021-12-23 PROCEDURE — 85025 COMPLETE CBC W/AUTO DIFF WBC: CPT | Performed by: NURSE PRACTITIONER

## 2021-12-23 PROCEDURE — 99232 SBSQ HOSP IP/OBS MODERATE 35: CPT | Performed by: INTERNAL MEDICINE

## 2021-12-23 PROCEDURE — 86140 C-REACTIVE PROTEIN: CPT | Performed by: NURSE PRACTITIONER

## 2021-12-23 PROCEDURE — 94760 N-INVAS EAR/PLS OXIMETRY 1: CPT

## 2021-12-23 PROCEDURE — 84145 PROCALCITONIN (PCT): CPT | Performed by: NURSE PRACTITIONER

## 2021-12-23 RX ORDER — CITALOPRAM 10 MG/1
10 TABLET ORAL DAILY
Status: DISCONTINUED | OUTPATIENT
Start: 2021-12-23 | End: 2021-12-27 | Stop reason: HOSPADM

## 2021-12-23 RX ADMIN — DEXAMETHASONE SODIUM PHOSPHATE 12.2 MG: 10 INJECTION, SOLUTION INTRAMUSCULAR; INTRAVENOUS at 20:46

## 2021-12-23 RX ADMIN — CITALOPRAM HYDROBROMIDE 10 MG: 10 TABLET ORAL at 09:30

## 2021-12-23 RX ADMIN — AMLODIPINE BESYLATE 5 MG: 5 TABLET ORAL at 08:41

## 2021-12-23 RX ADMIN — ENOXAPARIN SODIUM 30 MG: 30 INJECTION SUBCUTANEOUS at 20:46

## 2021-12-23 RX ADMIN — INSULIN LISPRO 6 UNITS: 100 INJECTION, SOLUTION INTRAVENOUS; SUBCUTANEOUS at 17:55

## 2021-12-23 RX ADMIN — INSULIN LISPRO 6 UNITS: 100 INJECTION, SOLUTION INTRAVENOUS; SUBCUTANEOUS at 11:43

## 2021-12-23 RX ADMIN — SODIUM CHLORIDE SOLN NEBU 3% 4 ML: 3 NEBU SOLN at 07:50

## 2021-12-23 RX ADMIN — SODIUM CHLORIDE SOLN NEBU 3% 4 ML: 3 NEBU SOLN at 20:49

## 2021-12-23 RX ADMIN — DEXAMETHASONE SODIUM PHOSPHATE 12.2 MG: 10 INJECTION, SOLUTION INTRAMUSCULAR; INTRAVENOUS at 08:40

## 2021-12-23 RX ADMIN — LEVALBUTEROL HYDROCHLORIDE 1.25 MG: 1.25 SOLUTION, CONCENTRATE RESPIRATORY (INHALATION) at 07:50

## 2021-12-23 RX ADMIN — INSULIN LISPRO 1 UNITS: 100 INJECTION, SOLUTION INTRAVENOUS; SUBCUTANEOUS at 08:43

## 2021-12-23 RX ADMIN — ENOXAPARIN SODIUM 30 MG: 30 INJECTION SUBCUTANEOUS at 08:46

## 2021-12-23 RX ADMIN — LEVALBUTEROL HYDROCHLORIDE 1.25 MG: 1.25 SOLUTION, CONCENTRATE RESPIRATORY (INHALATION) at 13:21

## 2021-12-23 RX ADMIN — BARICITINIB 4 MG: 2 TABLET, FILM COATED ORAL at 20:46

## 2021-12-23 RX ADMIN — INSULIN LISPRO 2 UNITS: 100 INJECTION, SOLUTION INTRAVENOUS; SUBCUTANEOUS at 21:45

## 2021-12-23 RX ADMIN — SODIUM CHLORIDE SOLN NEBU 3% 4 ML: 3 NEBU SOLN at 13:21

## 2021-12-23 RX ADMIN — LEVALBUTEROL HYDROCHLORIDE 1.25 MG: 1.25 SOLUTION, CONCENTRATE RESPIRATORY (INHALATION) at 20:49

## 2021-12-23 RX ADMIN — DORNASE ALFA 2.5 MG: 1 SOLUTION RESPIRATORY (INHALATION) at 07:50

## 2021-12-23 RX ADMIN — ATORVASTATIN CALCIUM 40 MG: 40 TABLET, FILM COATED ORAL at 21:38

## 2021-12-23 RX ADMIN — DORNASE ALFA 2.5 MG: 1 SOLUTION RESPIRATORY (INHALATION) at 20:49

## 2021-12-24 PROBLEM — E87.1 HYPONATREMIA: Status: ACTIVE | Noted: 2021-12-24

## 2021-12-24 PROBLEM — N18.30 CKD (CHRONIC KIDNEY DISEASE) STAGE 3, GFR 30-59 ML/MIN (HCC): Status: ACTIVE | Noted: 2021-12-17

## 2021-12-24 LAB
ANION GAP SERPL CALCULATED.3IONS-SCNC: 12 MMOL/L (ref 4–13)
ANION GAP SERPL CALCULATED.3IONS-SCNC: 6 MMOL/L (ref 4–13)
BASOPHILS # BLD AUTO: 0.02 THOUSANDS/ΜL (ref 0–0.1)
BASOPHILS NFR BLD AUTO: 0 % (ref 0–1)
BUN SERPL-MCNC: 29 MG/DL (ref 5–25)
BUN SERPL-MCNC: 37 MG/DL (ref 5–25)
CALCIUM SERPL-MCNC: 8.6 MG/DL (ref 8.3–10.1)
CALCIUM SERPL-MCNC: 8.9 MG/DL (ref 8.3–10.1)
CHLORIDE SERPL-SCNC: 97 MMOL/L (ref 100–108)
CHLORIDE SERPL-SCNC: 99 MMOL/L (ref 100–108)
CO2 SERPL-SCNC: 19 MMOL/L (ref 21–32)
CO2 SERPL-SCNC: 26 MMOL/L (ref 21–32)
CREAT SERPL-MCNC: 1.26 MG/DL (ref 0.6–1.3)
CREAT SERPL-MCNC: 1.44 MG/DL (ref 0.6–1.3)
EOSINOPHIL # BLD AUTO: 0.01 THOUSAND/ΜL (ref 0–0.61)
EOSINOPHIL NFR BLD AUTO: 0 % (ref 0–6)
ERYTHROCYTE [DISTWIDTH] IN BLOOD BY AUTOMATED COUNT: 13.2 % (ref 11.6–15.1)
GFR SERPL CREATININE-BSD FRML MDRD: 56 ML/MIN/1.73SQ M
GFR SERPL CREATININE-BSD FRML MDRD: 66 ML/MIN/1.73SQ M
GLUCOSE SERPL-MCNC: 199 MG/DL (ref 65–140)
GLUCOSE SERPL-MCNC: 217 MG/DL (ref 65–140)
GLUCOSE SERPL-MCNC: 249 MG/DL (ref 65–140)
GLUCOSE SERPL-MCNC: 257 MG/DL (ref 65–140)
GLUCOSE SERPL-MCNC: 282 MG/DL (ref 65–140)
GLUCOSE SERPL-MCNC: 299 MG/DL (ref 65–140)
HCT VFR BLD AUTO: 54.5 % (ref 36.5–49.3)
HGB BLD-MCNC: 17.8 G/DL (ref 12–17)
IMM GRANULOCYTES # BLD AUTO: 0.13 THOUSAND/UL (ref 0–0.2)
IMM GRANULOCYTES NFR BLD AUTO: 1 % (ref 0–2)
LYMPHOCYTES # BLD AUTO: 1.14 THOUSANDS/ΜL (ref 0.6–4.47)
LYMPHOCYTES NFR BLD AUTO: 8 % (ref 14–44)
MCH RBC QN AUTO: 26.1 PG (ref 26.8–34.3)
MCHC RBC AUTO-ENTMCNC: 32.7 G/DL (ref 31.4–37.4)
MCV RBC AUTO: 80 FL (ref 82–98)
MONOCYTES # BLD AUTO: 1.18 THOUSAND/ΜL (ref 0.17–1.22)
MONOCYTES NFR BLD AUTO: 9 % (ref 4–12)
NEUTROPHILS # BLD AUTO: 11.18 THOUSANDS/ΜL (ref 1.85–7.62)
NEUTS SEG NFR BLD AUTO: 82 % (ref 43–75)
NRBC BLD AUTO-RTO: 0 /100 WBCS
PLATELET # BLD AUTO: 566 THOUSANDS/UL (ref 149–390)
PMV BLD AUTO: 9.4 FL (ref 8.9–12.7)
POTASSIUM SERPL-SCNC: 5 MMOL/L (ref 3.5–5.3)
POTASSIUM SERPL-SCNC: 5 MMOL/L (ref 3.5–5.3)
RBC # BLD AUTO: 6.83 MILLION/UL (ref 3.88–5.62)
SODIUM SERPL-SCNC: 128 MMOL/L (ref 136–145)
SODIUM SERPL-SCNC: 131 MMOL/L (ref 136–145)
WBC # BLD AUTO: 13.66 THOUSAND/UL (ref 4.31–10.16)

## 2021-12-24 PROCEDURE — 99232 SBSQ HOSP IP/OBS MODERATE 35: CPT | Performed by: PHYSICIAN ASSISTANT

## 2021-12-24 PROCEDURE — 94760 N-INVAS EAR/PLS OXIMETRY 1: CPT

## 2021-12-24 PROCEDURE — 85025 COMPLETE CBC W/AUTO DIFF WBC: CPT | Performed by: PHYSICIAN ASSISTANT

## 2021-12-24 PROCEDURE — 94664 DEMO&/EVAL PT USE INHALER: CPT

## 2021-12-24 PROCEDURE — 94640 AIRWAY INHALATION TREATMENT: CPT

## 2021-12-24 PROCEDURE — 82948 REAGENT STRIP/BLOOD GLUCOSE: CPT

## 2021-12-24 PROCEDURE — 80048 BASIC METABOLIC PNL TOTAL CA: CPT | Performed by: PHYSICIAN ASSISTANT

## 2021-12-24 RX ADMIN — CITALOPRAM HYDROBROMIDE 10 MG: 10 TABLET ORAL at 09:49

## 2021-12-24 RX ADMIN — INSULIN LISPRO 4 UNITS: 100 INJECTION, SOLUTION INTRAVENOUS; SUBCUTANEOUS at 10:06

## 2021-12-24 RX ADMIN — DEXAMETHASONE SODIUM PHOSPHATE 12.2 MG: 10 INJECTION, SOLUTION INTRAMUSCULAR; INTRAVENOUS at 10:02

## 2021-12-24 RX ADMIN — SODIUM CHLORIDE SOLN NEBU 3% 4 ML: 3 NEBU SOLN at 14:41

## 2021-12-24 RX ADMIN — SODIUM CHLORIDE SOLN NEBU 3% 4 ML: 3 NEBU SOLN at 08:42

## 2021-12-24 RX ADMIN — ENOXAPARIN SODIUM 30 MG: 30 INJECTION SUBCUTANEOUS at 09:49

## 2021-12-24 RX ADMIN — ENOXAPARIN SODIUM 30 MG: 30 INJECTION SUBCUTANEOUS at 22:56

## 2021-12-24 RX ADMIN — SODIUM CHLORIDE SOLN NEBU 3% 4 ML: 3 NEBU SOLN at 20:30

## 2021-12-24 RX ADMIN — DORNASE ALFA 2.5 MG: 1 SOLUTION RESPIRATORY (INHALATION) at 08:41

## 2021-12-24 RX ADMIN — DEXAMETHASONE SODIUM PHOSPHATE 12.2 MG: 10 INJECTION, SOLUTION INTRAMUSCULAR; INTRAVENOUS at 20:38

## 2021-12-24 RX ADMIN — INSULIN LISPRO 6 UNITS: 100 INJECTION, SOLUTION INTRAVENOUS; SUBCUTANEOUS at 12:56

## 2021-12-24 RX ADMIN — INSULIN LISPRO 6 UNITS: 100 INJECTION, SOLUTION INTRAVENOUS; SUBCUTANEOUS at 17:47

## 2021-12-24 RX ADMIN — AMLODIPINE BESYLATE 5 MG: 5 TABLET ORAL at 09:49

## 2021-12-24 RX ADMIN — LEVALBUTEROL HYDROCHLORIDE 1.25 MG: 1.25 SOLUTION, CONCENTRATE RESPIRATORY (INHALATION) at 08:41

## 2021-12-24 RX ADMIN — SODIUM CHLORIDE 500 ML: 0.9 INJECTION, SOLUTION INTRAVENOUS at 14:31

## 2021-12-24 RX ADMIN — BARICITINIB 4 MG: 2 TABLET, FILM COATED ORAL at 19:18

## 2021-12-24 RX ADMIN — LEVALBUTEROL HYDROCHLORIDE 1.25 MG: 1.25 SOLUTION, CONCENTRATE RESPIRATORY (INHALATION) at 14:41

## 2021-12-24 RX ADMIN — DOCUSATE SODIUM AND SENNOSIDES 2 TABLET: 8.6; 5 TABLET ORAL at 09:48

## 2021-12-24 RX ADMIN — INSULIN LISPRO 2 UNITS: 100 INJECTION, SOLUTION INTRAVENOUS; SUBCUTANEOUS at 22:55

## 2021-12-24 RX ADMIN — ATORVASTATIN CALCIUM 40 MG: 40 TABLET, FILM COATED ORAL at 22:56

## 2021-12-24 RX ADMIN — LEVALBUTEROL HYDROCHLORIDE 1.25 MG: 1.25 SOLUTION, CONCENTRATE RESPIRATORY (INHALATION) at 20:30

## 2021-12-24 RX ADMIN — POLYETHYLENE GLYCOL 3350 17 G: 17 POWDER, FOR SOLUTION ORAL at 09:50

## 2021-12-24 RX ADMIN — DOCUSATE SODIUM AND SENNOSIDES 2 TABLET: 8.6; 5 TABLET ORAL at 17:46

## 2021-12-25 LAB
ANION GAP SERPL CALCULATED.3IONS-SCNC: 11 MMOL/L (ref 4–13)
BUN SERPL-MCNC: 35 MG/DL (ref 5–25)
CALCIUM SERPL-MCNC: 9.3 MG/DL (ref 8.3–10.1)
CHLORIDE SERPL-SCNC: 96 MMOL/L (ref 100–108)
CO2 SERPL-SCNC: 20 MMOL/L (ref 21–32)
CREAT SERPL-MCNC: 1.44 MG/DL (ref 0.6–1.3)
GFR SERPL CREATININE-BSD FRML MDRD: 56 ML/MIN/1.73SQ M
GLUCOSE SERPL-MCNC: 261 MG/DL (ref 65–140)
GLUCOSE SERPL-MCNC: 283 MG/DL (ref 65–140)
GLUCOSE SERPL-MCNC: 307 MG/DL (ref 65–140)
GLUCOSE SERPL-MCNC: 319 MG/DL (ref 65–140)
POTASSIUM SERPL-SCNC: 5.1 MMOL/L (ref 3.5–5.3)
SODIUM SERPL-SCNC: 127 MMOL/L (ref 136–145)

## 2021-12-25 PROCEDURE — 99232 SBSQ HOSP IP/OBS MODERATE 35: CPT | Performed by: PHYSICIAN ASSISTANT

## 2021-12-25 PROCEDURE — 94640 AIRWAY INHALATION TREATMENT: CPT

## 2021-12-25 PROCEDURE — 94761 N-INVAS EAR/PLS OXIMETRY MLT: CPT

## 2021-12-25 PROCEDURE — 94760 N-INVAS EAR/PLS OXIMETRY 1: CPT

## 2021-12-25 PROCEDURE — 80048 BASIC METABOLIC PNL TOTAL CA: CPT | Performed by: PHYSICIAN ASSISTANT

## 2021-12-25 PROCEDURE — 82948 REAGENT STRIP/BLOOD GLUCOSE: CPT

## 2021-12-25 RX ORDER — DEXAMETHASONE 4 MG/1
8 TABLET ORAL DAILY
Status: DISCONTINUED | OUTPATIENT
Start: 2021-12-26 | End: 2021-12-26

## 2021-12-25 RX ORDER — INSULIN GLARGINE 100 [IU]/ML
5 INJECTION, SOLUTION SUBCUTANEOUS
Status: DISCONTINUED | OUTPATIENT
Start: 2021-12-25 | End: 2021-12-27 | Stop reason: HOSPADM

## 2021-12-25 RX ORDER — SODIUM CHLORIDE 9 MG/ML
125 INJECTION, SOLUTION INTRAVENOUS CONTINUOUS
Status: DISCONTINUED | OUTPATIENT
Start: 2021-12-25 | End: 2021-12-27 | Stop reason: HOSPADM

## 2021-12-25 RX ADMIN — SODIUM CHLORIDE SOLN NEBU 3% 4 ML: 3 NEBU SOLN at 19:39

## 2021-12-25 RX ADMIN — INSULIN LISPRO 6 UNITS: 100 INJECTION, SOLUTION INTRAVENOUS; SUBCUTANEOUS at 17:15

## 2021-12-25 RX ADMIN — ATORVASTATIN CALCIUM 40 MG: 40 TABLET, FILM COATED ORAL at 21:50

## 2021-12-25 RX ADMIN — INSULIN LISPRO 5 UNITS: 100 INJECTION, SOLUTION INTRAVENOUS; SUBCUTANEOUS at 17:15

## 2021-12-25 RX ADMIN — ENOXAPARIN SODIUM 30 MG: 30 INJECTION SUBCUTANEOUS at 21:50

## 2021-12-25 RX ADMIN — SODIUM CHLORIDE 125 ML/HR: 0.9 INJECTION, SOLUTION INTRAVENOUS at 17:17

## 2021-12-25 RX ADMIN — SODIUM CHLORIDE SOLN NEBU 3% 4 ML: 3 NEBU SOLN at 14:10

## 2021-12-25 RX ADMIN — CITALOPRAM HYDROBROMIDE 10 MG: 10 TABLET ORAL at 09:05

## 2021-12-25 RX ADMIN — ENOXAPARIN SODIUM 30 MG: 30 INJECTION SUBCUTANEOUS at 09:06

## 2021-12-25 RX ADMIN — SODIUM CHLORIDE 125 ML/HR: 0.9 INJECTION, SOLUTION INTRAVENOUS at 09:10

## 2021-12-25 RX ADMIN — INSULIN GLARGINE 5 UNITS: 100 INJECTION, SOLUTION SUBCUTANEOUS at 21:49

## 2021-12-25 RX ADMIN — DEXAMETHASONE SODIUM PHOSPHATE 12.2 MG: 10 INJECTION, SOLUTION INTRAMUSCULAR; INTRAVENOUS at 21:51

## 2021-12-25 RX ADMIN — INSULIN LISPRO 8 UNITS: 100 INJECTION, SOLUTION INTRAVENOUS; SUBCUTANEOUS at 13:43

## 2021-12-25 RX ADMIN — INSULIN LISPRO 6 UNITS: 100 INJECTION, SOLUTION INTRAVENOUS; SUBCUTANEOUS at 09:08

## 2021-12-25 RX ADMIN — LEVALBUTEROL HYDROCHLORIDE 1.25 MG: 1.25 SOLUTION, CONCENTRATE RESPIRATORY (INHALATION) at 19:39

## 2021-12-25 RX ADMIN — AMLODIPINE BESYLATE 5 MG: 5 TABLET ORAL at 09:05

## 2021-12-25 RX ADMIN — BARICITINIB 4 MG: 2 TABLET, FILM COATED ORAL at 21:50

## 2021-12-25 RX ADMIN — INSULIN LISPRO 8 UNITS: 100 INJECTION, SOLUTION INTRAVENOUS; SUBCUTANEOUS at 21:51

## 2021-12-25 RX ADMIN — SODIUM CHLORIDE SOLN NEBU 3% 4 ML: 3 NEBU SOLN at 07:44

## 2021-12-25 RX ADMIN — LEVALBUTEROL HYDROCHLORIDE 1.25 MG: 1.25 SOLUTION, CONCENTRATE RESPIRATORY (INHALATION) at 07:44

## 2021-12-25 RX ADMIN — DEXAMETHASONE SODIUM PHOSPHATE 12.2 MG: 10 INJECTION, SOLUTION INTRAMUSCULAR; INTRAVENOUS at 09:05

## 2021-12-25 RX ADMIN — INSULIN LISPRO 5 UNITS: 100 INJECTION, SOLUTION INTRAVENOUS; SUBCUTANEOUS at 13:44

## 2021-12-25 RX ADMIN — LEVALBUTEROL HYDROCHLORIDE 1.25 MG: 1.25 SOLUTION, CONCENTRATE RESPIRATORY (INHALATION) at 14:10

## 2021-12-26 LAB
ALBUMIN SERPL BCP-MCNC: 2.5 G/DL (ref 3.5–5)
ALP SERPL-CCNC: 70 U/L (ref 46–116)
ALT SERPL W P-5'-P-CCNC: 277 U/L (ref 12–78)
ANION GAP SERPL CALCULATED.3IONS-SCNC: 7 MMOL/L (ref 4–13)
AST SERPL W P-5'-P-CCNC: 51 U/L (ref 5–45)
BASOPHILS # BLD AUTO: 0.01 THOUSANDS/ΜL (ref 0–0.1)
BASOPHILS NFR BLD AUTO: 0 % (ref 0–1)
BILIRUB SERPL-MCNC: 0.4 MG/DL (ref 0.2–1)
BUN SERPL-MCNC: 30 MG/DL (ref 5–25)
CALCIUM ALBUM COR SERPL-MCNC: 9.4 MG/DL (ref 8.3–10.1)
CALCIUM SERPL-MCNC: 8.2 MG/DL (ref 8.3–10.1)
CHLORIDE SERPL-SCNC: 100 MMOL/L (ref 100–108)
CO2 SERPL-SCNC: 24 MMOL/L (ref 21–32)
CREAT SERPL-MCNC: 1.45 MG/DL (ref 0.6–1.3)
CRP SERPL QL: 0.7 MG/L
EOSINOPHIL # BLD AUTO: 0 THOUSAND/ΜL (ref 0–0.61)
EOSINOPHIL NFR BLD AUTO: 0 % (ref 0–6)
ERYTHROCYTE [DISTWIDTH] IN BLOOD BY AUTOMATED COUNT: 13.2 % (ref 11.6–15.1)
GFR SERPL CREATININE-BSD FRML MDRD: 56 ML/MIN/1.73SQ M
GLUCOSE SERPL-MCNC: 184 MG/DL (ref 65–140)
GLUCOSE SERPL-MCNC: 204 MG/DL (ref 65–140)
GLUCOSE SERPL-MCNC: 269 MG/DL (ref 65–140)
GLUCOSE SERPL-MCNC: 281 MG/DL (ref 65–140)
GLUCOSE SERPL-MCNC: 289 MG/DL (ref 65–140)
HCT VFR BLD AUTO: 50.4 % (ref 36.5–49.3)
HGB BLD-MCNC: 16.4 G/DL (ref 12–17)
IMM GRANULOCYTES # BLD AUTO: 0.14 THOUSAND/UL (ref 0–0.2)
IMM GRANULOCYTES NFR BLD AUTO: 1 % (ref 0–2)
LYMPHOCYTES # BLD AUTO: 1.09 THOUSANDS/ΜL (ref 0.6–4.47)
LYMPHOCYTES NFR BLD AUTO: 8 % (ref 14–44)
MCH RBC QN AUTO: 25.9 PG (ref 26.8–34.3)
MCHC RBC AUTO-ENTMCNC: 32.5 G/DL (ref 31.4–37.4)
MCV RBC AUTO: 80 FL (ref 82–98)
MONOCYTES # BLD AUTO: 0.53 THOUSAND/ΜL (ref 0.17–1.22)
MONOCYTES NFR BLD AUTO: 4 % (ref 4–12)
NEUTROPHILS # BLD AUTO: 12.25 THOUSANDS/ΜL (ref 1.85–7.62)
NEUTS SEG NFR BLD AUTO: 87 % (ref 43–75)
NRBC BLD AUTO-RTO: 0 /100 WBCS
PLATELET # BLD AUTO: 507 THOUSANDS/UL (ref 149–390)
PMV BLD AUTO: 9.4 FL (ref 8.9–12.7)
POTASSIUM SERPL-SCNC: 5 MMOL/L (ref 3.5–5.3)
PROT SERPL-MCNC: 7.3 G/DL (ref 6.4–8.2)
RBC # BLD AUTO: 6.34 MILLION/UL (ref 3.88–5.62)
SODIUM SERPL-SCNC: 131 MMOL/L (ref 136–145)
WBC # BLD AUTO: 14.02 THOUSAND/UL (ref 4.31–10.16)

## 2021-12-26 PROCEDURE — 86140 C-REACTIVE PROTEIN: CPT | Performed by: PHYSICIAN ASSISTANT

## 2021-12-26 PROCEDURE — 94640 AIRWAY INHALATION TREATMENT: CPT

## 2021-12-26 PROCEDURE — 94760 N-INVAS EAR/PLS OXIMETRY 1: CPT

## 2021-12-26 PROCEDURE — 82948 REAGENT STRIP/BLOOD GLUCOSE: CPT

## 2021-12-26 PROCEDURE — 80053 COMPREHEN METABOLIC PANEL: CPT | Performed by: PHYSICIAN ASSISTANT

## 2021-12-26 PROCEDURE — 99233 SBSQ HOSP IP/OBS HIGH 50: CPT | Performed by: PHYSICIAN ASSISTANT

## 2021-12-26 PROCEDURE — 85025 COMPLETE CBC W/AUTO DIFF WBC: CPT | Performed by: PHYSICIAN ASSISTANT

## 2021-12-26 RX ORDER — DEXAMETHASONE 4 MG/1
8 TABLET ORAL DAILY
Status: DISCONTINUED | OUTPATIENT
Start: 2021-12-27 | End: 2021-12-27 | Stop reason: HOSPADM

## 2021-12-26 RX ORDER — ALBUTEROL SULFATE 2.5 MG/3ML
2.5 SOLUTION RESPIRATORY (INHALATION) EVERY 6 HOURS PRN
Status: DISCONTINUED | OUTPATIENT
Start: 2021-12-26 | End: 2021-12-27 | Stop reason: HOSPADM

## 2021-12-26 RX ADMIN — LEVALBUTEROL HYDROCHLORIDE 1.25 MG: 1.25 SOLUTION, CONCENTRATE RESPIRATORY (INHALATION) at 08:03

## 2021-12-26 RX ADMIN — INSULIN LISPRO 5 UNITS: 100 INJECTION, SOLUTION INTRAVENOUS; SUBCUTANEOUS at 08:55

## 2021-12-26 RX ADMIN — INSULIN LISPRO 6 UNITS: 100 INJECTION, SOLUTION INTRAVENOUS; SUBCUTANEOUS at 21:26

## 2021-12-26 RX ADMIN — SODIUM CHLORIDE SOLN NEBU 3% 4 ML: 3 NEBU SOLN at 13:09

## 2021-12-26 RX ADMIN — CITALOPRAM HYDROBROMIDE 10 MG: 10 TABLET ORAL at 08:59

## 2021-12-26 RX ADMIN — DOCUSATE SODIUM AND SENNOSIDES 2 TABLET: 8.6; 5 TABLET ORAL at 18:09

## 2021-12-26 RX ADMIN — LEVALBUTEROL HYDROCHLORIDE 1.25 MG: 1.25 SOLUTION, CONCENTRATE RESPIRATORY (INHALATION) at 13:09

## 2021-12-26 RX ADMIN — DOCUSATE SODIUM AND SENNOSIDES 2 TABLET: 8.6; 5 TABLET ORAL at 08:59

## 2021-12-26 RX ADMIN — INSULIN LISPRO 5 UNITS: 100 INJECTION, SOLUTION INTRAVENOUS; SUBCUTANEOUS at 12:33

## 2021-12-26 RX ADMIN — INSULIN LISPRO 6 UNITS: 100 INJECTION, SOLUTION INTRAVENOUS; SUBCUTANEOUS at 12:33

## 2021-12-26 RX ADMIN — INSULIN LISPRO 2 UNITS: 100 INJECTION, SOLUTION INTRAVENOUS; SUBCUTANEOUS at 08:56

## 2021-12-26 RX ADMIN — INSULIN LISPRO 6 UNITS: 100 INJECTION, SOLUTION INTRAVENOUS; SUBCUTANEOUS at 18:08

## 2021-12-26 RX ADMIN — SODIUM CHLORIDE 125 ML/HR: 0.9 INJECTION, SOLUTION INTRAVENOUS at 18:13

## 2021-12-26 RX ADMIN — ENOXAPARIN SODIUM 30 MG: 30 INJECTION SUBCUTANEOUS at 21:27

## 2021-12-26 RX ADMIN — SODIUM CHLORIDE SOLN NEBU 3% 4 ML: 3 NEBU SOLN at 08:03

## 2021-12-26 RX ADMIN — INSULIN GLARGINE 5 UNITS: 100 INJECTION, SOLUTION SUBCUTANEOUS at 21:27

## 2021-12-26 RX ADMIN — INSULIN LISPRO 5 UNITS: 100 INJECTION, SOLUTION INTRAVENOUS; SUBCUTANEOUS at 18:08

## 2021-12-26 RX ADMIN — BARICITINIB 4 MG: 2 TABLET, FILM COATED ORAL at 18:15

## 2021-12-26 RX ADMIN — DEXAMETHASONE SODIUM PHOSPHATE 12 MG: 10 INJECTION, SOLUTION INTRAMUSCULAR; INTRAVENOUS at 18:09

## 2021-12-26 RX ADMIN — ATORVASTATIN CALCIUM 40 MG: 40 TABLET, FILM COATED ORAL at 21:28

## 2021-12-26 RX ADMIN — AMLODIPINE BESYLATE 5 MG: 5 TABLET ORAL at 08:59

## 2021-12-26 RX ADMIN — DEXAMETHASONE 8 MG: 4 TABLET ORAL at 08:59

## 2021-12-26 RX ADMIN — POLYETHYLENE GLYCOL 3350 17 G: 17 POWDER, FOR SOLUTION ORAL at 09:03

## 2021-12-26 RX ADMIN — ENOXAPARIN SODIUM 30 MG: 30 INJECTION SUBCUTANEOUS at 08:58

## 2021-12-27 VITALS
DIASTOLIC BLOOD PRESSURE: 87 MMHG | TEMPERATURE: 97.7 F | OXYGEN SATURATION: 97 % | SYSTOLIC BLOOD PRESSURE: 122 MMHG | RESPIRATION RATE: 19 BRPM | WEIGHT: 248.46 LBS | HEIGHT: 69 IN | BODY MASS INDEX: 36.8 KG/M2 | HEART RATE: 89 BPM

## 2021-12-27 LAB
ALBUMIN SERPL BCP-MCNC: 2.5 G/DL (ref 3.5–5)
ALP SERPL-CCNC: 72 U/L (ref 46–116)
ALT SERPL W P-5'-P-CCNC: 200 U/L (ref 12–78)
ANION GAP SERPL CALCULATED.3IONS-SCNC: 6 MMOL/L (ref 4–13)
AST SERPL W P-5'-P-CCNC: 29 U/L (ref 5–45)
BILIRUB SERPL-MCNC: 0.4 MG/DL (ref 0.2–1)
BUN SERPL-MCNC: 29 MG/DL (ref 5–25)
CALCIUM ALBUM COR SERPL-MCNC: 9.4 MG/DL (ref 8.3–10.1)
CALCIUM SERPL-MCNC: 8.2 MG/DL (ref 8.3–10.1)
CHLORIDE SERPL-SCNC: 103 MMOL/L (ref 100–108)
CO2 SERPL-SCNC: 26 MMOL/L (ref 21–32)
CREAT SERPL-MCNC: 1.29 MG/DL (ref 0.6–1.3)
DME PARACHUTE DELIVERY DATE ACTUAL: NORMAL
DME PARACHUTE DELIVERY DATE EXPECTED: NORMAL
DME PARACHUTE DELIVERY DATE REQUESTED: NORMAL
DME PARACHUTE DELIVERY NOTE: NORMAL
DME PARACHUTE ITEM DESCRIPTION: NORMAL
DME PARACHUTE ORDER STATUS: NORMAL
DME PARACHUTE SUPPLIER NAME: NORMAL
DME PARACHUTE SUPPLIER PHONE: NORMAL
ERYTHROCYTE [DISTWIDTH] IN BLOOD BY AUTOMATED COUNT: 13.2 % (ref 11.6–15.1)
GFR SERPL CREATININE-BSD FRML MDRD: 64 ML/MIN/1.73SQ M
GLUCOSE SERPL-MCNC: 129 MG/DL (ref 65–140)
GLUCOSE SERPL-MCNC: 145 MG/DL (ref 65–140)
GLUCOSE SERPL-MCNC: 200 MG/DL (ref 65–140)
GLUCOSE SERPL-MCNC: 228 MG/DL (ref 65–140)
HCT VFR BLD AUTO: 49.1 % (ref 36.5–49.3)
HGB BLD-MCNC: 15.9 G/DL (ref 12–17)
MCH RBC QN AUTO: 25.9 PG (ref 26.8–34.3)
MCHC RBC AUTO-ENTMCNC: 32.4 G/DL (ref 31.4–37.4)
MCV RBC AUTO: 80 FL (ref 82–98)
PLATELET # BLD AUTO: 384 THOUSANDS/UL (ref 149–390)
PMV BLD AUTO: 9.2 FL (ref 8.9–12.7)
POTASSIUM SERPL-SCNC: 4.6 MMOL/L (ref 3.5–5.3)
PROT SERPL-MCNC: 6.5 G/DL (ref 6.4–8.2)
RBC # BLD AUTO: 6.13 MILLION/UL (ref 3.88–5.62)
SODIUM SERPL-SCNC: 135 MMOL/L (ref 136–145)
WBC # BLD AUTO: 12.29 THOUSAND/UL (ref 4.31–10.16)

## 2021-12-27 PROCEDURE — 85027 COMPLETE CBC AUTOMATED: CPT | Performed by: PHYSICIAN ASSISTANT

## 2021-12-27 PROCEDURE — 94761 N-INVAS EAR/PLS OXIMETRY MLT: CPT

## 2021-12-27 PROCEDURE — 99239 HOSP IP/OBS DSCHRG MGMT >30: CPT | Performed by: STUDENT IN AN ORGANIZED HEALTH CARE EDUCATION/TRAINING PROGRAM

## 2021-12-27 PROCEDURE — 94760 N-INVAS EAR/PLS OXIMETRY 1: CPT

## 2021-12-27 PROCEDURE — 82948 REAGENT STRIP/BLOOD GLUCOSE: CPT

## 2021-12-27 PROCEDURE — 80053 COMPREHEN METABOLIC PANEL: CPT | Performed by: PHYSICIAN ASSISTANT

## 2021-12-27 RX ORDER — DEXAMETHASONE 4 MG/1
4 TABLET ORAL DAILY
Qty: 3 TABLET | Refills: 0 | Status: SHIPPED | OUTPATIENT
Start: 2022-01-02 | End: 2022-01-05

## 2021-12-27 RX ORDER — DEXAMETHASONE 0.5 MG/1
2 TABLET ORAL DAILY
Status: DISCONTINUED | OUTPATIENT
Start: 2022-01-05 | End: 2021-12-27 | Stop reason: HOSPADM

## 2021-12-27 RX ORDER — DEXAMETHASONE 4 MG/1
4 TABLET ORAL DAILY
Status: DISCONTINUED | OUTPATIENT
Start: 2022-01-02 | End: 2021-12-27 | Stop reason: HOSPADM

## 2021-12-27 RX ORDER — DEXAMETHASONE 4 MG/1
6 TABLET ORAL DAILY
Status: DISCONTINUED | OUTPATIENT
Start: 2021-12-30 | End: 2021-12-27 | Stop reason: HOSPADM

## 2021-12-27 RX ORDER — DEXAMETHASONE 4 MG/1
8 TABLET ORAL DAILY
Qty: 4 TABLET | Refills: 0 | Status: SHIPPED | OUTPATIENT
Start: 2021-12-28 | End: 2021-12-30

## 2021-12-27 RX ORDER — BENZONATATE 100 MG/1
100 CAPSULE ORAL 3 TIMES DAILY PRN
Qty: 20 CAPSULE | Refills: 0 | Status: SHIPPED | OUTPATIENT
Start: 2021-12-27 | End: 2022-03-09

## 2021-12-27 RX ORDER — DEXAMETHASONE 2 MG/1
2 TABLET ORAL DAILY
Qty: 3 TABLET | Refills: 0 | Status: SHIPPED | OUTPATIENT
Start: 2022-01-05 | End: 2022-01-08

## 2021-12-27 RX ORDER — DEXAMETHASONE 6 MG/1
6 TABLET ORAL DAILY
Qty: 3 TABLET | Refills: 0 | Status: SHIPPED | OUTPATIENT
Start: 2021-12-30 | End: 2022-01-02

## 2021-12-27 RX ORDER — ATORVASTATIN CALCIUM 40 MG/1
40 TABLET, FILM COATED ORAL
Qty: 14 TABLET | Refills: 0 | Status: SHIPPED | OUTPATIENT
Start: 2021-12-27 | End: 2022-03-09

## 2021-12-27 RX ADMIN — DOCUSATE SODIUM AND SENNOSIDES 2 TABLET: 8.6; 5 TABLET ORAL at 08:27

## 2021-12-27 RX ADMIN — POLYETHYLENE GLYCOL 3350 17 G: 17 POWDER, FOR SOLUTION ORAL at 08:30

## 2021-12-27 RX ADMIN — INSULIN LISPRO 5 UNITS: 100 INJECTION, SOLUTION INTRAVENOUS; SUBCUTANEOUS at 08:30

## 2021-12-27 RX ADMIN — ENOXAPARIN SODIUM 30 MG: 30 INJECTION SUBCUTANEOUS at 08:27

## 2021-12-27 RX ADMIN — CITALOPRAM HYDROBROMIDE 10 MG: 10 TABLET ORAL at 08:28

## 2021-12-27 RX ADMIN — SODIUM CHLORIDE 125 ML/HR: 0.9 INJECTION, SOLUTION INTRAVENOUS at 03:00

## 2021-12-27 RX ADMIN — AMLODIPINE BESYLATE 5 MG: 5 TABLET ORAL at 08:28

## 2021-12-27 RX ADMIN — DEXAMETHASONE 8 MG: 4 TABLET ORAL at 08:30

## 2021-12-28 ENCOUNTER — TRANSITIONAL CARE MANAGEMENT (OUTPATIENT)
Dept: FAMILY MEDICINE CLINIC | Facility: CLINIC | Age: 51
End: 2021-12-28

## 2021-12-29 ENCOUNTER — OFFICE VISIT (OUTPATIENT)
Dept: FAMILY MEDICINE CLINIC | Facility: CLINIC | Age: 51
End: 2021-12-29
Payer: COMMERCIAL

## 2021-12-29 VITALS — HEART RATE: 82 BPM | OXYGEN SATURATION: 96 %

## 2021-12-29 DIAGNOSIS — U07.1 COVID-19: ICD-10-CM

## 2021-12-29 DIAGNOSIS — Z99.81 REQUIRES SUPPLEMENTAL OXYGEN: ICD-10-CM

## 2021-12-29 DIAGNOSIS — Z76.89 ENCOUNTER FOR SUPPORT AND COORDINATION OF TRANSITION OF CARE: Primary | ICD-10-CM

## 2021-12-29 DIAGNOSIS — J96.01 ACUTE HYPOXEMIC RESPIRATORY FAILURE DUE TO COVID-19 (HCC): ICD-10-CM

## 2021-12-29 DIAGNOSIS — E66.01 CLASS 3 SEVERE OBESITY DUE TO EXCESS CALORIES WITH SERIOUS COMORBIDITY AND BODY MASS INDEX (BMI) OF 40.0 TO 44.9 IN ADULT (HCC): Chronic | ICD-10-CM

## 2021-12-29 DIAGNOSIS — U07.1 PNEUMONIA DUE TO COVID-19 VIRUS: ICD-10-CM

## 2021-12-29 DIAGNOSIS — U07.1 ACUTE HYPOXEMIC RESPIRATORY FAILURE DUE TO COVID-19 (HCC): ICD-10-CM

## 2021-12-29 DIAGNOSIS — J12.82 PNEUMONIA DUE TO COVID-19 VIRUS: ICD-10-CM

## 2021-12-29 DIAGNOSIS — R06.00 DYSPNEA ON EXERTION: ICD-10-CM

## 2021-12-29 PROCEDURE — 99496 TRANSJ CARE MGMT HIGH F2F 7D: CPT | Performed by: INTERNAL MEDICINE

## 2021-12-29 RX ORDER — ALBUTEROL SULFATE 90 UG/1
2 AEROSOL, METERED RESPIRATORY (INHALATION) EVERY 6 HOURS PRN
Qty: 18 G | Refills: 5 | Status: SHIPPED | OUTPATIENT
Start: 2021-12-29 | End: 2022-03-09

## 2022-01-06 ENCOUNTER — APPOINTMENT (OUTPATIENT)
Dept: RADIOLOGY | Facility: CLINIC | Age: 52
End: 2022-01-06
Payer: COMMERCIAL

## 2022-01-06 DIAGNOSIS — U07.1 PNEUMONIA DUE TO COVID-19 VIRUS: ICD-10-CM

## 2022-01-06 DIAGNOSIS — J12.82 PNEUMONIA DUE TO COVID-19 VIRUS: ICD-10-CM

## 2022-01-06 DIAGNOSIS — R06.00 DYSPNEA ON EXERTION: ICD-10-CM

## 2022-01-06 PROCEDURE — 71046 X-RAY EXAM CHEST 2 VIEWS: CPT

## 2022-01-14 ENCOUNTER — OFFICE VISIT (OUTPATIENT)
Dept: PULMONOLOGY | Facility: CLINIC | Age: 52
End: 2022-01-14
Payer: COMMERCIAL

## 2022-01-14 VITALS
BODY MASS INDEX: 38.09 KG/M2 | WEIGHT: 257.2 LBS | SYSTOLIC BLOOD PRESSURE: 138 MMHG | OXYGEN SATURATION: 94 % | TEMPERATURE: 98 F | HEART RATE: 123 BPM | HEIGHT: 69 IN | RESPIRATION RATE: 20 BRPM | DIASTOLIC BLOOD PRESSURE: 100 MMHG

## 2022-01-14 DIAGNOSIS — J96.01 ACUTE HYPOXEMIC RESPIRATORY FAILURE DUE TO COVID-19 (HCC): ICD-10-CM

## 2022-01-14 DIAGNOSIS — E66.01 CLASS 3 SEVERE OBESITY DUE TO EXCESS CALORIES WITH SERIOUS COMORBIDITY AND BODY MASS INDEX (BMI) OF 40.0 TO 44.9 IN ADULT (HCC): Chronic | ICD-10-CM

## 2022-01-14 DIAGNOSIS — J12.82 PNEUMONIA DUE TO COVID-19 VIRUS: Primary | ICD-10-CM

## 2022-01-14 DIAGNOSIS — U07.1 PNEUMONIA DUE TO COVID-19 VIRUS: Primary | ICD-10-CM

## 2022-01-14 DIAGNOSIS — U07.1 ACUTE HYPOXEMIC RESPIRATORY FAILURE DUE TO COVID-19 (HCC): ICD-10-CM

## 2022-01-14 DIAGNOSIS — R93.89 ABNORMAL CXR: ICD-10-CM

## 2022-01-14 DIAGNOSIS — I10 HYPERTENSION, UNSPECIFIED TYPE: ICD-10-CM

## 2022-01-14 PROCEDURE — 3008F BODY MASS INDEX DOCD: CPT | Performed by: NURSE PRACTITIONER

## 2022-01-14 PROCEDURE — 1111F DSCHRG MED/CURRENT MED MERGE: CPT | Performed by: NURSE PRACTITIONER

## 2022-01-14 PROCEDURE — 99214 OFFICE O/P EST MOD 30 MIN: CPT | Performed by: NURSE PRACTITIONER

## 2022-01-14 PROCEDURE — 1036F TOBACCO NON-USER: CPT | Performed by: NURSE PRACTITIONER

## 2022-01-14 PROCEDURE — 94618 PULMONARY STRESS TESTING: CPT | Performed by: NURSE PRACTITIONER

## 2022-01-14 RX ORDER — AMLODIPINE BESYLATE 5 MG/1
TABLET ORAL
Qty: 90 TABLET | Refills: 1 | Status: SHIPPED | OUTPATIENT
Start: 2022-01-14 | End: 2022-06-07 | Stop reason: SDUPTHER

## 2022-01-14 NOTE — PROGRESS NOTES
Pulmonary Follow-Up Note   Colletta Six  54 y o  male MRN: [id number]  1/14/2022      Assessment/Plan:    Problem List Items Addressed This Visit        Respiratory    Acute hypoxemic respiratory failure due to COVID-19 [facility])     Walk test revealed need for continued 2 liters of supplemental oxygen with ambulation  He is aware of this  We discussed risks of not wearing his oxygen as well as benefits of continued use  He is in agreement  Relevant Orders    POCT 6 minute walk (Completed)    Pneumonia due to COVID-19 virus - Primary     He is recovering well  He will have a repeat CT of the chest to evaluate for resolution of pneumonia in approximately one and half months  Relevant Orders    POCT 6 minute walk (Completed)    XR chest pa & lateral       Other    Class 3 severe obesity in adult [facility]) (Chronic)     Lifestyle modifications and weight loss as able  Abnormal CXR     Repeat imaging as listed  Return in about 2 months (around 3/14/2022), or if symptoms worsen or fail to improve  All of Pa's questions were answered prior to leaving the office today  He will follow-up with Dr Ivette Mejia in 2-3 months or sooner should the need arise  He is aware to call our office with any further questions or concerns  History of Present Illness   Reason for Visit: Hospital follow-up  Chief Complaint:  I feel much better    HPI: Colletta Six  is a 54 y o  male who presents to the office today for follow-up after recent hospital stay for COVID pneumonia and hypoxic respiratory failure  He is present with his wife  He reports he is feeling much better and has very little fatigue or shortness of breath on exertion  He does not have any cough or sputum  He has not had any chest pain/leg pain or swelling and does not have any fevers or chills    His wife reports that he left the hospital prematurely according to medical advice and was sent home on 5 liters of oxygen  He since has not been wearing his oxygen because he feels he is doing well and is not short of breath  Aside from the above, he has no other complaints  Review of Systems   All other systems reviewed and are negative  A full 12-point review of systems was completed and is negative except for those outlined in the HPI      Historical Information   Past Medical History:   Diagnosis Date    Aneurysm of thoracic aorta (HCC)     Cardiomegaly     HBP (high blood pressure)     Hyperglycemia     Lab test positive for detection of COVID-19 virus 12/12/2021    Low back pain     Meralgia paresthetica, right lower limb     meralgia paresthetica of right leg    Obesity     Pneumonia due to COVID-19 virus 12/16/2021    Bilateral COVID 19 pneumonia    Seasonal allergies     Vitamin D deficiency     Wears glasses      Past Surgical History:   Procedure Laterality Date    NO PAST SURGERIES       Family History   Problem Relation Age of Onset    Breast cancer Sister     Diabetes Brother     Blindness Mother     No Known Problems Father     Sickle cell trait Son     Stroke Maternal Aunt     Down syndrome Sister      Social History   Social History     Substance and Sexual Activity   Alcohol Use Yes     Social History     Substance and Sexual Activity   Drug Use Never     Social History     Tobacco Use   Smoking Status Never Smoker   Smokeless Tobacco Never Used     E-Cigarette/Vaping    E-Cigarette Use Never User      E-Cigarette/Vaping Substances    Nicotine No     THC No     CBD No     Flavoring No     Other No     Unknown No        Meds/Allergies     Current Outpatient Medications:     albuterol (Ventolin HFA) 90 mcg/act inhaler, Inhale 2 puffs every 6 (six) hours as needed for wheezing, Disp: 18 g, Rfl: 5    amLODIPine (NORVASC) 5 mg tablet, TAKE 1 TABLET BY MOUTH EVERY DAY, Disp: 90 tablet, Rfl: 1    atorvastatin (LIPITOR) 40 mg tablet, Take 1 tablet (40 mg total) by mouth daily at bedtime for 14 days, Disp: 14 tablet, Rfl: 0    benzonatate (TESSALON PERLES) 100 mg capsule, Take 1 capsule (100 mg total) by mouth 3 (three) times a day as needed for cough (Patient not taking: Reported on 1/14/2022 ), Disp: 20 capsule, Rfl: 0    ondansetron (ZOFRAN) 4 mg tablet, Take 1 tablet (4 mg total) by mouth every 8 (eight) hours as needed for nausea or vomiting (Patient not taking: Reported on 1/14/2022 ), Disp: 30 tablet, Rfl: 0  No Known Allergies    Vitals: Blood pressure 138/100, pulse (!) 123, temperature 98 °F (36 7 °C), temperature source Tympanic, resp  rate 20, height 5' 9" (1 753 m), weight 117 kg (257 lb 3 2 oz), SpO2 94 %  Body mass index is 37 98 kg/m²  Oxygen Therapy  SpO2: 94 %  Oxygen Therapy: None (Room air)    Physical Exam:  Physical Exam  Vitals reviewed  Constitutional:       General: He is not in acute distress  Appearance: He is well-developed  He is not toxic-appearing or diaphoretic  HENT:      Head: Normocephalic and atraumatic  Eyes:      General: No scleral icterus  Neck:      Trachea: No tracheal deviation  Cardiovascular:      Rate and Rhythm: Normal rate and regular rhythm  Heart sounds: S1 normal and S2 normal  No murmur heard  No friction rub  No gallop  Pulmonary:      Effort: Pulmonary effort is normal  No tachypnea, accessory muscle usage or respiratory distress  Breath sounds: Normal breath sounds  No stridor  No decreased breath sounds, wheezing, rhonchi or rales  Chest:      Chest wall: No tenderness  Abdominal:      General: Bowel sounds are normal  There is no distension  Palpations: Abdomen is soft  Tenderness: There is no abdominal tenderness  Musculoskeletal:         General: No tenderness  Cervical back: Neck supple  Skin:     General: Skin is warm and dry  Findings: No rash  Neurological:      Mental Status: He is alert and oriented to person, place, and time  GCS: GCS eye subscore is 4   GCS verbal subscore is 5  GCS motor subscore is 6  Psychiatric:         Speech: Speech normal          Behavior: Behavior normal  Behavior is cooperative  None new labs or diagnostic testing    Today's Testing:     Six Minute Walk Test:  Walk test was initiated on room air with saturations 93% and heart rate 124 beats per minute  He ambulated for 2 minutes and saturations dropped to 78%  He was placed on 2 liters of oxygen and maintain saturations 91 to 96% with heart rate 106 beats per minute  He completed 216 meters of walking  DEMETRIO Méndez  [facility]        Portions of the record may have been created with voice recognition software  Occasional wrong word or "sound a like" substitutions may have occurred due to the inherent limitations of voice recognition software  Read the chart carefully and recognize, using context, where substitutions have occurred or contact the dictating provider

## 2022-01-14 NOTE — ASSESSMENT & PLAN NOTE
Walk test revealed need for continued 2 liters of supplemental oxygen with ambulation  He is aware of this  We discussed risks of not wearing his oxygen as well as benefits of continued use  He is in agreement

## 2022-01-14 NOTE — ASSESSMENT & PLAN NOTE
He is recovering well  He will have a repeat CT of the chest to evaluate for resolution of pneumonia in approximately one and half months

## 2022-03-01 ENCOUNTER — APPOINTMENT (OUTPATIENT)
Dept: RADIOLOGY | Facility: CLINIC | Age: 52
End: 2022-03-01
Payer: COMMERCIAL

## 2022-03-01 DIAGNOSIS — J12.82 PNEUMONIA DUE TO COVID-19 VIRUS: ICD-10-CM

## 2022-03-01 DIAGNOSIS — U07.1 PNEUMONIA DUE TO COVID-19 VIRUS: ICD-10-CM

## 2022-03-01 PROCEDURE — 71046 X-RAY EXAM CHEST 2 VIEWS: CPT

## 2022-03-08 ENCOUNTER — OFFICE VISIT (OUTPATIENT)
Dept: PULMONOLOGY | Facility: CLINIC | Age: 52
End: 2022-03-08
Payer: COMMERCIAL

## 2022-03-08 ENCOUNTER — APPOINTMENT (OUTPATIENT)
Dept: LAB | Facility: AMBULARY SURGERY CENTER | Age: 52
End: 2022-03-08
Payer: COMMERCIAL

## 2022-03-08 ENCOUNTER — CLINICAL SUPPORT (OUTPATIENT)
Dept: FAMILY MEDICINE CLINIC | Facility: CLINIC | Age: 52
End: 2022-03-08

## 2022-03-08 VITALS
SYSTOLIC BLOOD PRESSURE: 134 MMHG | TEMPERATURE: 98.2 F | BODY MASS INDEX: 37.18 KG/M2 | HEART RATE: 99 BPM | HEIGHT: 69 IN | RESPIRATION RATE: 16 BRPM | WEIGHT: 251 LBS | DIASTOLIC BLOOD PRESSURE: 68 MMHG

## 2022-03-08 DIAGNOSIS — J96.01 ACUTE HYPOXEMIC RESPIRATORY FAILURE DUE TO COVID-19 (HCC): ICD-10-CM

## 2022-03-08 DIAGNOSIS — J12.82 PNEUMONIA DUE TO COVID-19 VIRUS: ICD-10-CM

## 2022-03-08 DIAGNOSIS — U07.1 PNEUMONIA DUE TO COVID-19 VIRUS: ICD-10-CM

## 2022-03-08 DIAGNOSIS — U07.1 PNEUMONIA DUE TO COVID-19 VIRUS: Primary | ICD-10-CM

## 2022-03-08 DIAGNOSIS — J12.82 PNEUMONIA DUE TO COVID-19 VIRUS: Primary | ICD-10-CM

## 2022-03-08 DIAGNOSIS — U07.1 ACUTE HYPOXEMIC RESPIRATORY FAILURE DUE TO COVID-19 (HCC): ICD-10-CM

## 2022-03-08 DIAGNOSIS — Z23 COVID-19 VACCINE ADMINISTERED: Primary | ICD-10-CM

## 2022-03-08 PROBLEM — R63.1 POLYDIPSIA: Status: ACTIVE | Noted: 2022-03-08

## 2022-03-08 LAB
EST. AVERAGE GLUCOSE BLD GHB EST-MCNC: >355 MG/DL
HBA1C MFR BLD: >14 %

## 2022-03-08 PROCEDURE — 99215 OFFICE O/P EST HI 40 MIN: CPT | Performed by: INTERNAL MEDICINE

## 2022-03-08 PROCEDURE — 91305 PR SARSCOV2 VACCINE 30MCG/0.3ML TRIS-SUCROSE IM USE: CPT

## 2022-03-08 PROCEDURE — 83036 HEMOGLOBIN GLYCOSYLATED A1C: CPT

## 2022-03-08 PROCEDURE — 36415 COLL VENOUS BLD VENIPUNCTURE: CPT

## 2022-03-08 PROCEDURE — 94618 PULMONARY STRESS TESTING: CPT | Performed by: INTERNAL MEDICINE

## 2022-03-08 NOTE — ASSESSMENT & PLAN NOTE
Patient is complaining about drinking much more water throughout the day that he used to he is always thirsty given his blood sugar problems the hospitalized ask for hemoglobin A1c

## 2022-03-08 NOTE — PROGRESS NOTES
Assessment/Plan:    Acute hypoxemic respiratory failure due to COVID-19 [facility])  I reviewed patient's chest x-ray from last week which still shows residual ground[facility] opacities [facility]  His symptoms are improved will repeat his chest x-ray in 3 more months  Diagnoses and all orders for this visit:    Pneumonia due to COVID-19 virus  -     POCT 6 minute walk    Acute hypoxemic respiratory failure due to COVID-19 [facility])          Subjective:      Patient ID: Salas Anderson  is a 54 y o  male  Mr Cassia Wodo is here for follow-up of his COVID pneumonia  He feels his symptoms are better he is able to do more things without getting short of breath  He feels back to his baseline  He denies any cough chest pain or leg swelling  The following portions of the patient's history were reviewed and updated as appropriate: allergies, current medications, past family history, past medical history, past social history, past surgical history and problem list     Review of Systems   Constitutional: Negative  HENT: Negative  Eyes: Negative  Respiratory: Negative for shortness of breath  Cardiovascular: Negative  Gastrointestinal: Negative  Endocrine: Negative  Genitourinary: Negative  Allergic/Immunologic: Negative  Neurological: Negative  Hematological: Negative  Psychiatric/Behavioral: Negative  Objective:      /68 (BP Location: Right arm, Patient Position: Sitting, Cuff Size: Adult)   Pulse 99   Temp 98 2 °F (36 8 °C) (Tympanic)   Resp 16   Ht 5' 9" (1 753 m)   Wt 114 kg (251 lb)   BMI 37 07 kg/m²          Physical Exam  Constitutional:       Appearance: He is well-developed  HENT:      Head: Normocephalic  Eyes:      Pupils: Pupils are equal, round, and reactive to light  Cardiovascular:      Rate and Rhythm: Normal rate  Pulmonary:      Effort: Pulmonary effort is normal  No respiratory distress  Breath sounds: Rales present   No wheezing  Abdominal:      Palpations: Abdomen is soft  Musculoskeletal:         General: Normal range of motion  Cervical back: Neck supple  Skin:     General: Skin is warm and dry  Neurological:      Mental Status: He is alert and oriented to person, place, and time

## 2022-03-08 NOTE — ASSESSMENT & PLAN NOTE
I reviewed patient's chest x-ray from last week which still shows residual ground-glass opacities from [facility] in December  His symptoms are improved will repeat his chest x-ray in 3 more months  He did well the 6 minute walk test and I discontinued his home oxygen

## 2022-03-09 ENCOUNTER — OFFICE VISIT (OUTPATIENT)
Dept: FAMILY MEDICINE CLINIC | Facility: CLINIC | Age: 52
End: 2022-03-09
Payer: COMMERCIAL

## 2022-03-09 VITALS
BODY MASS INDEX: 37.33 KG/M2 | DIASTOLIC BLOOD PRESSURE: 72 MMHG | OXYGEN SATURATION: 99 % | HEART RATE: 101 BPM | RESPIRATION RATE: 16 BRPM | WEIGHT: 252 LBS | SYSTOLIC BLOOD PRESSURE: 120 MMHG | TEMPERATURE: 97.3 F | HEIGHT: 69 IN

## 2022-03-09 DIAGNOSIS — U07.1 ACUTE HYPOXEMIC RESPIRATORY FAILURE DUE TO COVID-19 (HCC): ICD-10-CM

## 2022-03-09 DIAGNOSIS — E09.9 STEROID-INDUCED DIABETES MELLITUS, INITIAL ENCOUNTER (HCC): Primary | ICD-10-CM

## 2022-03-09 DIAGNOSIS — T38.0X5A STEROID-INDUCED DIABETES MELLITUS, INITIAL ENCOUNTER (HCC): Primary | ICD-10-CM

## 2022-03-09 DIAGNOSIS — I10 HYPERTENSION, UNSPECIFIED TYPE: Chronic | ICD-10-CM

## 2022-03-09 DIAGNOSIS — J96.01 ACUTE HYPOXEMIC RESPIRATORY FAILURE DUE TO COVID-19 (HCC): ICD-10-CM

## 2022-03-09 DIAGNOSIS — Z11.59 NEED FOR HEPATITIS C SCREENING TEST: ICD-10-CM

## 2022-03-09 DIAGNOSIS — E08.9 DIABETES MELLITUS DUE TO UNDERLYING CONDITION WITHOUT COMPLICATION, UNSPECIFIED WHETHER LONG TERM INSULIN USE (HCC): ICD-10-CM

## 2022-03-09 PROCEDURE — 99214 OFFICE O/P EST MOD 30 MIN: CPT | Performed by: INTERNAL MEDICINE

## 2022-03-09 PROCEDURE — 1036F TOBACCO NON-USER: CPT | Performed by: INTERNAL MEDICINE

## 2022-03-09 PROCEDURE — 3725F SCREEN DEPRESSION PERFORMED: CPT | Performed by: INTERNAL MEDICINE

## 2022-03-09 RX ORDER — LANCETS
EACH MISCELLANEOUS
Qty: 100 EACH | Refills: 5 | Status: SHIPPED | OUTPATIENT
Start: 2022-03-09

## 2022-03-09 RX ORDER — BLOOD SUGAR DIAGNOSTIC
STRIP MISCELLANEOUS
Qty: 100 STRIP | Refills: 5 | Status: SHIPPED | OUTPATIENT
Start: 2022-03-09 | End: 2022-04-15 | Stop reason: SDUPTHER

## 2022-03-09 RX ORDER — FLASH GLUCOSE SENSOR
KIT MISCELLANEOUS
Qty: 1 EACH | Refills: 5 | Status: SHIPPED | OUTPATIENT
Start: 2022-03-09 | End: 2022-03-09

## 2022-03-09 RX ORDER — BLOOD-GLUCOSE METER
EACH MISCELLANEOUS
Qty: 1 KIT | Refills: 0 | Status: SHIPPED | OUTPATIENT
Start: 2022-03-09

## 2022-03-09 RX ORDER — FLASH GLUCOSE SCANNING READER
EACH MISCELLANEOUS
Qty: 1 EACH | Refills: 5 | Status: SHIPPED | OUTPATIENT
Start: 2022-03-09 | End: 2022-03-09

## 2022-03-09 RX ORDER — INSULIN GLARGINE 100 [IU]/ML
12 INJECTION, SOLUTION SUBCUTANEOUS DAILY
Qty: 15 ML | Refills: 5 | Status: SHIPPED | OUTPATIENT
Start: 2022-03-09 | End: 2022-03-10

## 2022-03-09 RX ORDER — INSULIN DEGLUDEC INJECTION 100 U/ML
12 INJECTION, SOLUTION SUBCUTANEOUS DAILY
Qty: 15 ML | Refills: 5 | Status: SHIPPED | OUTPATIENT
Start: 2022-03-09 | End: 2022-03-09

## 2022-03-09 NOTE — PROGRESS NOTES
BMI Counseling: Body mass index is 37 21 kg/m²  The BMI is above normal  Nutrition recommendations include reducing portion sizes, decreasing overall calorie intake, 3-5 servings of fruits/vegetables daily, reducing fast food intake, consuming healthier snacks, decreasing soda and/or juice intake, moderation in carbohydrate intake, increasing intake of lean protein, reducing intake of saturated fat and trans fat and reducing intake of cholesterol  Exercise recommendations include exercising 3-5 times per week and strength training exercises  Assessment/Plan:    Diabetes mellitus due to underlying condition [facility])    Lab Results   Component Value Date    HGBA1C >14 0 (H) 03/08/2022   It is possible that Yuri Doyle is still dealing with an element of SIHG (steroid induced hyperglycemia)-however, he's been off of steroids since Jan and A1c yesterday was what it was  Yuri Doyle has been obese for awhile, and the question becomes is some of this insulin resistance, diet related, or even an autoimmune process brought on by the covid he had in Dec-in any event, with the A1c being >14%,and with him being symptomatic, we have to treat it  We talked at length about dietary changes-he is interested in the free nutrition program over at [facility] where they can teach him how to eat, carb count, etc  has to cut back on sugars, carbs in his diet  We are going to try and start some insulin as well   we had some Tresiba samples and actually ended up giving him 12 units and showed him how to use the pen, pen needles etc-we'll try Tresiba 12 units daily and advised if sugars are running high they may need to increase by 2 units per day  I am also going to add some metformin hopefully as a sensitizer to take with his largest meal of the day  Went over the side effects and benefits of metformin  Need to order routine diabetic labs to be done before the next visit    We are also going to try to get him a continuous glucose monitor, provided insurance covers it  Hypertension  Well controlled       Diagnoses and all orders for this visit:    Steroid-induced diabetes mellitus, initial encounter ([facility] [address] )  -     Continuous Blood Gluc Sensor (FreeStyle Tamar 14 Day Sensor) MISC; USE AS DIRECTED  -     Continuous Blood Gluc  (FreeStyle Tamar 14 Day [place]) LARRY; USE AS DIRECTED  -     insulin degludec Ardell Tommy FlexTouch) 100 units/mL injection pen; Inject 12 Units under the skin daily  -     metFORMIN (GLUCOPHAGE) 500 mg tablet; TAKE 1 TABLET PO DAILY WITH LARGEST MEAL    BMI 37 0-37 9, adult    Diabetes mellitus due to underlying condition without complication, unspecified whether long term insulin use (HCC)  -     CBC and differential; Future  -     Comprehensive metabolic panel; Future  -     Lipid panel; Future  -     Microalbumin / creatinine urine ratio  -     TSH, 3rd generation; Future    Acute hypoxemic respiratory failure due to COVID-19 [facility])    Hypertension, unspecified type  -     CBC and differential; Future  -     Comprehensive metabolic panel; Future    Need for hepatitis C screening test  -     Hepatitis C antibody; Future          Subjective:      Patient ID: Aaron Nicole  is a 54 y o  male  Lali Kirby here because yesterday, after seeing pulmonary in follow up, patient had reported polydipsia and polyuria  An A1c % was ordered and came back >14%  Ailin Pagan Lali Kirby was hospitalized in December with severe covid, covid pneumonitis and was on heavy doses of steroids in the hospital and at home-was on oxygen for quite some time -last steroids were at the beginning of January  He has been going through 35 water bottles a day, up drinking water at night and is urinating all the time  When he had labwork done in June his FBS was 100  In the hospital there were quite a few sugars in the 200-300 range   He denies vision changes or neuropathy symptoms      The following portions of the patient's history were reviewed and updated as appropriate: current medications, past family history, past medical history, past social history, past surgical history and problem list     Review of Systems   Constitutional: Positive for unexpected weight change  HENT: Negative  Respiratory: Negative  Endocrine: Positive for polydipsia and polyuria  Genitourinary: Positive for frequency  Neurological: Negative  Hematological: Negative  Objective:      /72 (BP Location: Right arm, Patient Position: Sitting)   Pulse 101   Temp (!) 97 3 °F (36 3 °C) (Temporal)   Resp 16   Ht 5' 9" (1 753 m)   Wt 114 kg (252 lb)   SpO2 99%   BMI 37 21 kg/m²          Physical Exam  Constitutional:       Appearance: He is obese  HENT:      Head: Normocephalic and atraumatic  Right Ear: External ear normal       Left Ear: External ear normal       Nose: Nose normal    Pulmonary:      Effort: Pulmonary effort is normal    Musculoskeletal:         General: Normal range of motion  Neurological:      General: No focal deficit present  Mental Status: He is alert and oriented to person, place, and time  Mental status is at baseline  Psychiatric:         Mood and Affect: Mood normal          Behavior: Behavior normal          Thought Content:  Thought content normal

## 2022-03-09 NOTE — ASSESSMENT & PLAN NOTE
Lab Results   Component Value Date    HGBA1C >14 0 (H) 03/08/2022   It is possible that Marley Sacks is still dealing with an element of SIHG (steroid induced hyperglycemia)-however, he's been off of steroids since Jan and A1c yesterday was what it was  Marley Sacks has been obese for awhile, and the question becomes is some of this insulin resistance, diet related, or even an autoimmune process brought on by the covid he had in Dec-in any event, with the A1c being >14%,and with him being symptomatic, we have to treat it  We talked at length about dietary changes-he is interested in the free nutrition program over at [facility] where they can teach him how to eat, carb count, etc  has to cut back on sugars, carbs in his diet  We are going to try and start some insulin as well   we had some Tresiba samples and actually ended up giving him 12 units and showed him how to use the pen, pen needles etc-we'll try Tresiba 12 units daily and advised if sugars are running high they may need to increase by 2 units per day  I am also going to add some metformin hopefully as a sensitizer to take with his largest meal of the day  Went over the side effects and benefits of metformin  Need to order routine diabetic labs to be done before the next visit  We are also going to try to get him a continuous glucose monitor, provided insurance covers it

## 2022-03-10 ENCOUNTER — HOSPITAL ENCOUNTER (EMERGENCY)
Facility: HOSPITAL | Age: 52
End: 2022-03-10
Attending: INTERNAL MEDICINE
Payer: COMMERCIAL

## 2022-03-10 ENCOUNTER — APPOINTMENT (EMERGENCY)
Dept: RADIOLOGY | Facility: HOSPITAL | Age: 52
End: 2022-03-10
Payer: COMMERCIAL

## 2022-03-10 ENCOUNTER — HOSPITAL ENCOUNTER (INPATIENT)
Facility: HOSPITAL | Age: 52
LOS: 2 days | Discharge: HOME | DRG: 638 | End: 2022-03-12
Attending: ANESTHESIOLOGY | Admitting: INTERNAL MEDICINE
Payer: COMMERCIAL

## 2022-03-10 VITALS
HEIGHT: 69 IN | HEART RATE: 103 BPM | SYSTOLIC BLOOD PRESSURE: 108 MMHG | DIASTOLIC BLOOD PRESSURE: 80 MMHG | TEMPERATURE: 98.7 F | OXYGEN SATURATION: 97 % | BODY MASS INDEX: 37.18 KG/M2 | WEIGHT: 251 LBS | RESPIRATION RATE: 18 BRPM

## 2022-03-10 DIAGNOSIS — T38.0X5A STEROID-INDUCED DIABETES MELLITUS, INITIAL ENCOUNTER (HCC): Primary | ICD-10-CM

## 2022-03-10 DIAGNOSIS — E11.9 TYPE 2 DIABETES MELLITUS WITHOUT COMPLICATION, UNSPECIFIED WHETHER LONG TERM INSULIN USE (HCC): Primary | ICD-10-CM

## 2022-03-10 DIAGNOSIS — T38.0X5A STEROID-INDUCED DIABETES MELLITUS, INITIAL ENCOUNTER (HCC): ICD-10-CM

## 2022-03-10 DIAGNOSIS — E09.9 STEROID-INDUCED DIABETES MELLITUS, INITIAL ENCOUNTER (HCC): ICD-10-CM

## 2022-03-10 DIAGNOSIS — E09.9 STEROID-INDUCED DIABETES MELLITUS, INITIAL ENCOUNTER (HCC): Primary | ICD-10-CM

## 2022-03-10 DIAGNOSIS — E08.9 DIABETES MELLITUS DUE TO UNDERLYING CONDITION WITHOUT COMPLICATION, UNSPECIFIED WHETHER LONG TERM INSULIN USE (HCC): ICD-10-CM

## 2022-03-10 DIAGNOSIS — E11.10 DIABETIC KETOACIDOSIS WITHOUT COMA ASSOCIATED WITH TYPE 2 DIABETES MELLITUS (HCC): Primary | ICD-10-CM

## 2022-03-10 LAB
ALBUMIN SERPL BCP-MCNC: 4.1 G/DL (ref 3.5–5)
ALP SERPL-CCNC: 129 U/L (ref 34–104)
ALT SERPL W P-5'-P-CCNC: 18 U/L (ref 7–52)
ANION GAP SERPL CALCULATED.3IONS-SCNC: 18 MMOL/L (ref 4–13)
ANION GAP SERPL CALCULATED.3IONS-SCNC: 7 MMOL/L (ref 4–13)
AST SERPL W P-5'-P-CCNC: 13 U/L (ref 13–39)
BASE EX.OXY STD BLDV CALC-SCNC: 83.6 % (ref 60–80)
BASE EX.OXY STD BLDV CALC-SCNC: 85.7 % (ref 60–80)
BASE EXCESS BLDV CALC-SCNC: -0.6 MMOL/L
BASE EXCESS BLDV CALC-SCNC: -4.1 MMOL/L
BASOPHILS # BLD AUTO: 0.02 THOUSANDS/ΜL (ref 0–0.1)
BASOPHILS NFR BLD AUTO: 0 % (ref 0–1)
BETA-HYDROXYBUTYRATE: 1.9 MMOL/L
BILIRUB SERPL-MCNC: 0.63 MG/DL (ref 0.2–1)
BILIRUB UR QL STRIP: NEGATIVE
BUN SERPL-MCNC: 12 MG/DL (ref 5–25)
BUN SERPL-MCNC: 19 MG/DL (ref 5–25)
CALCIUM SERPL-MCNC: 8.8 MG/DL (ref 8.3–10.1)
CALCIUM SERPL-MCNC: 9.7 MG/DL (ref 8.4–10.2)
CHLORIDE SERPL-SCNC: 108 MMOL/L (ref 100–108)
CHLORIDE SERPL-SCNC: 88 MMOL/L (ref 96–108)
CHOLEST SERPL-MCNC: 223 MG/DL
CLARITY UR: CLEAR
CO2 SERPL-SCNC: 20 MMOL/L (ref 21–32)
CO2 SERPL-SCNC: 24 MMOL/L (ref 21–32)
COLOR UR: YELLOW
CREAT SERPL-MCNC: 0.98 MG/DL (ref 0.6–1.3)
CREAT SERPL-MCNC: 1.22 MG/DL (ref 0.6–1.3)
EOSINOPHIL # BLD AUTO: 0.08 THOUSAND/ΜL (ref 0–0.61)
EOSINOPHIL NFR BLD AUTO: 2 % (ref 0–6)
ERYTHROCYTE [DISTWIDTH] IN BLOOD BY AUTOMATED COUNT: 13.8 % (ref 11.6–15.1)
GFR SERPL CREATININE-BSD FRML MDRD: 69 ML/MIN/1.73SQ M
GFR SERPL CREATININE-BSD FRML MDRD: 90 ML/MIN/1.73SQ M
GLUCOSE SERPL-MCNC: 139 MG/DL (ref 65–140)
GLUCOSE SERPL-MCNC: 140 MG/DL (ref 65–140)
GLUCOSE SERPL-MCNC: 150 MG/DL (ref 65–140)
GLUCOSE SERPL-MCNC: 160 MG/DL (ref 65–140)
GLUCOSE SERPL-MCNC: 160 MG/DL (ref 65–140)
GLUCOSE SERPL-MCNC: 218 MG/DL (ref 65–140)
GLUCOSE SERPL-MCNC: 367 MG/DL (ref 65–140)
GLUCOSE SERPL-MCNC: 746 MG/DL (ref 65–140)
GLUCOSE SERPL-MCNC: >500 MG/DL (ref 65–140)
GLUCOSE UR STRIP-MCNC: ABNORMAL MG/DL
HCO3 BLDV-SCNC: 20.5 MMOL/L (ref 24–30)
HCO3 BLDV-SCNC: 23.5 MMOL/L (ref 24–30)
HCT VFR BLD AUTO: 47.3 % (ref 36.5–49.3)
HDLC SERPL-MCNC: 39 MG/DL
HGB BLD-MCNC: 16.6 G/DL (ref 12–17)
HGB UR QL STRIP.AUTO: NEGATIVE
IMM GRANULOCYTES # BLD AUTO: 0.02 THOUSAND/UL (ref 0–0.2)
IMM GRANULOCYTES NFR BLD AUTO: 0 % (ref 0–2)
KETONES UR STRIP-MCNC: ABNORMAL MG/DL
LACTATE SERPL-SCNC: 1.6 MMOL/L (ref 0.5–2)
LDLC SERPL CALC-MCNC: 150 MG/DL (ref 0–100)
LEUKOCYTE ESTERASE UR QL STRIP: NEGATIVE
LYMPHOCYTES # BLD AUTO: 1.55 THOUSANDS/ΜL (ref 0.6–4.47)
LYMPHOCYTES NFR BLD AUTO: 34 % (ref 14–44)
MAGNESIUM SERPL-MCNC: 1.7 MG/DL (ref 1.6–2.6)
MCH RBC QN AUTO: 27 PG (ref 26.8–34.3)
MCHC RBC AUTO-ENTMCNC: 35.1 G/DL (ref 31.4–37.4)
MCV RBC AUTO: 77 FL (ref 82–98)
MONOCYTES # BLD AUTO: 0.6 THOUSAND/ΜL (ref 0.17–1.22)
MONOCYTES NFR BLD AUTO: 13 % (ref 4–12)
NEUTROPHILS # BLD AUTO: 2.26 THOUSANDS/ΜL (ref 1.85–7.62)
NEUTS SEG NFR BLD AUTO: 51 % (ref 43–75)
NITRITE UR QL STRIP: NEGATIVE
NONHDLC SERPL-MCNC: 184 MG/DL
NRBC BLD AUTO-RTO: 0 /100 WBCS
O2 CT BLDV-SCNC: 18.8 ML/DL
O2 CT BLDV-SCNC: 19 ML/DL
PCO2 BLDV: 36.5 MM HG (ref 42–50)
PCO2 BLDV: 37.3 MM HG (ref 42–50)
PH BLDV: 7.36 [PH] (ref 7.3–7.4)
PH BLDV: 7.37 [PH] (ref 7.3–7.4)
PH BLDV: 7.42 [PH] (ref 7.3–7.4)
PH UR STRIP.AUTO: 6 [PH]
PHOSPHATE SERPL-MCNC: 2.9 MG/DL (ref 2.7–4.5)
PLATELET # BLD AUTO: 233 THOUSANDS/UL (ref 149–390)
PMV BLD AUTO: 11.9 FL (ref 8.9–12.7)
PO2 BLDV: 51.7 MM HG (ref 35–45)
PO2 BLDV: 53.9 MM HG (ref 35–45)
POTASSIUM SERPL-SCNC: 3.1 MMOL/L (ref 3.5–5.3)
POTASSIUM SERPL-SCNC: 3.8 MMOL/L (ref 3.5–5.3)
PROT SERPL-MCNC: 7.6 G/DL (ref 6.4–8.4)
PROT UR STRIP-MCNC: NEGATIVE MG/DL
RBC # BLD AUTO: 6.15 MILLION/UL (ref 3.88–5.62)
SODIUM SERPL-SCNC: 126 MMOL/L (ref 135–147)
SODIUM SERPL-SCNC: 139 MMOL/L (ref 136–145)
SP GR UR STRIP.AUTO: <=1.005 (ref 1–1.03)
TRIGL SERPL-MCNC: 169 MG/DL
UROBILINOGEN UR QL STRIP.AUTO: 0.2 E.U./DL
WBC # BLD AUTO: 4.53 THOUSAND/UL (ref 4.31–10.16)

## 2022-03-10 PROCEDURE — 71045 X-RAY EXAM CHEST 1 VIEW: CPT

## 2022-03-10 PROCEDURE — 83605 ASSAY OF LACTIC ACID: CPT | Performed by: STUDENT IN AN ORGANIZED HEALTH CARE EDUCATION/TRAINING PROGRAM

## 2022-03-10 PROCEDURE — 82805 BLOOD GASES W/O2 SATURATION: CPT | Performed by: STUDENT IN AN ORGANIZED HEALTH CARE EDUCATION/TRAINING PROGRAM

## 2022-03-10 PROCEDURE — 83735 ASSAY OF MAGNESIUM: CPT | Performed by: STUDENT IN AN ORGANIZED HEALTH CARE EDUCATION/TRAINING PROGRAM

## 2022-03-10 PROCEDURE — 84100 ASSAY OF PHOSPHORUS: CPT | Performed by: STUDENT IN AN ORGANIZED HEALTH CARE EDUCATION/TRAINING PROGRAM

## 2022-03-10 PROCEDURE — 82010 KETONE BODYS QUAN: CPT | Performed by: INTERNAL MEDICINE

## 2022-03-10 PROCEDURE — 96372 THER/PROPH/DIAG INJ SC/IM: CPT

## 2022-03-10 PROCEDURE — 99223 1ST HOSP IP/OBS HIGH 75: CPT | Performed by: INTERNAL MEDICINE

## 2022-03-10 PROCEDURE — 80061 LIPID PANEL: CPT | Performed by: STUDENT IN AN ORGANIZED HEALTH CARE EDUCATION/TRAINING PROGRAM

## 2022-03-10 PROCEDURE — 82948 REAGENT STRIP/BLOOD GLUCOSE: CPT

## 2022-03-10 PROCEDURE — 99291 CRITICAL CARE FIRST HOUR: CPT | Performed by: INTERNAL MEDICINE

## 2022-03-10 PROCEDURE — 80048 BASIC METABOLIC PNL TOTAL CA: CPT | Performed by: STUDENT IN AN ORGANIZED HEALTH CARE EDUCATION/TRAINING PROGRAM

## 2022-03-10 PROCEDURE — 82800 BLOOD PH: CPT | Performed by: INTERNAL MEDICINE

## 2022-03-10 PROCEDURE — 80053 COMPREHEN METABOLIC PANEL: CPT | Performed by: INTERNAL MEDICINE

## 2022-03-10 PROCEDURE — 96361 HYDRATE IV INFUSION ADD-ON: CPT

## 2022-03-10 PROCEDURE — 36415 COLL VENOUS BLD VENIPUNCTURE: CPT | Performed by: INTERNAL MEDICINE

## 2022-03-10 PROCEDURE — 81003 URINALYSIS AUTO W/O SCOPE: CPT | Performed by: INTERNAL MEDICINE

## 2022-03-10 PROCEDURE — 96365 THER/PROPH/DIAG IV INF INIT: CPT

## 2022-03-10 PROCEDURE — 85025 COMPLETE CBC W/AUTO DIFF WBC: CPT | Performed by: INTERNAL MEDICINE

## 2022-03-10 PROCEDURE — 96366 THER/PROPH/DIAG IV INF ADDON: CPT

## 2022-03-10 PROCEDURE — 99285 EMERGENCY DEPT VISIT HI MDM: CPT

## 2022-03-10 PROCEDURE — 82805 BLOOD GASES W/O2 SATURATION: CPT | Performed by: INTERNAL MEDICINE

## 2022-03-10 RX ORDER — AMLODIPINE BESYLATE 5 MG/1
5 TABLET ORAL DAILY
Status: DISCONTINUED | OUTPATIENT
Start: 2022-03-11 | End: 2022-03-12 | Stop reason: HOSPADM

## 2022-03-10 RX ORDER — INSULIN DEGLUDEC INJECTION 100 U/ML
18 INJECTION, SOLUTION SUBCUTANEOUS DAILY
Qty: 15 ML | Refills: 5 | Status: ON HOLD | OUTPATIENT
Start: 2022-03-10 | End: 2022-03-12 | Stop reason: SDUPTHER

## 2022-03-10 RX ORDER — DEXTROSE AND SODIUM CHLORIDE 5; .45 G/100ML; G/100ML
250 INJECTION, SOLUTION INTRAVENOUS CONTINUOUS
Status: DISCONTINUED | OUTPATIENT
Start: 2022-03-10 | End: 2022-03-11

## 2022-03-10 RX ORDER — POLYETHYLENE GLYCOL 3350 17 G/17G
17 POWDER, FOR SOLUTION ORAL DAILY
Status: DISCONTINUED | OUTPATIENT
Start: 2022-03-11 | End: 2022-03-11

## 2022-03-10 RX ORDER — SODIUM CHLORIDE 9 MG/ML
3 INJECTION INTRAVENOUS
Status: DISCONTINUED | OUTPATIENT
Start: 2022-03-10 | End: 2022-03-10

## 2022-03-10 RX ORDER — LANOLIN ALCOHOL/MO/W.PET/CERES
6 CREAM (GRAM) TOPICAL
Status: DISCONTINUED | OUTPATIENT
Start: 2022-03-10 | End: 2022-03-12 | Stop reason: HOSPADM

## 2022-03-10 RX ORDER — AMOXICILLIN 250 MG
2 CAPSULE ORAL 2 TIMES DAILY
Status: DISCONTINUED | OUTPATIENT
Start: 2022-03-10 | End: 2022-03-11

## 2022-03-10 RX ORDER — POTASSIUM CHLORIDE 14.9 MG/ML
20 INJECTION INTRAVENOUS
Status: DISPENSED | OUTPATIENT
Start: 2022-03-10 | End: 2022-03-10

## 2022-03-10 RX ORDER — PANTOPRAZOLE SODIUM 40 MG/1
40 TABLET, DELAYED RELEASE ORAL
Status: DISCONTINUED | OUTPATIENT
Start: 2022-03-11 | End: 2022-03-11

## 2022-03-10 RX ORDER — SODIUM CHLORIDE 9 MG/ML
250 INJECTION, SOLUTION INTRAVENOUS CONTINUOUS
Status: DISCONTINUED | OUTPATIENT
Start: 2022-03-10 | End: 2022-03-10 | Stop reason: HOSPADM

## 2022-03-10 RX ORDER — SODIUM CHLORIDE, SODIUM GLUCONATE, SODIUM ACETATE, POTASSIUM CHLORIDE, MAGNESIUM CHLORIDE, SODIUM PHOSPHATE, DIBASIC, AND POTASSIUM PHOSPHATE .53; .5; .37; .037; .03; .012; .00082 G/100ML; G/100ML; G/100ML; G/100ML; G/100ML; G/100ML; G/100ML
250 INJECTION, SOLUTION INTRAVENOUS CONTINUOUS
Status: DISCONTINUED | OUTPATIENT
Start: 2022-03-10 | End: 2022-03-10

## 2022-03-10 RX ADMIN — SODIUM CHLORIDE 250 ML/HR: 0.9 INJECTION, SOLUTION INTRAVENOUS at 16:32

## 2022-03-10 RX ADMIN — POTASSIUM CHLORIDE 20 MEQ: 14.9 INJECTION, SOLUTION INTRAVENOUS at 20:37

## 2022-03-10 RX ADMIN — DEXTROSE AND SODIUM CHLORIDE 250 ML/HR: 5; .45 INJECTION, SOLUTION INTRAVENOUS at 21:59

## 2022-03-10 RX ADMIN — Medication 6 MG: at 22:15

## 2022-03-10 RX ADMIN — DEXTROSE AND SODIUM CHLORIDE 250 ML/HR: 5; .45 INJECTION, SOLUTION INTRAVENOUS at 17:51

## 2022-03-10 RX ADMIN — INSULIN HUMAN 10 UNITS: 100 INJECTION, SOLUTION PARENTERAL at 15:00

## 2022-03-10 RX ADMIN — SODIUM CHLORIDE 11.4 UNITS/HR: 9 INJECTION, SOLUTION INTRAVENOUS at 17:51

## 2022-03-10 RX ADMIN — SODIUM CHLORIDE 1000 ML: 0.9 INJECTION, SOLUTION INTRAVENOUS at 13:41

## 2022-03-10 RX ADMIN — SODIUM CHLORIDE 1000 ML: 0.9 INJECTION, SOLUTION INTRAVENOUS at 15:11

## 2022-03-10 RX ADMIN — SODIUM CHLORIDE 8 UNITS/HR: 9 INJECTION, SOLUTION INTRAVENOUS at 15:13

## 2022-03-10 NOTE — EMTALA/ACUTE CARE TRANSFER
[facility] EMERGENCY DEPARTMENT  [address] [place] [address]  Dept: [phone]      EMTALA TRANSFER CONSENT    NAME Whit Bean 47                                          1970                              MRN [id number]    I have been informed of my rights regarding examination, treatment, and transfer   by Dr Jolene Zee MD    Benefits: Specialized equipment and/or services available at the receiving facility (Include comment)________________________    Risks: Potential for delay in receiving treatment,Potential deterioration of medical condition,Loss of IV,Increased discomfort during transfer      Transfer Request   I acknowledge that my medical condition has been evaluated and explained to me by the emergency department physician or other qualified medical person and/or my attending physician who has recommended and offered to me further medical examination and treatment  I understand the Hospital's obligation with respect to the treatment and stabilization of my emergency medical condition  I nevertheless request to be transferred  I release the Hospital, the doctor, and any other persons caring for me from all responsibility or liability for any injury or ill effects that may result from my transfer and agree to accept all responsibility for the consequences of my choice to transfer, rather than receive stabilizing treatment at the Hospital  I understand that because the transfer is my request, my insurance may not provide reimbursement for the services  The Hospital will assist and direct me and my family in how to make arrangements for transfer, but the hospital is not liable for any fees charged by the transport service  In spite of this understanding, I refuse to consent to further medical examination and treatment which has been offered to me, and request transfer to [address], [address] : [place]   I authorize the performance of emergency medical procedures and treatments upon me in both transit and upon arrival at the receiving facility  Additionally, I authorize the release of any and all medical records to the receiving facility and request they be transported with me, if possible  I authorize the performance of emergency medical procedures and treatments upon me in both transit and upon arrival at the receiving facility  Additionally, I authorize the release of any and all medical records to the receiving facility and request they be transported with me, if possible  I understand that the safest mode of transportation during a medical emergency is an ambulance and that the Hospital advocates the use of this mode of transport  Risks of traveling to the receiving facility by car, including absence of medical control, life sustaining equipment, such as oxygen, and medical personnel has been explained to me and I fully understand them  (GEOFF CORRECT BOX BELOW)  [  ]  I consent to the stated transfer and to be transported by ambulance/helicopter  [  ]  I consent to the stated transfer, but refuse transportation by ambulance and accept full responsibility for my transportation by car  I understand the risks of non-ambulance transfers and I exonerate the Hospital and its staff from any deterioration in my condition that results from this refusal     X___________________________________________    DATE  03/10/22  TIME________  Signature of patient or legally responsible individual signing on patient behalf           RELATIONSHIP TO PATIENT_________________________          Provider Certification    NAME Kodak Hyatt                                           1970                              MRN [id number]    A medical screening exam was performed on the above named patient    Based on the examination:    Condition Necessitating Transfer The encounter diagnosis was Diabetic ketoacidosis without coma associated with type 2 diabetes mellitus ([facility] [address] )  Patient Condition: The patient has been stabilized such that within reasonable medical probability, no material deterioration of the patient condition or the condition of the unborn child(jazmin) is likely to result from the transfer    Reason for Transfer: Level of Care needed not available at this facility    Transfer Requirements: Facility [employer] available and qualified personnel available for treatment as acknowledged by    · Agreed to accept transfer and to provide appropriate medical treatment as acknowledged by       Dr Julian Rao  · Appropriate medical records of the examination and treatment of the patient are provided at the time of transfer   [address] [facility], [address] _______  · Transfer will be performed by qualified personnel from    and appropriate transfer equipment as required, including the use of necessary and appropriate life support measures      Provider Certification: I have examined the patient and explained the following risks and benefits of being transferred/refusing transfer to the patient/family:  General risk, such as traffic hazards, adverse weather conditions, rough terrain or turbulence, possible failure of equipment (including vehicle or aircraft), or consequences of actions of persons outside the control of the transport personnel,Unanticipated needs of medical equipment and personnel during transport,Risk of worsening condition,The possibility of a transport vehicle being unavailable      Based on these reasonable risks and benefits to the patient and/or the unborn child(jazmin), and based upon the information available at the time of the patients examination, I certify that the medical benefits reasonably to be expected from the provision of appropriate medical treatments at another medical facility outweigh the increasing risks, if any, to the individuals medical condition, and in the case of labor to the unborn child, from effecting the transfer      X____________________________________________ DATE 03/10/22        TIME_______      ORIGINAL - SEND TO MEDICAL RECORDS   COPY - SEND WITH PATIENT DURING TRANSFER

## 2022-03-10 NOTE — ED PROVIDER NOTES
History  Chief Complaint   Patient presents with    Hyperglycemia - Symptomatic     Patient had BW performed at pulmonary office yesterday and BS tested over 500 and HgA1C was 14  Patient exhibits frequent urination and excessive thirst      This is a 54years old came for having hyperglycemia  Patient received a call from his PMD to go to the ER  Patient found to have hyperglycemia in the last 2 days  Patient PMD started him on Tresiba 15 units, this a m  and yesterday he received metformin 500 mg  According to the wife every time she check his blood sugar comes up high but no reading  On the arrival the fingerstick came back more than 500  Patient has blood work shows hemoglobin A1c is 14  Patient has polyuria polydipsia  Patient has his brother who passed away was diabetic  Patient has COVID-19 on December and he was admitted to the hospital for 2 weeks and after discharge patient was started on steroids  Patient finish or his steroids on January 15  Currently patient takes no steroids  Patient denies any chest pain shortness of breath  Patient has pulse ox 99% on room air  Patient has no fever  Patient has no abdominal pain  Prior to Admission Medications   Prescriptions Last Dose Informant Patient Reported? Taking?    Blood Glucose Monitoring Suppl (OneTouch Verio) w/Device KIT 3/10/2022 at Unknown time  No Yes   Sig: Use 2 times a day to check blood sugar   Lancets (onetouch ultrasoft) lancets   No No   Sig: Use 2 times a day to check blood sugar   amLODIPine (NORVASC) 5 mg tablet 3/10/2022 at Unknown time  No Yes   Sig: TAKE 1 TABLET BY MOUTH EVERY DAY   glucose blood (OneTouch Verio) test strip 3/10/2022 at Unknown time  No Yes   Sig: Use 2 times a day to check blood sugar   insulin degludec Nely Howard FlexTouch) 100 units/mL injection pen 3/10/2022 at Unknown time  No Yes   Sig: Inject 18 Units under the skin daily   metFORMIN (GLUCOPHAGE) 500 mg tablet 3/10/2022 at Unknown time  No Yes Sig: TAKE 1 TABLET PO DAILY WITH LARGEST MEAL      Facility-Administered Medications: None       Past Medical History:   Diagnosis Date    Aneurysm of thoracic aorta (HCC)     Cardiomegaly     HBP (high blood pressure)     Hyperglycemia     Lab test positive for detection of COVID-19 virus 12/12/2021    Low back pain     Meralgia paresthetica, right lower limb     meralgia paresthetica of right leg    Obesity     Pneumonia due to COVID-19 virus 12/16/2021    Bilateral COVID 19 pneumonia    Seasonal allergies     Vitamin D deficiency     Wears glasses        Past Surgical History:   Procedure Laterality Date    NO PAST SURGERIES         Family History   Problem Relation Age of Onset    Breast cancer Sister     Diabetes Brother     Blindness Mother     No Known Problems Father     Sickle cell trait Son     Stroke Maternal Aunt     Down syndrome Sister      I have reviewed and agree with the history as documented  E-Cigarette/Vaping    E-Cigarette Use Never User      E-Cigarette/Vaping Substances    Nicotine No     THC No     CBD No     Flavoring No     Other No     Unknown No      Social History     Tobacco Use    Smoking status: Never Smoker    Smokeless tobacco: Never Used   Vaping Use    Vaping Use: Never used   Substance Use Topics    Alcohol use: Yes     Comment: social    Drug use: Never       Review of Systems   Constitutional: Negative for diaphoresis, fatigue and fever  HENT: Negative for congestion, dental problem, sinus pain, sneezing, sore throat, tinnitus and trouble swallowing  Eyes: Negative for photophobia and visual disturbance  Respiratory: Negative for cough, chest tightness and shortness of breath  Cardiovascular: Negative for chest pain, palpitations and leg swelling  Gastrointestinal: Negative for abdominal pain, diarrhea, nausea and vomiting  Endocrine: Positive for polydipsia, polyphagia and polyuria     Genitourinary: Negative for decreased urine volume, difficulty urinating, dysuria, flank pain, hematuria and urgency  Musculoskeletal: Negative for arthralgias, back pain, gait problem, neck pain and neck stiffness  Skin: Negative for color change, pallor and rash  Neurological: Negative for dizziness, light-headedness, numbness and headaches  Hematological: Negative for adenopathy  Does not bruise/bleed easily  Psychiatric/Behavioral: Negative for agitation and behavioral problems  Physical Exam  Physical Exam  Vitals and nursing note reviewed  Constitutional:       General: He is not in acute distress  Appearance: He is well-developed  He is obese  He is not ill-appearing, toxic-appearing or diaphoretic  HENT:      Head: Normocephalic and atraumatic  Right Ear: Tympanic membrane, ear canal and external ear normal       Left Ear: Tympanic membrane, ear canal and external ear normal       Nose: Nose normal  No congestion or rhinorrhea  Mouth/Throat:      Pharynx: Oropharynx is clear  No oropharyngeal exudate or posterior oropharyngeal erythema  Eyes:      General: No scleral icterus  Right eye: No discharge  Left eye: No discharge  Extraocular Movements: Extraocular movements intact  Pupils: Pupils are equal, round, and reactive to light  Neck:      Vascular: No carotid bruit  Cardiovascular:      Rate and Rhythm: Normal rate and regular rhythm  Pulses: Normal pulses  Heart sounds: Normal heart sounds  No murmur heard  No friction rub  No gallop  Pulmonary:      Effort: Pulmonary effort is normal  No respiratory distress  Breath sounds: Normal breath sounds  No wheezing or rhonchi  Chest:      Chest wall: No tenderness  Abdominal:      General: Bowel sounds are normal  There is no distension  Palpations: Abdomen is soft  There is no mass  Tenderness: There is no abdominal tenderness   There is no right CVA tenderness, left CVA tenderness, guarding or rebound  Musculoskeletal:         General: No swelling, tenderness, deformity or signs of injury  Normal range of motion  Cervical back: Normal range of motion and neck supple  No rigidity or tenderness  Right lower leg: No edema  Left lower leg: No edema  Lymphadenopathy:      Cervical: No cervical adenopathy  Skin:     General: Skin is warm and dry  Capillary Refill: Capillary refill takes less than 2 seconds  Coloration: Skin is not jaundiced or pale  Findings: No bruising, erythema, lesion or rash  Neurological:      Mental Status: He is alert and oriented to person, place, and time     Psychiatric:         Mood and Affect: Mood normal          Behavior: Behavior normal          Vital Signs  ED Triage Vitals [03/10/22 1315]   Temperature Pulse Respirations Blood Pressure SpO2   98 7 °F (37 1 °C) 61 20 120/93 99 %      Temp Source Heart Rate Source Patient Position - Orthostatic VS BP Location FiO2 (%)   Oral Monitor Sitting Left arm --      Pain Score       No Pain           Vitals:    03/10/22 1315 03/10/22 1516   BP: 120/93 108/80   Pulse: 61 103   Patient Position - Orthostatic VS: Sitting Lying         Visual Acuity      ED Medications  Medications   sodium chloride 0 9 % bolus 1,000 mL (0 mL Intravenous Stopped 3/10/22 1511)   sodium chloride 0 9 % bolus 1,000 mL (0 mL Intravenous Stopped 3/10/22 1632)   insulin regular (HumuLIN R,NovoLIN R) injection 10 Units (10 Units Subcutaneous Given 3/10/22 1500)       Diagnostic Studies  Results Reviewed     Procedure Component Value Units Date/Time    Fingerstick Glucose (POCT) [[id number]]  (Abnormal) Collected: 03/10/22 1620    Lab Status: Final result Updated: 03/10/22 1626     POC Glucose 367 mg/dl     COVID/FLU [[id number]]     Lab Status: No result Specimen: Nares from Nose     Blood gas, venous [[id number]]  (Abnormal) Collected: 03/10/22 1445    Lab Status: Final result Specimen: Blood from Arm, Right Updated: 03/10/22 3929 pH, Andi 7 368     pCO2, Andi 36 5 mm Hg      pO2, Andi 51 7 mm Hg      HCO3, Andi 20 5 mmol/L      Base Excess, Andi -4 1 mmol/L      O2 Content, Andi 19 0 ml/dL      O2 HGB, VENOUS 83 6 %     Comprehensive metabolic panel [[id number]]  (Abnormal) Collected: 03/10/22 1337    Lab Status: Final result Specimen: Blood from Arm, Left Updated: 03/10/22 1420     Sodium 126 mmol/L      Potassium 3 8 mmol/L      Chloride 88 mmol/L      CO2 20 mmol/L      ANION GAP 18 mmol/L      BUN 19 mg/dL      Creatinine 1 22 mg/dL      Glucose 746 mg/dL      Calcium 9 7 mg/dL      AST 13 U/L      ALT 18 U/L      Alkaline Phosphatase 129 U/L      Total Protein 7 6 g/dL      Albumin 4 1 g/dL      Total Bilirubin 0 63 mg/dL      eGFR 69 ml/min/1 73sq m     Narrative:      [place] guidelines for Chronic Kidney Disease (CKD):     Stage 1 with normal or high GFR (GFR > 90 mL/min/1 73 square meters)    Stage 2 Mild CKD (GFR = 60-89 mL/min/1 73 square meters)    Stage 3A Moderate CKD (GFR = 45-59 mL/min/1 73 square meters)    Stage 3B Moderate CKD (GFR = 30-44 mL/min/1 73 square meters)    Stage 4 Severe CKD (GFR = 15-29 mL/min/1 73 square meters)    Stage 5 End Stage CKD (GFR <15 mL/min/1 73 square meters)  Note: GFR calculation is accurate only with a steady state creatinine    Beta Hydroxybutyrate [[id number]]  (Abnormal) Collected: 03/10/22 1337    Lab Status: Final result Specimen: Blood from Arm, Left Updated: 03/10/22 1415     BETA-HYDROXYBUTYRATE 1 9 mmol/L     UA w Reflex to Microscopic w Reflex to Culture [[id number]]  (Abnormal) Collected: 03/10/22 1356    Lab Status: Final result Specimen: Urine, Clean Catch Updated: 03/10/22 1409     Color, UA Yellow     Clarity, UA Clear     Specific Gravity, UA <=1 005     pH, UA 6 0     Leukocytes, UA Negative     Nitrite, UA Negative     Protein, UA Negative mg/dl      Glucose, UA 3+ mg/dl      Ketones, UA 15 (1+) mg/dl      Urobilinogen, UA 0 2 E U /dl Bilirubin, UA Negative     Blood, UA Negative    pH, venous [[id number]]  (Normal) Collected: 03/10/22 1337    Lab Status: Final result Specimen: Blood from Arm, Left Updated: 03/10/22 1353     pH, Andi 7 362    CBC and differential [[id number]]  (Abnormal) Collected: 03/10/22 1337    Lab Status: Final result Specimen: Blood from Arm, Left Updated: 03/10/22 1345     WBC 4 53 Thousand/uL      RBC 6 15 Million/uL      Hemoglobin 16 6 g/dL      Hematocrit 47 3 %      MCV 77 fL      MCH 27 0 pg      MCHC 35 1 g/dL      RDW 13 8 %      MPV 11 9 fL      Platelets 164 Thousands/uL      nRBC 0 /100 WBCs      Neutrophils Relative 51 %      Immat GRANS % 0 %      Lymphocytes Relative 34 %      Monocytes Relative 13 %      Eosinophils Relative 2 %      Basophils Relative 0 %      Neutrophils Absolute 2 26 Thousands/µL      Immature Grans Absolute 0 02 Thousand/uL      Lymphocytes Absolute 1 55 Thousands/µL      Monocytes Absolute 0 60 Thousand/µL      Eosinophils Absolute 0 08 Thousand/µL      Basophils Absolute 0 02 Thousands/µL     Fingerstick Glucose (POCT) [[id number]]  (Abnormal) Collected: 03/10/22 1325    Lab Status: Final result Updated: 03/10/22 1327     POC Glucose >500 mg/dl                  XR chest portable    (Results Pending)              Procedures  ECG 12 Lead Documentation Only    Date/Time: 3/10/2022 2:07 PM  Performed by: Trever Pabon MD  Authorized by: Trever Paobn MD     ECG reviewed by me, the ED Provider: yes    Patient location:  ED and bedside  Previous ECG:     Previous ECG:  Unavailable    Comparison to cardiac monitor: Yes    Interpretation:     Interpretation: abnormal    Rate:     ECG rate:  102    ECG rate assessment: tachycardic    Rhythm:     Rhythm: sinus rhythm    Ectopy:     Ectopy: PAC    QRS:     QRS axis:  Left  Conduction:     Conduction: normal    ST segments:     ST segments:  Normal  T waves:     T waves: normal    Other findings:     Other findings: early repolarization CriticalCare Time  Performed by: Davonte Frankel MD  Authorized by: Davonte Frankel MD     Critical care provider statement:     Critical care time (minutes):  30    Critical care start time:  3/10/2022 2:30 PM    Critical care end time:  3/10/2022 3:00 PM    Critical care time was exclusive of:  Separately billable procedures and treating other patients    Critical care was necessary to treat or prevent imminent or life-threatening deterioration of the following conditions:  Endocrine crisis and dehydration    Critical care was time spent personally by me on the following activities:  Blood draw for specimens, obtaining history from patient or surrogate, development of treatment plan with patient or surrogate, ordering and review of laboratory studies, ordering and performing treatments and interventions, discussions with consultants, ordering and review of radiographic studies, discussions with primary provider, re-evaluation of patient's condition, evaluation of patient's response to treatment, review of old charts and examination of patient             ED Course                               SBIRT 22yo+      Most Recent Value   SBIRT (22 yo +)    In order to provide better care to our patients, we are screening all of our patients for alcohol and drug use  Would it be okay to ask you these screening questions? Yes Filed at: 03/10/2022 1317   Initial Alcohol Screen: US AUDIT-C     1  How often do you have a drink containing alcohol? 2 Filed at: 03/10/2022 1317   2  How many drinks containing alcohol do you have on a typical day you are drinking? 1 Filed at: 03/10/2022 1317   3a  Male UNDER 65: How often do you have five or more drinks on one occasion? 0 Filed at: 03/10/2022 1317   3b  FEMALE Any Age, or MALE 65+: How often do you have 4 or more drinks on one occassion? 0 Filed at: 03/10/2022 1317   Audit-C Score 3 Filed at: 03/10/2022 1317   TRACEY: How many times in the past year have you        Used an illegal drug or used a prescription medication for non-medical reasons? Never Filed at: 03/10/2022 1317                    MDM  Number of Diagnoses or Management Options  Diagnosis management comments: THIS IS A 54YEARS OLD CAME FOR HAVING HYPERGLYCEMIA  Haley Parson Patient was told to come to the ER by his PMD as he has polyuria polydipsia  Patient has hemoglobin A1c came back 14  On the arrival to the ER the reading of blood sugar was high  We found patient having blood sugar 746 with anion gap 18 and beta hydroxy 1 9  Patient has ketone in the urine  Patient has finding consistent with DKA  Patient received 2 L of normal saline and started on insulin drip  The case discussed with critical care doctor [place] [facility]  [place] is going to be transferred there to critical care unit  All question concerns of the patient have been fully answered  Amount and/or Complexity of Data Reviewed  Clinical lab tests: ordered and reviewed  Tests in the radiology section of CPT®: ordered and reviewed    Risk of Complications, Morbidity, and/or Mortality  Presenting problems: high  Diagnostic procedures: high  Management options: high        Disposition  Final diagnoses:   Diabetic ketoacidosis without coma associated with type 2 diabetes mellitus ([facility] [address] )     Time reflects when diagnosis was documented in both MDM as applicable and the Disposition within this note     Time User Action Codes Description Comment    3/10/2022  3:53 PM Evan Birch Add [E11 10] Diabetic ketoacidosis without coma associated with type 2 diabetes mellitus [facility])       ED Disposition     ED Disposition Condition Date/Time Comment    Transfer to [facility] Mar 10, 2022  3:55 PM [address] [facility]  [place] [facility] accepted          MD Documentation      Most Recent Value   Patient Condition The patient has been stabilized such that within reasonable medical probability, no material deterioration of the patient condition or the condition of the unborn child(jazmin) is likely to result from the transfer   Reason for Transfer Level of Care needed not available at this facility   Benefits of Transfer Specialized equipment and/or services available at the receiving facility (Include comment)________________________   Risks of Transfer Potential for delay in receiving treatment, Potential deterioration of medical condition, Loss of IV, Increased discomfort during transfer   Accepting Physician Dr Pau Flores   Accepting Facility Name, [facility]   Sending MD \[facility] [place] [facility]\""   Provider Certification General risk, such as traffic hazards, adverse weather conditions, rough terrain or turbulence, possible failure of equipment (including vehicle or aircraft), or consequences of actions of persons outside the control of the transport personnel, Unanticipated needs of medical equipment and personnel during transport, Risk of worsening condition, The possibility of a transport vehicle being unavailable      RN Documentation      Most [address] [facility] Name, [facility]      Follow-up Information    None         Discharge Medication List as of 3/10/2022  4:53 PM      CONTINUE these medications which have NOT CHANGED    Details   amLODIPine (NORVASC) 5 mg tablet TAKE 1 TABLET BY MOUTH EVERY DAY, Normal      Blood Glucose Monitoring Suppl (OneTouch Verio) w/Device KIT Use 2 times a day to check blood sugar, Normal      glucose blood (OneTouch Verio) test strip Use 2 times a day to check blood sugar, Normal      insulin degludec Radha La FlexTouch) 100 units/mL injection pen Inject 18 Units under the skin daily, Starting Thu 3/10/2022, Normal      metFORMIN (GLUCOPHAGE) 500 mg tablet TAKE 1 TABLET PO DAILY WITH LARGEST MEAL, Normal      Lancets (onetouch ultrasoft) lancets Use 2 times a day to check blood sugar, Normal             No discharge procedures on file      PDMP Review     None          ED Provider  Electronically Signed by           Yosef Hinojosa MD  03/11/22 9965

## 2022-03-10 NOTE — ED NOTES
[facility] [address]   [facility] room 205  Report [id number]   Accepting: Dr Jose C Rose, RN  03/10/22 9711

## 2022-03-10 NOTE — H&P
H&P Exam - Oneil 67  54 y o  male MRN: [id number]  Unit/Bed#: ICCU 205-01 Encounter: [id number]      -------------------------------------------------------------------------------------------------------------  Chief Complaint:  Symptomatic hyperglycemia    History of Present Illness   Brandon Chen  is a 54 y o  male with past medical history of recently diagnosed type 2 diabetes, and hypertension who presents with symptomatic hyperglycemia  The patient reports that his blood glucose checks at home have been returning unreadable levels of elevated glucose  At the urging of his primary care provider via phone call, he presented to the [facility] emergency department  The patient endorses recent polydipsia, polyphagia, polyuria  Initial vitals were within normal limits  Initial blood glucose was 746 with beta hydroxybutyrate of 1 9, hemoglobin A1c > 14 %  His venous pH was 7 37, pCO2 36 5, serum bicarb is 20, anion gap 18, sodium 126 (corrected 136), potassium 3 8, creatinine 1 22 from baseline 1 2-1 5  UA was positive for ketones  ECG demonstrated sinus tachycardia  Insulin drip was started at 8 units/hour, blood glucose decreased from 746 at 13:37 to 367 at 16:20  Insulin drip was decreased to 4 units/hour  He received a 1 L bolus of normal saline and was placed on 250 cc/hour normal saline infusion  He was subsequently transferred to [facility] for ICU care and treatment of his diabetic ketoacidosis  Patient reports he has previously worked as a  but he has been out of work for the past 4 months  He received a recent diagnosis of COVID and completed treatment with steroids in mid January  He has not taken steroids since  He denies significant alcohol use or smoking  Denies any recreational drug use      History obtained from chart review and the patient   -------------------------------------------------------------------------------------------------------------    Assessment & Plan:   -Neuro:   - Analgesia:  P r n  Tylenol  - Sedation - none  RASS goal 0  - Delirium ppx: CAM-ICU, sleep hygiene, melatonin for sleep  - no acute issues  - patient denies any numbness tingling or any other symptoms of neuropathy      CV:   - Hemodynamic support:  None  - Goal normotension map greater than 65  - ECG demonstrates sinus tachycardia  Diagnosis:  Hypertension  Plan:  - continue home amlodipine 5 mg daily for now  - would benefit from transition to ARB or ACE-inhibitor  - would benefit from statin, will check lipid panel       Lung:   - stable on room air  - no acute issues      GI:   - Stress ulcer ppx:  Protonix  - Bowel regimen: MiraLax, Senokot S  - no acute issues    FEN:   - Fluids:  IV normal saline 250 cc/hour  - Electrolytes: trend and replete as needed      - sodium 126 (corrected 136),       - potassium 3 8      - q 6 BMP      - goal K 4-5, replete aggressively  - Nutrition:  NPO sips with meds for now pending return of stable lab work, can likely transition to p o  Diet this evening  :   - creatinine 1 22 from baseline 1 2-1 5  - Trend I&O  - Arteaga not needed      ID:   - WBC:  4 53  - Temp:  Afebrile  - Abx:  None  - Cultures:  None   - Monitor WBC/temp curve  - no acute issues      Heme:   - Hb:  16 6  - DVT ppx:  Lovenox, SCDs   - no acute issues      Endo:   Home regimen as follows  - metformin 5 per mg daily  - insulin degludec 18 units subcutaneously daily  - patient is not taking metformin yet  - patient reports he has only taken insulin for the past 2 days  Diagnosis:  Diabetic ketoacidosis  - A1c greater than 14%  - Initial blood glucose was 746 with   - beta hydroxybutyrate of 1 9, hemoglobin A1c > 14 %    His   - venous pH was 7 37, pCO2 36 5, serum bicarb is 20, anion gap 18,   - sodium 126 (corrected 136), potassium 3 8  - UA positive for ketones  - Insulin drip was started at 8 units/hour  - blood glucose decreased from 746 at 13:37 to 367 at 16:20  - insulin drip subsequently decreased to 4 units/hour  Plan:  - insulin drip DKA protocol  - start d5 1/2NS at 250cc/hr  - continue to monitor BMP for anion gap closure x2  - will monitor insulin requirements on insulin drip over 1 day then transition to subcutaneous regimen based on his insulin requirement  - trend and replete potassium  - will need extensive diabetes education and close outpatient follow-up      Msk/Skin:   - PT/OT  - Turning/repositioning  - no acute issues            Disposition: Admit to Stepdown Level 1  Code Status: Prior  --------------------------------------------------------------------------------------------------------------  Review of Systems   Constitutional: Negative for chills, diaphoresis, fatigue and fever  HENT: Negative for congestion and sore throat  Eyes: Negative for visual disturbance  Respiratory: Negative for cough, shortness of breath and wheezing  Cardiovascular: Negative for chest pain, palpitations and leg swelling  Gastrointestinal: Negative for abdominal pain, constipation, diarrhea, nausea and vomiting  Endocrine: Positive for polydipsia, polyphagia and polyuria  Genitourinary: Negative for dysuria  Musculoskeletal: Negative for arthralgias and myalgias  Skin: Negative for rash and wound  Neurological: Negative for dizziness, weakness, light-headedness, numbness and headaches  Psychiatric/Behavioral: Negative for agitation and confusion  All other systems reviewed and are negative  A 12-point, complete review of systems was reviewed and negative except as stated above     Physical Exam  Vitals and nursing note reviewed  Constitutional:       General: He is not in acute distress  Appearance: Normal appearance  He is well-developed  He is obese  HENT:      Head: Normocephalic and atraumatic        Nose: Nose normal       Mouth/Throat:      Mouth: Mucous membranes are moist       Pharynx: Oropharynx is clear  Eyes:      Extraocular Movements: Extraocular movements intact  Conjunctiva/sclera: Conjunctivae normal       Pupils: Pupils are equal, round, and reactive to light  Cardiovascular:      Rate and Rhythm: Normal rate and regular rhythm  Pulses: Normal pulses  Heart sounds: Normal heart sounds  No murmur heard  Pulmonary:      Effort: Pulmonary effort is normal  No respiratory distress  Breath sounds: Normal breath sounds  No wheezing  Abdominal:      Palpations: Abdomen is soft  Tenderness: There is no abdominal tenderness  There is no guarding  Musculoskeletal:      Cervical back: Neck supple  Right lower leg: No edema  Left lower leg: No edema  Skin:     General: Skin is warm and dry  Capillary Refill: Capillary refill takes less than 2 seconds  Neurological:      General: No focal deficit present  Mental Status: He is alert and oriented to person, place, and time  Psychiatric:         Mood and Affect: Mood normal          Behavior: Behavior normal          Thought Content: Thought content normal        --------------------------------------------------------------------------------------------------------------  Vitals: There were no vitals filed for this visit  Temp  Min: 98 7 °F (37 1 °C)  Max: 98 7 °F (37 1 °C)        There is no height or weight on file to calculate BMI    PAP:      Laboratory and Diagnostics:  Results from last 7 days   Lab Units 03/10/22  1337   WBC Thousand/uL 4 53   HEMOGLOBIN g/dL 16 6   HEMATOCRIT % 47 3   PLATELETS Thousands/uL 233   NEUTROS PCT % 51   MONOS PCT % 13*     Results from last 7 days   Lab Units 03/10/22  1337   SODIUM mmol/L 126*   POTASSIUM mmol/L 3 8   CHLORIDE mmol/L 88*   CO2 mmol/L 20*   ANION GAP mmol/L 18*   BUN mg/dL 19   CREATININE mg/dL 1 22   CALCIUM mg/dL 9 7   GLUCOSE RANDOM mg/dL 746*   ALT U/L 18   AST U/L 13   ALK PHOS U/L 129*   ALBUMIN g/dL 4 1   TOTAL BILIRUBIN mg/dL 0 63                       ABG:    VBG:  Results from last 7 days   Lab Units 03/10/22  1445   PH ZHANNA  7 368   PCO2 ZHANNA mm Hg 36 5*   PO2 ZHANNA mm Hg 51 7*   HCO3 ZHANNA mmol/L 20 5*   BASE EXC ZHANNA mmol/L -4 1           Micro:        EKG:  Sinus tachycardia  Imaging: I have personally reviewed pertinent reports  and I have personally reviewed pertinent films in PACS    Historical Information   Past Medical History:   Diagnosis Date    Aneurysm of thoracic aorta (HCC)     Cardiomegaly     HBP (high blood pressure)     Hyperglycemia     Lab test positive for detection of COVID-19 virus 12/12/2021    Low back pain     Meralgia paresthetica, right lower limb     meralgia paresthetica of right leg    Obesity     Pneumonia due to COVID-19 virus 12/16/2021    Bilateral COVID 19 pneumonia    Seasonal allergies     Vitamin D deficiency     Wears glasses      Past Surgical History:   Procedure Laterality Date    NO PAST SURGERIES       Social History   Social History     Substance and Sexual Activity   Alcohol Use Yes    Comment: social     Social History     Substance and Sexual Activity   Drug Use Never     Social History     Tobacco Use   Smoking Status Never Smoker   Smokeless Tobacco Never Used       Family History:   Family History   Problem Relation Age of Onset    Breast cancer Sister     Diabetes Brother     Blindness Mother     No Known Problems Father     Sickle cell trait Son     Stroke Maternal Aunt     Down syndrome Sister      I have reviewed this patient's family history and commented on sigificant items within the HPI      Medications:  No current facility-administered medications for this encounter       Facility-Administered Medications Ordered in Other Encounters   Medication Dose Route Frequency    insulin regular (HumuLIN R,NovoLIN R) 1 Units/mL in sodium chloride 0 9 % 100 mL infusion  8 Units/hr Intravenous Titrated    sodium chloride 0 9 % infusion  250 mL/hr Intravenous Continuous     Home medications:  Cannot display prior to admission medications because the patient has not been admitted in this contact  Allergies:  No Known Allergies  ------------------------------------------------------------------------------------------------------------  Advance Directive and Living Will:      Power of :    POLST:    ------------------------------------------------------------------------------------------------------------  Anticipated Length of Stay is > 2 midnights    Care Time Delivered:   Upon my evaluation, this patient had a high probability of imminent or life-threatening deterioration due to Diabetic ketoacidosis, which required my direct attention, intervention, and personal management  I have personally provided Twenty minutes (20 to 30) of critical care time, exclusive of procedures, teaching, family meetings, and any prior time recorded by providers other than myself  Cleveland Sage MD  Transitional Year Resident, PGY1      Portions of the record may have been created with voice recognition software  Occasional wrong word or "sound a like" substitutions may have occurred due to the inherent limitations of voice recognition software    Read the chart carefully and recognize, using context, where substitutions have occurred

## 2022-03-10 NOTE — ED NOTES
Dr Taqueria Manrique notified of recent blood sugar result    Instructed by provider to keep insulin gtt at 8units/hr IV     Jackie Fontaine RN  03/10/22 9682

## 2022-03-11 PROBLEM — E87.6 HYPOKALEMIA: Status: ACTIVE | Noted: 2022-03-11

## 2022-03-11 PROBLEM — E11.10 DIABETIC KETOACIDOSIS WITHOUT COMA ASSOCIATED WITH TYPE 2 DIABETES MELLITUS (HCC): Status: RESOLVED | Noted: 2022-03-10 | Resolved: 2022-03-11

## 2022-03-11 PROBLEM — E87.1 HYPONATREMIA: Status: RESOLVED | Noted: 2021-12-24 | Resolved: 2022-03-11

## 2022-03-11 PROBLEM — E78.5 HYPERLIPIDEMIA: Status: ACTIVE | Noted: 2022-03-11

## 2022-03-11 PROBLEM — E11.9 TYPE 2 DIABETES MELLITUS (HCC): Status: ACTIVE | Noted: 2022-03-09

## 2022-03-11 LAB
ANION GAP SERPL CALCULATED.3IONS-SCNC: 8 MMOL/L (ref 4–13)
ANION GAP SERPL CALCULATED.3IONS-SCNC: 9 MMOL/L (ref 4–13)
BASOPHILS # BLD AUTO: 0.02 THOUSANDS/ΜL (ref 0–0.1)
BASOPHILS # BLD AUTO: 0.03 THOUSANDS/ΜL (ref 0–0.1)
BASOPHILS NFR BLD AUTO: 0 % (ref 0–1)
BASOPHILS NFR BLD AUTO: 1 % (ref 0–1)
BUN SERPL-MCNC: 11 MG/DL (ref 5–25)
BUN SERPL-MCNC: 15 MG/DL (ref 5–25)
CALCIUM SERPL-MCNC: 8.2 MG/DL (ref 8.3–10.1)
CALCIUM SERPL-MCNC: 8.8 MG/DL (ref 8.3–10.1)
CHLORIDE SERPL-SCNC: 105 MMOL/L (ref 100–108)
CHLORIDE SERPL-SCNC: 109 MMOL/L (ref 100–108)
CO2 SERPL-SCNC: 22 MMOL/L (ref 21–32)
CO2 SERPL-SCNC: 23 MMOL/L (ref 21–32)
CREAT SERPL-MCNC: 0.88 MG/DL (ref 0.6–1.3)
CREAT SERPL-MCNC: 0.91 MG/DL (ref 0.6–1.3)
EOSINOPHIL # BLD AUTO: 0.14 THOUSAND/ΜL (ref 0–0.61)
EOSINOPHIL # BLD AUTO: 0.16 THOUSAND/ΜL (ref 0–0.61)
EOSINOPHIL NFR BLD AUTO: 3 % (ref 0–6)
EOSINOPHIL NFR BLD AUTO: 3 % (ref 0–6)
ERYTHROCYTE [DISTWIDTH] IN BLOOD BY AUTOMATED COUNT: 14.1 % (ref 11.6–15.1)
ERYTHROCYTE [DISTWIDTH] IN BLOOD BY AUTOMATED COUNT: 14.3 % (ref 11.6–15.1)
GFR SERPL CREATININE-BSD FRML MDRD: 100 ML/MIN/1.73SQ M
GFR SERPL CREATININE-BSD FRML MDRD: 98 ML/MIN/1.73SQ M
GLUCOSE SERPL-MCNC: 138 MG/DL (ref 65–140)
GLUCOSE SERPL-MCNC: 138 MG/DL (ref 65–140)
GLUCOSE SERPL-MCNC: 223 MG/DL (ref 65–140)
GLUCOSE SERPL-MCNC: 227 MG/DL (ref 65–140)
GLUCOSE SERPL-MCNC: 228 MG/DL (ref 65–140)
GLUCOSE SERPL-MCNC: 228 MG/DL (ref 65–140)
GLUCOSE SERPL-MCNC: 289 MG/DL (ref 65–140)
GLUCOSE SERPL-MCNC: 295 MG/DL (ref 65–140)
GLUCOSE SERPL-MCNC: 317 MG/DL (ref 65–140)
HCT VFR BLD AUTO: 38.6 % (ref 36.5–49.3)
HCT VFR BLD AUTO: 40.3 % (ref 36.5–49.3)
HGB BLD-MCNC: 13.6 G/DL (ref 12–17)
HGB BLD-MCNC: 13.7 G/DL (ref 12–17)
IMM GRANULOCYTES # BLD AUTO: 0.01 THOUSAND/UL (ref 0–0.2)
IMM GRANULOCYTES # BLD AUTO: 0.01 THOUSAND/UL (ref 0–0.2)
IMM GRANULOCYTES NFR BLD AUTO: 0 % (ref 0–2)
IMM GRANULOCYTES NFR BLD AUTO: 0 % (ref 0–2)
LYMPHOCYTES # BLD AUTO: 1.63 THOUSANDS/ΜL (ref 0.6–4.47)
LYMPHOCYTES # BLD AUTO: 1.77 THOUSANDS/ΜL (ref 0.6–4.47)
LYMPHOCYTES NFR BLD AUTO: 35 % (ref 14–44)
LYMPHOCYTES NFR BLD AUTO: 36 % (ref 14–44)
MAGNESIUM SERPL-MCNC: 1.6 MG/DL (ref 1.6–2.6)
MCH RBC QN AUTO: 26.4 PG (ref 26.8–34.3)
MCH RBC QN AUTO: 26.9 PG (ref 26.8–34.3)
MCHC RBC AUTO-ENTMCNC: 34 G/DL (ref 31.4–37.4)
MCHC RBC AUTO-ENTMCNC: 35.2 G/DL (ref 31.4–37.4)
MCV RBC AUTO: 76 FL (ref 82–98)
MCV RBC AUTO: 78 FL (ref 82–98)
MONOCYTES # BLD AUTO: 0.57 THOUSAND/ΜL (ref 0.17–1.22)
MONOCYTES # BLD AUTO: 0.6 THOUSAND/ΜL (ref 0.17–1.22)
MONOCYTES NFR BLD AUTO: 12 % (ref 4–12)
MONOCYTES NFR BLD AUTO: 13 % (ref 4–12)
NEUTROPHILS # BLD AUTO: 2.12 THOUSANDS/ΜL (ref 1.85–7.62)
NEUTROPHILS # BLD AUTO: 2.55 THOUSANDS/ΜL (ref 1.85–7.62)
NEUTS SEG NFR BLD AUTO: 48 % (ref 43–75)
NEUTS SEG NFR BLD AUTO: 49 % (ref 43–75)
NRBC BLD AUTO-RTO: 0 /100 WBCS
NRBC BLD AUTO-RTO: 0 /100 WBCS
PHOSPHATE SERPL-MCNC: 4 MG/DL (ref 2.7–4.5)
PLATELET # BLD AUTO: 181 THOUSANDS/UL (ref 149–390)
PLATELET # BLD AUTO: 184 THOUSANDS/UL (ref 149–390)
PMV BLD AUTO: 11.8 FL (ref 8.9–12.7)
PMV BLD AUTO: 12.2 FL (ref 8.9–12.7)
POTASSIUM SERPL-SCNC: 3.2 MMOL/L (ref 3.5–5.3)
POTASSIUM SERPL-SCNC: 3.5 MMOL/L (ref 3.5–5.3)
RBC # BLD AUTO: 5.05 MILLION/UL (ref 3.88–5.62)
RBC # BLD AUTO: 5.19 MILLION/UL (ref 3.88–5.62)
SODIUM SERPL-SCNC: 136 MMOL/L (ref 136–145)
SODIUM SERPL-SCNC: 140 MMOL/L (ref 136–145)
WBC # BLD AUTO: 4.49 THOUSAND/UL (ref 4.31–10.16)
WBC # BLD AUTO: 5.12 THOUSAND/UL (ref 4.31–10.16)

## 2022-03-11 PROCEDURE — 82948 REAGENT STRIP/BLOOD GLUCOSE: CPT

## 2022-03-11 PROCEDURE — 97166 OT EVAL MOD COMPLEX 45 MIN: CPT

## 2022-03-11 PROCEDURE — 99232 SBSQ HOSP IP/OBS MODERATE 35: CPT | Performed by: INTERNAL MEDICINE

## 2022-03-11 PROCEDURE — 83735 ASSAY OF MAGNESIUM: CPT | Performed by: STUDENT IN AN ORGANIZED HEALTH CARE EDUCATION/TRAINING PROGRAM

## 2022-03-11 PROCEDURE — 85025 COMPLETE CBC W/AUTO DIFF WBC: CPT | Performed by: STUDENT IN AN ORGANIZED HEALTH CARE EDUCATION/TRAINING PROGRAM

## 2022-03-11 PROCEDURE — 84100 ASSAY OF PHOSPHORUS: CPT | Performed by: STUDENT IN AN ORGANIZED HEALTH CARE EDUCATION/TRAINING PROGRAM

## 2022-03-11 PROCEDURE — 99222 1ST HOSP IP/OBS MODERATE 55: CPT | Performed by: INTERNAL MEDICINE

## 2022-03-11 PROCEDURE — 80048 BASIC METABOLIC PNL TOTAL CA: CPT | Performed by: STUDENT IN AN ORGANIZED HEALTH CARE EDUCATION/TRAINING PROGRAM

## 2022-03-11 PROCEDURE — 97162 PT EVAL MOD COMPLEX 30 MIN: CPT

## 2022-03-11 RX ORDER — INSULIN GLARGINE 100 [IU]/ML
10 INJECTION, SOLUTION SUBCUTANEOUS ONCE
Status: COMPLETED | OUTPATIENT
Start: 2022-03-11 | End: 2022-03-11

## 2022-03-11 RX ORDER — POTASSIUM CHLORIDE 20 MEQ/1
40 TABLET, EXTENDED RELEASE ORAL ONCE
Status: COMPLETED | OUTPATIENT
Start: 2022-03-11 | End: 2022-03-11

## 2022-03-11 RX ORDER — INSULIN GLARGINE 100 [IU]/ML
30 INJECTION, SOLUTION SUBCUTANEOUS
Status: DISCONTINUED | OUTPATIENT
Start: 2022-03-11 | End: 2022-03-12

## 2022-03-11 RX ORDER — MAGNESIUM SULFATE 1 G/100ML
1 INJECTION INTRAVENOUS ONCE
Status: COMPLETED | OUTPATIENT
Start: 2022-03-11 | End: 2022-03-11

## 2022-03-11 RX ORDER — INSULIN GLARGINE 100 [IU]/ML
15 INJECTION, SOLUTION SUBCUTANEOUS
Status: DISCONTINUED | OUTPATIENT
Start: 2022-03-11 | End: 2022-03-11

## 2022-03-11 RX ADMIN — Medication 6 MG: at 21:43

## 2022-03-11 RX ADMIN — AMLODIPINE BESYLATE 5 MG: 5 TABLET ORAL at 09:48

## 2022-03-11 RX ADMIN — INSULIN GLARGINE 30 UNITS: 100 INJECTION, SOLUTION SUBCUTANEOUS at 21:43

## 2022-03-11 RX ADMIN — POTASSIUM CHLORIDE 40 MEQ: 1500 TABLET, EXTENDED RELEASE ORAL at 06:25

## 2022-03-11 RX ADMIN — INSULIN LISPRO 5 UNITS: 100 INJECTION, SOLUTION INTRAVENOUS; SUBCUTANEOUS at 21:45

## 2022-03-11 RX ADMIN — POTASSIUM & SODIUM PHOSPHATES POWDER PACK 280-160-250 MG 2 PACKET: 280-160-250 PACK at 09:48

## 2022-03-11 RX ADMIN — INSULIN LISPRO 6 UNITS: 100 INJECTION, SOLUTION INTRAVENOUS; SUBCUTANEOUS at 12:44

## 2022-03-11 RX ADMIN — INSULIN LISPRO 10 UNITS: 100 INJECTION, SOLUTION INTRAVENOUS; SUBCUTANEOUS at 17:20

## 2022-03-11 RX ADMIN — MAGNESIUM SULFATE HEPTAHYDRATE 1 G: 1 INJECTION, SOLUTION INTRAVENOUS at 07:55

## 2022-03-11 RX ADMIN — ENOXAPARIN SODIUM 40 MG: 40 INJECTION SUBCUTANEOUS at 09:48

## 2022-03-11 RX ADMIN — INSULIN LISPRO 4 UNITS: 100 INJECTION, SOLUTION INTRAVENOUS; SUBCUTANEOUS at 06:21

## 2022-03-11 RX ADMIN — INSULIN GLARGINE 10 UNITS: 100 INJECTION, SOLUTION SUBCUTANEOUS at 07:55

## 2022-03-11 RX ADMIN — INSULIN LISPRO 6 UNITS: 100 INJECTION, SOLUTION INTRAVENOUS; SUBCUTANEOUS at 17:21

## 2022-03-11 RX ADMIN — PANTOPRAZOLE SODIUM 40 MG: 40 TABLET, DELAYED RELEASE ORAL at 06:04

## 2022-03-11 NOTE — ASSESSMENT & PLAN NOTE
Component Value Units   Cholesterol 223 High   mg/dL   Triglycerides 169 High   mg/dL   HDL, Direct 39 Low  mg/dL   LDL Calculated 150 High   mg/dL   Non-HDL-Chol (CHOL-HDL) 184 mg/dl        10 year ASCVD risk 15 1%  Patient would benefit from starting high-intensity statin therapy

## 2022-03-11 NOTE — ASSESSMENT & PLAN NOTE
Lab Results   Component Value Date    HGBA1C >14 0 (H) 03/08/2022       Recent Labs     03/10/22  2209 03/10/22  2351 03/11/22  0104 03/11/22  0610   POCGLU 160* 139 138 228*       Blood Sugar Average: Last 72 hrs:  (P) 159 [id number]     - A1c greater than 14%  - Initial blood glucose was 746   - initially presenting in DKA, now resolved  - now transitioned from insulin drip to subcutaneous sliding scale insulin  Plan:  - now started sliding scale insulin algorithm 4  - received 10u lantus this am  - trend and replete potassium as needed  - will need extensive diabetes education and close outpatient follow-up  - endocrine consult placed

## 2022-03-11 NOTE — PHYSICAL THERAPY NOTE
PHYSICAL THERAPY EVALUATION     03/11/22 1121   Note Type   Note type Evaluation   Pain Assessment   Pain Assessment Tool 0-10   Pain Score No Pain   Restrictions/Precautions   Weight Bearing Precautions Per Order No   Other Precautions Telemetry; Fall Risk   Home Living   Type of [address] [facility] Two level;Bed/bath upstairs  ([address], full bath on 1st and 2nd floor)   Bathroom Shower/Tub   (1st floor tub/shower, 2nd floor walk-in)   [address]   (Pt denies any DME)   Additional Comments Pt reports no DME use pta   Prior Function   Level of [place] Independent with ADLs and functional mobility   Lives With Spouse  (Works from home, pt reports can assist as needed)   Receives Help From [facility], [address] in the last 6 months 0   Vocational   (Off work since Dec COVID hospitilization, plans to return)   Cognition   Overall Cognitive Status WFL   Attention Within functional limits   Orientation Level Oriented X4   Memory Within functional limits   Following Commands Follows all commands and directions without difficulty   RLE Assessment   RLE Assessment WFL   LLE Assessment   LLE Assessment WFL   Coordination   Movements are Fluid and Coordinated 1   Bed Mobility   Supine to Sit 7  Independent   Transfers   Sit to Stand 7  Independent   Stand to Sit 7  Independent   Ambulation/Elevation   Gait pattern WNL   Gait Assistance 7  Independent   Assistive Device None   Distance 120'   Ambulation/Elevation Additional Comments Pt able to lean over and adjust socks/pants in standing without assistance or LOB   Balance   Static Sitting Good   Dynamic Sitting Good   Static Standing Good   Dynamic Standing Good   Ambulatory Good   Endurance Deficit   Endurance Deficit No   Activity Tolerance   Activity Tolerance Patient tolerated treatment well   Medical Staff Made Aware OT Melody Garcia, [facility]   OT present due to pt medical complexity and unknown assist level prior to eval     Nurse Made Aware RN cleared pt for therapy    Assessment   Prognosis Excellent   Problem List Decreased mobility   Assessment Pt is a 54y o  year old male admitted to [facility] with Diabetic ketoacidosis without coma associated with type 2 diabetes mellitus ([facility] [address] ) on 3/10/2022  PT consulted to assess pt's functional mobility and D/C needs  Order placed for PT eval and tx, w/ up w/ A order  Prior to hospitalization, pt was independent in ADLs, iADLs, and ambulation without an AD  He does report 2 week hospitalization in December with COVID and subsequent impairments but since then reports returning to baseline and was about to return to work  Currently pt is limited by decreased activity tolerance, decreased functional mobility tolerance and fall risk  Upon evaluation, pt demonstrating independence with bed mobility, transfers, and ambulation without an AD  He was able to reach towards the floor to adjust clothing without UE support, LOB, or assistance required  The following objective measures performed on IE also reveal limitations: The patient's AM-PAC Basic Mobility Inpatient Short Form Raw Score is 24  A standardized score greater than 16 suggests the patient may benefit from discharge to home  Please also refer to the recommendation of the Physical Therapist for safe D/C planning  Pt demonstrating unstable/unpredictable clinical presentation due to medical complexity, decreased mobility tolerance, and is at an increased risk for falling  Pt would benefit from continued mobility with staff but no longer requiring skilled IP PT services at this time  From PT/mobility standpoint, recommendation at time of D/C would be no rehabilitation needs pending progress in order to facilitate return to PLOF      Barriers to Discharge None   Goals   Patient Goals To go home   Recommendation   PT Discharge Recommendation No rehabilitation needs   AM-PAC Basic Mobility Inpatient Turning in Bed Without Bedrails 4   Lying on Back to Sitting on Edge of Flat Bed 4   Moving Bed to Chair 4   Standing Up From Chair 4   Walk in Room 4   Climb 3-5 Stairs 4   Basic Mobility Inpatient Raw Score 24   Basic Mobility Standardized Score 57 68   Highest Level Of Mobility   [facility] Goal 8: Walk 250 feet or more   End of Consult   Patient Position at End of Consult Bedside chair; All needs within reach   End of Consult Comments RN aware of and OK with no chair alarm     Sammi Haro, SPT

## 2022-03-11 NOTE — PROGRESS NOTES
Transfer Note - Oneil 67  54 y o  male MRN: [id number]  Unit/Bed#: [facility] 205-01 Encounter: [id number]    Code Status: Level 1 - Full Code  POA:    POLST:      Reason for ICU admission:   Diabetic ketoacidosis      Active problems:   Principal Problem:    Diabetic ketoacidosis without coma associated with type 2 diabetes mellitus ([facility] )  Active Problems:    Hypertension    Thoracic aortic aneurysm without rupture (HCC)    CKD (chronic kidney disease) stage 3, GFR 30-59 ml/min (HCC)    Class 3 severe obesity in [facility])    Type 2 diabetes mellitus ([facility] )    Hyperlipidemia    Hypokalemia  Resolved Problems:    Hyponatremia      Hypertension  Assessment & Plan  Continue home amlodipine 5 mg daily  Consider transition to ACE-inhibitor or ARB    Hyponatremia-resolved as of 3/11/2022  Assessment & Plan  Secondary to hyperglycemia in the setting of DKA  Now resolved after treatment of DKA    CKD (chronic kidney disease) stage 3, GFR 30-59 ml/min [facility])  Assessment & Plan  Lab Results   Component Value Date    EGFR 98 03/10/2022    EGFR 90 03/10/2022    EGFR 69 03/10/2022    CREATININE 0 91 03/10/2022    CREATININE 0 98 03/10/2022    CREATININE 1 22 03/10/2022     Presented with creatinine of 1 22 from baseline of 1 2-1 5, no NESTOR  Would likely benefit from starting ACE-inhibitor or ARB    Class 3 severe obesity in [facility])  Assessment & Plan  Will need close outpatient follow-up and support for lifestyle modification    Hypokalemia  Assessment & Plan  Secondary to diabetic ketoacidosis and subsequent treatment with insulin  Continue to monitor and replete potassium as needed  Potassium 3 2 today, Phos 2 9, Mg 1 7  - will give Phos-Nak, x2, K-Dur 40 mEq p o , and IV magnesium sulfate 1 g    Hyperlipidemia  Assessment & Plan   Component Value Units   Cholesterol 223 High   mg/dL   Triglycerides 169 High   mg/dL   HDL, Direct 39 Low  mg/dL   LDL Calculated 150 High   mg/dL   Non-HDL-Chol (CHOL-HDL) 184 mg/dl        10 year ASCVD risk 15 1%  Patient would benefit from starting high-intensity statin therapy    Type 2 diabetes mellitus [facility])  Assessment & Plan  Lab Results   Component Value Date    HGBA1C >14 0 (H) 03/08/2022       Recent Labs     03/10/22  2209 03/10/22  2351 03/11/22  0104 03/11/22  0610   POCGLU 160* 139 138 228*       Blood Sugar Average: Last 72 hrs:  (P) 159 [id number]     - A1c greater than 14%  - Initial blood glucose was 746   - initially presenting in DKA, now resolved  - now transitioned from insulin drip to subcutaneous sliding scale insulin  Plan:  - now started sliding scale insulin algorithm 4  - received 10u lantus this am  - trend and replete potassium as needed  - will need extensive diabetes education and close outpatient follow-up  - endocrine consult placed       * Diabetic ketoacidosis without coma associated with type 2 diabetes mellitus [facility])  Assessment & Plan  Lab Results   Component Value Date    HGBA1C >14 0 (H) 03/08/2022       Recent Labs     03/10/22  2209 03/10/22  2351 03/11/22  0104 03/11/22  0610   POCGLU 160* 139 138 228*       Blood Sugar Average: Last 72 hrs:  (P) 159 [id number]     - A1c greater than 14%  - Initial blood glucose was 746 with   - beta hydroxybutyrate of 1 9, hemoglobin A1c > 14 %  His   - venous pH was 7 37, pCO2 36 5, serum bicarb is 20, anion gap 18,   - sodium 126 (corrected 136), potassium 3 8  - UA positive for ketones     - Insulin drip was started at 8 units/hour  - blood glucose decreased from 746 at 13:37 to 367 at 16:20  - insulin drip subsequently decreased to 4 units/hour  Plan:  - status post insulin drip DKA protocol  - received d5 1/2NS at 250cc/hr, now discontinued  - status post anion gap closure demonstrated on 2 BMPs  - received 10u lantus this am  - now started sliding scale insulin algorithm 4  - trend and replete potassium  - will need extensive diabetes education and close outpatient follow-up         Consultants:   None    History of Present Illness: This is a 17-year-old male with a history of recently diagnosed type 2 diabetes and hypertension  He started insulin and metformin 3 days prior to admission  Recent hemoglobin A1c was greater than 14%  He presented to the [facility] ED at the urging of his PCP after his fingerstick glucose readings at home were noted to be undetectably high  He has been experiencing recent polydipsia, polyphasia, and polyuria  He denies any symptoms of diabetic neuropathy including numbness or tingling, or changes in vision  Patient reports he has previously worked as a  but he has been out of work for the past 4 months  He received a recent diagnosis of COVID and completed treatment with steroids in mid January  He was noted to be hyperglycemic while he was on steroids  He has not taken steroids since  He denies significant alcohol use or smoking  He denies any recreational drug use  Summary of clinical course:   Initial blood glucose was 746  Beta hydroxybutyrate was elevated at 1 9, and he had an anion gap metabolic acidosis  He was started on insulin drip and transferred to [facility] for step down 1 level of care for management of his DKA  He was started on DKA protocol insulin drip and IV normal saline  He was transition to D5 half-normal saline once his blood glucose was less than 250  His anion gap closed and he was transitioned to subcutaneous sliding scale insulin  He has been started on a carb controlled diet  His home amlodipine has been continued  His electrolytes have been closely monitored and repleted as needed  Today, the patient reports feeling well and offers no complaints  He reports that he slept well  He reports that his increased thirst and urinary frequency have resolved   Now that his DKA has resolved, he is appropriate for transition to med surg unit for continued monitoring of his blood glucose, electrolytes, and insulin requirements with subsequent transition to a basal bolus regimen  Going forward, he would benefit from starting high-intensity statin therapy and an ACE-inhibitor or ARB  He will need diabetes education and close outpatient follow-up with his PCP continued management of his type 2 diabetes    Review of Systems   Constitutional: Negative for chills, diaphoresis, fatigue and fever  HENT: Negative for congestion and sore throat  Eyes: Negative for visual disturbance  Respiratory: Negative for cough, shortness of breath and wheezing  Cardiovascular: Negative for chest pain, palpitations and leg swelling  Gastrointestinal: Negative for abdominal pain, constipation, diarrhea, nausea and vomiting  Endocrine:  Negative for polydipsia and polyuria  Genitourinary: Negative for dysuria  Musculoskeletal: Negative for arthralgias and myalgias  Skin: Negative for rash and wound  Neurological: Negative for dizziness, weakness, light-headedness, numbness and headaches  Psychiatric/Behavioral: Negative for agitation and confusion  A 12-point, complete review of systems was reviewed and negative except as stated above     Physical Exam  Vitals and nursing note reviewed  Constitutional:       General: He is not in acute distress  Appearance: Normal appearance  He is well-developed  He is obese  HENT:      Head: Normocephalic and atraumatic  Nose: Nose normal       Mouth/Throat:      Mouth: Mucous membranes are moist       Pharynx: Oropharynx is clear  Eyes:      Extraocular Movements: Extraocular movements intact  Conjunctiva/sclera: Conjunctivae normal       Pupils: Pupils are equal, round, and reactive to light  Cardiovascular:      Rate and Rhythm: Normal rate and regular rhythm  Pulses: Normal pulses  Heart sounds: Normal heart sounds  No murmur heard  Pulmonary:      Effort: Pulmonary effort is normal  No respiratory distress        Breath sounds: Normal breath sounds  No wheezing  Abdominal:      Palpations: Abdomen is soft  Tenderness: There is no abdominal tenderness  There is no guarding  Musculoskeletal:      Cervical back: Neck supple  Right lower leg: No edema  Left lower leg: No edema  Skin:     General: Skin is warm and dry  Capillary Refill: Capillary refill takes less than 2 seconds  Neurological:      General: No focal deficit present  Mental Status: He is alert and oriented to person, place, and time  Psychiatric:         Mood and Affect: Mood normal          Behavior: Behavior normal          Thought Content: Thought content normal        Recent or scheduled procedures:   None    Outstanding/pending diagnostics:   None    Cultures:   None       Mobilization Plan:   PT/OT    Nutrition Plan:   Level 1 carb controlled diet    Invasive Devices Review  Invasive Devices  Report    Peripheral Intravenous Line            Peripheral IV 03/10/22 Right Forearm 1 day    Peripheral IV 03/10/22 Distal;Right;Upper;Ventral (anterior) Arm <1 day                Rationale for remaining devices:  Peripheral IV for IV medications    VTE Pharmacologic Prophylaxis: Enoxaparin (Lovenox)  VTE Mechanical Prophylaxis: sequential compression device      Discharge Plan:   Consider discharge pending establishment of a basal bolus insulin regimen, diabetes education, and evaluation by PT and OT  Initial Physical Therapy Recommendations:  Pending  Initial Occupational Therapy Recommendations:  Pending  Initial /Plan:  Pending    Home medications that are not reordered and reason why:   Home diabetic regimen held for treatment of DKA and establishment of any regimen    Spoke with Dr Josue Blanchard from [facility] regarding transfer  Please contact critical care via Kaylene Stratton with any questions or concerns  Portions of the record may have been created with voice recognition software    Occasional wrong word or "sound a like" substitutions may have occurred due to the inherent limitations of voice recognition software  Read the chart carefully and recognize, using context, where substitutions have occurred    Cleveland Sage MD  Transitional Year Resident, PGY1  8:53 AM

## 2022-03-11 NOTE — PLAN OF CARE
Problem: Potential for Falls  Goal: Patient will remain free of falls  Description: INTERVENTIONS:  - Educate patient/family on patient safety including physical limitations  - Instruct patient to call for assistance with activity   - Consult OT/PT to assist with strengthening/mobility   - Keep Call bell within reach  - Keep bed low and locked with side rails adjusted as appropriate  - Keep care items and personal belongings within reach  - Initiate and maintain comfort rounds  - Nathan Rubio

## 2022-03-11 NOTE — ASSESSMENT & PLAN NOTE
Lab Results   Component Value Date    EGFR 98 03/10/2022    EGFR 90 03/10/2022    EGFR 69 03/10/2022    CREATININE 0 91 03/10/2022    CREATININE 0 98 03/10/2022    CREATININE 1 22 03/10/2022     Presented with creatinine of 1 22 from baseline of 1 2-1 5, no NESTOR  Would likely benefit from starting ACE-inhibitor or ARB

## 2022-03-11 NOTE — ASSESSMENT & PLAN NOTE
Lab Results   Component Value Date    HGBA1C >14 0 (H) 03/08/2022       Recent Labs     03/10/22  2209 03/10/22  2351 03/11/22  0104 03/11/22  0610   POCGLU 160* 139 138 228*       Blood Sugar Average: Last 72 hrs:  (P) 159 [id number]     - A1c greater than 14%  - Initial blood glucose was 746 with   - beta hydroxybutyrate of 1 9, hemoglobin A1c > 14 %  His   - venous pH was 7 37, pCO2 36 5, serum bicarb is 20, anion gap 18,   - sodium 126 (corrected 136), potassium 3 8  - UA positive for ketones     - Insulin drip was started at 8 units/hour  - blood glucose decreased from 746 at 13:37 to 367 at 16:20  - insulin drip subsequently decreased to 4 units/hour  Plan:  - status post insulin drip DKA protocol  - received d5 1/2NS at 250cc/hr, now discontinued  - status post anion gap closure demonstrated on 2 BMPs  - received 10u lantus this am  - now started sliding scale insulin algorithm 4  - trend and replete potassium  - will need extensive diabetes education and close outpatient follow-up

## 2022-03-11 NOTE — CONSULTS
Consultation - Orion Isaac  54 y o  male MRN: [id number]    Unit/Bed#: [facility] 205-01 Encounter: [id number]      Assessment/Plan       Diabetes Mellitus  82-year-old male presenting with symptomatic hyperglycemia, and noted for A1c of 14% during PCP work-up  Patient underwent a course of corticosteroids for management of COVID 19 pneumonia, inpatient, and tapered on discharge  This is a potential contributing factor to hyperglycemia, but patient is also noted for a prior history of elevated BMI and hyperlipidemia as well, so there is also a likely underlying innate metabolic component  Patient did present with hyperglycemia, and ketosis, but venous pH of 7 37 which would roughly extrapolate to a normal arterial pH, so would not necessarily label this DKA  Additionally, there is evidence of microalbuminuria on previous urine chemistry and dyslipidemia on lipid panel  Patient is not noted to be on ACE-inhibitor/ ARB nor on a statin  Will convert to basal-bolus insulin regimen at roughly 0 5 units/ Kg forTDD; begin glargine 30 units + lispro 10 units tid ac + correctional insulin (Algorithm #4 ac + algorithm #3 hs)  Obtain poc glucose tid + ac + maintain hypoglycemia protocol  Carb-controlled diet  Provide DM education  Consider ACEI/ARB and high-intensity statin for nephro-protective and ASCVD preventative management respectively  Patient will follow-up with endocrinology as outpatient for longitudinal care after discharge  Will obtain antibody panel to assess for the possibility of DM type 1         CC: Diabetes Consult    History of Present Illness     HPI: Orion Isaac  is a 82-year-old black male with a new diagnosis of diabetes mellitus type 2, noted for hyperglycemia during PCP visit earlier this week, in addition to an A1c of 14%; subsequent to this, he had been started on degludec and metformin by PCP, but ambulatory plasma glucose was persistently high    Patient presented with elevated ketones, with a beta hydroxybutyrate of 1 9, anion gap of 18 and pH 7 37  Patient derived improvement in ketomenia and closing of anion gap, with 10 units of regular insulin x 1, followed by initiation of insulin gtt, and IV fluids with dextrose as indicated  Received glargine 10 units x 1 on the morning of this consultation  Patient had been transitioned to subcutaneous regimen with current average poc glucose around 185  He is noted for a history of COVID 19 pneumonia in December 2021, with continuation of steroid taper in to January  He began developing increased thirst and urination during this time, suggestive of hyperglycemia  He reports no known family history of diabetes  He denies any history of heart attack or stroke  Inpatient consult to Endocrinology     Performed by  Odessa Marte MD     Authorized by Mathieu Og MD              Review of Systems   Constitutional: Negative for chills and fever  HENT: Negative for ear pain and sore throat  Eyes: Negative for pain and visual disturbance  Respiratory: Negative for cough and shortness of breath  Cardiovascular: Negative for chest pain and palpitations  Gastrointestinal: Negative for abdominal pain, constipation, diarrhea and vomiting  Endocrine: Positive for polydipsia and polyuria  Genitourinary: Negative for dysuria and hematuria  Musculoskeletal: Negative for arthralgias, back pain and myalgias  Skin: Negative for color change and rash  Neurological: Negative for dizziness, seizures, syncope, weakness and headaches  Psychiatric/Behavioral: Negative for agitation and behavioral problems  All other systems reviewed and are negative        Historical Information   Past Medical History:   Diagnosis Date    Aneurysm of thoracic aorta (HCC)     Cardiomegaly     HBP (high blood pressure)     Hyperglycemia     Lab test positive for detection of COVID-19 virus 12/12/2021    Low back pain     Meralgia paresthetica, right lower limb     meralgia paresthetica of right leg    Obesity     Pneumonia due to COVID-19 virus 12/16/2021    Bilateral COVID 19 pneumonia    Seasonal allergies     Vitamin D deficiency     Wears glasses      Past Surgical History:   Procedure Laterality Date    NO PAST SURGERIES       Social History   Social History     Substance and Sexual Activity   Alcohol Use Yes    Comment: social     Social History     Substance and Sexual Activity   Drug Use Never     Social History     Tobacco Use   Smoking Status Never Smoker   Smokeless Tobacco Never Used     Family History:   Family History   Problem Relation Age of Onset    Breast cancer Sister     Diabetes Brother     Blindness Mother     No Known Problems Father     Sickle cell trait Son     Stroke Maternal Aunt     Down syndrome Sister        Meds/Allergies   Current Facility-Administered Medications   Medication Dose Route Frequency Provider Last Rate Last Admin    amLODIPine (NORVASC) tablet 5 mg  5 mg Oral Daily Sinda Guitar, DO   5 mg at 03/11/22 0948    enoxaparin (LOVENOX) subcutaneous injection 40 mg  40 mg Subcutaneous Daily Sinda Guitar, DO   40 mg at 03/11/22 0948    insulin glargine (LANTUS) subcutaneous injection 15 Units 0 15 mL  15 Units Subcutaneous HS Chito Henao DO        insulin lispro (HumaLOG) 100 units/mL subcutaneous injection 2-12 Units  2-12 Units Subcutaneous TID AC Dillon A Paster, DO   6 Units at 03/11/22 1244    melatonin tablet 6 mg  6 mg Oral HS Terrie Crabtree, DO   6 mg at 03/10/22 2215     No Known Allergies    Objective   Vitals: Blood pressure 117/84, pulse 98, temperature 98 7 °F (37 1 °C), temperature source Oral, resp  rate 15, height 5' 9" (1 753 m), weight 114 kg (251 lb 5 2 oz), SpO2 96 %      Intake/Output Summary (Last 24 hours) at 3/11/2022 1342  Last data filed at 3/11/2022 0121  Gross per 24 hour   Intake 2025 95 ml   Output 400 ml   Net 1625 95 ml     Invasive Devices  Report    Peripheral Intravenous Line            Peripheral IV 03/10/22 Distal;Right;Upper;Ventral (anterior) Arm 1 day    Peripheral IV 03/10/22 Right Forearm 1 day                Physical Exam  Vitals reviewed  Constitutional:       Appearance: Normal appearance  HENT:      Head: Normocephalic and atraumatic  Mouth/Throat:      Mouth: Mucous membranes are moist    Eyes:      Extraocular Movements: Extraocular movements intact  Cardiovascular:      Rate and Rhythm: Normal rate and regular rhythm  Heart sounds: S1 normal and S2 normal    Pulmonary:      Effort: Pulmonary effort is normal       Breath sounds: Normal breath sounds  Abdominal:      Palpations: Abdomen is soft  Musculoskeletal:         General: Normal range of motion  Cervical back: Neck supple  Right lower leg: No edema  Left lower leg: No edema  Skin:     General: Skin is warm and dry  Neurological:      Mental Status: He is alert and oriented to person, place, and time  Psychiatric:         Mood and Affect: Mood normal          Behavior: Behavior normal          The history was obtained from the review of the chart, patient      Lab Results:   Results from last 7 days   Lab Units 03/08/22  1240   HEMOGLOBIN A1C % >14 0*     Lab Results   Component Value Date    WBC 5 12 03/11/2022    HGB 13 7 03/11/2022    HCT 40 3 03/11/2022    MCV 78 (L) 03/11/2022     03/11/2022     Lab Results   Component Value Date/Time    BUN 11 03/11/2022 06:06 AM    K 3 5 03/11/2022 06:06 AM     03/11/2022 06:06 AM    CO2 22 03/11/2022 06:06 AM    CO2 25 12/21/2021 04:53 AM    CREATININE 0 88 03/11/2022 06:06 AM    AST 13 03/10/2022 01:37 PM    ALT 18 03/10/2022 01:37 PM    ALB 4 1 03/10/2022 01:37 PM     Recent Labs     03/10/22  1337   HDL 39*   TRIG 169*     No results found for: [id number]  POC Glucose (mg/dl)   Date Value   03/11/2022 289 (H)   03/11/2022 228 (H)   03/11/2022 228 (H)   03/11/2022 138   03/10/2022 139 03/10/2022 160 (H)   03/10/2022 150 (H)   03/10/2022 140   03/10/2022 160 (H)   03/10/2022 227 (H)       Imaging Studies: I have personally reviewed pertinent reports  Portions of the record may have been created with voice recognition software

## 2022-03-11 NOTE — UTILIZATION REVIEW
Initial Clinical Review    Admission: Date/Time/Statement:   Admission Orders (From admission, onward)     Ordered        03/10/22 1729  Inpatient Admission  Once                      Orders Placed This Encounter   Procedures    Inpatient Admission     Standing Status:   Standing     Number of Occurrences:   1     Order Specific Question:   Level of Care     Answer:   Critical Care [15]     Order Specific Question:   Estimated length of stay     Answer:   More than 2 Midnights     Order Specific Question:   Certification     Answer:   I certify that inpatient services are medically necessary for this patient for a duration of greater than two midnights  See H&P and MD Progress Notes for additional information about the patient's course of treatment  3/10/2022 (3 hours)  [facility] Emergency Department  Diabetic ketoacidosis without coma  Transfer to [facility] for higher level of care  Prior to arrival:  Iv insulin gtt, iv  9% ns bolus     Initial Presentation:     54year old male received from [address] [place] [address] for inpatient critical care admission to further evaluate and treat diabetic ketoacidosis  PMHX: DM2, HTN, OBESITY  Clinical assessment significant for glucose>700, positive beta-hydroxybutyrate and anion gap metabolic acidosis  Plan includes: iv D5  50/hr, iq insulin  Date:  3- 11-22  Day 2: inpatient med surg    Change iv insulin to sq insulin sliding scale  Give mag so4 x1, kdur 40 meq x1, k phos x1  Consult endocrinology        ED Triage Vitals [03/10/22 1745]   98 °F (36 7 °C) 104 18 119/78 99 %      Oral Monitor         No Pain          03/10/22 114 kg (251 lb 5 2 oz)     Additional Vital Signs:     Date/Time Temp Pulse Resp BP MAP (mmHg) SpO2 O2 Device   03/11/22 0800 -- 92 39   Abnormal  115/88 97 98 % --   03/11/22 0700 98 1 °F (36 7 °C) -- -- -- -- -- --   03/11/22 0615 -- 90 -- 123/84 98 99 % --   03/10/22 2300 -- 100 -- 119/77 88 95 % --   03/10/22 2250 98 7 °F (37 1 °C) -- -- -- -- -- --   03/10/22 1915 -- 104 -- 117/73 86 99 % --   03/10/22 1913 98 2 °F (36 8 °C) -- -- -- -- -- --   03/10/22 1745 98 °F (36 7 °C) 104 18 119/78 103 99 % None (Room air)               Pertinent Labs/Diagnostic Test Results:     CHEST    ED  3-10-22  [place]  INDICATION:   DKA  THE PATIENT TESTED COVID-19 POSITIVE ON 12/12/2021      Suboptimal inspiration with mild bibasilar opacities again noted without change  These may be related to the patient's known recent Covid 19 pneumonia and/or sequelae thereof              Results from last 7 days   Lab Units 03/11/22  0953 03/11/22  0605 03/10/22  1337   WBC Thousand/uL 5 12 4 49 4 53   HEMOGLOBIN g/dL 13 7 13 6 16 6   HEMATOCRIT % 40 3 38 6 47 3   PLATELETS Thousands/uL 184 181 233   NEUTROS ABS Thousands/µL 2 55 2 12 2 26         Results from last 7 days   Lab Units 03/11/22  0606 03/10/22  2346 03/10/22  1802 03/10/22  1337   SODIUM mmol/L 136 140 139 126*   POTASSIUM mmol/L 3 5 3 2* 3 1* 3 8   CHLORIDE mmol/L 105 109* 108 88*   CO2 mmol/L 22 23 24 20*   ANION GAP mmol/L 9 8 7 18*   BUN mg/dL 11 15 12 19   CREATININE mg/dL 0 88 0 91 0 98 1 22   EGFR ml/min/1 73sq m 100 98 90 69   CALCIUM mg/dL 8 2* 8 8 8 8 9 7   MAGNESIUM mg/dL 1 6  --  1 7  --    PHOSPHORUS mg/dL 4 0  --  2 9  --      Results from last 7 days   Lab Units 03/10/22  1337   AST U/L 13   ALT U/L 18   ALK PHOS U/L 129*   TOTAL PROTEIN g/dL 7 6   ALBUMIN g/dL 4 1   TOTAL BILIRUBIN mg/dL 0 63     Results from last 7 days   Lab Units 03/11/22  0757 03/11/22  0610 03/11/22  0104 03/10/22  2351 03/10/22  2209 03/10/22  2102 03/10/22  2002 03/10/22  1906 03/10/22  1733 03/10/22  1620 03/10/22  1325   POC GLUCOSE mg/dl 228* 228* 138 139 160* 150* 140 160* 227* 367* >500*     Results from last 7 days   Lab Units 03/11/22  0606 03/10/22  2346 03/10/22  1802 03/10/22  1337   GLUCOSE RANDOM mg/dL 223* 138 218* 746*         Results from last 7 days   Lab Units 03/08/22  1240 HEMOGLOBIN A1C % >14 0*   EAG mg/dl >355     BETA-HYDROXYBUTYRATE   Date Value Ref Range Status   03/10/2022 1 9 (H) <0 6 mmol/L Final          Results from last 7 days   Lab Units 03/10/22  1802 03/10/22  1445 03/10/22  1337   PH ZHANNA  7 417* 7 368 7 362   PCO2 ZHANNA mm Hg 37 3* 36 5*  --    PO2 ZHANNA mm Hg 53 9* 51 7*  --    HCO3 ZHANNA mmol/L 23 5* 20 5*  --    BASE EXC ZHANNA mmol/L -0 6 -4 1  --    O2 CONTENT ZHANNA ml/dL 18 8 19 0  --    O2 HGB, VENOUS % 85 7* 83 6*  --          Results from last 7 days   Lab Units 03/10/22  1802   LACTIC ACID mmol/L 1 6         Results from last 7 days   Lab Units 03/10/22  1356   CLARITY UA  Clear   COLOR UA  Yellow   SPEC GRAV UA  <=1 005   PH UA  6 0   GLUCOSE UA mg/dl 3+*   KETONES UA mg/dl 15 (1+)*   BLOOD UA  Negative   PROTEIN UA mg/dl Negative   NITRITE UA  Negative   BILIRUBIN UA  Negative   UROBILINOGEN UA E U /dl 0 2   LEUKOCYTES UA  Negative         ED Treatment:   Medication Administration - No Administrations Displayed (No Start Event Found)     None        Past Medical History:   Diagnosis Date    Aneurysm of thoracic aorta ([facility])     Cardiomegaly     HBP (high blood pressure)     Hyperglycemia     Lab test positive for detection of COVID-19 virus 12/12/2021    Low back pain     Meralgia paresthetica, right lower limb     meralgia paresthetica of right leg    Obesity     Pneumonia due to COVID-19 virus 12/16/2021    Bilateral COVID 19 pneumonia    Seasonal allergies     Vitamin D deficiency     Wears glasses      Present on Admission:   Thoracic aortic aneurysm without rupture ([facility] )   (Resolved) Hyponatremia   Hypertension   Class 3 severe obesity in adult ([facility] )   CKD (chronic kidney disease) stage 3, GFR 30-59 ml/min ([facility])   Type 2 diabetes mellitus ([facility])      Admitting Diagnosis: DKA (diabetic ketoacidosis) ([facility]) [E11 10]     Age/Sex: 54 y o  male       Scheduled Medications:    amLODIPine, 5 mg, Oral, Daily  enoxaparin, 40 mg, Subcutaneous, Daily  insulin glargine, 15 Units, Subcutaneous, HS  insulin lispro, 2-12 Units, Subcutaneous, TID AC  melatonin, 6 mg, Oral, HS  polyethylene glycol, 17 g, Oral, Daily  senna-docusate sodium, 2 tablet, Oral, BID      Continuous IV Infusions:     PRN Meds:       IP CONSULT TO CASE MANAGEMENT  IP CONSULT TO ENDOCRINOLOGY    Network Utilization Review Department  ATTENTION: Please call with any questions or concerns to [phone] and carefully listen to the prompts so that you are directed to the right person  All voicemails are confidential   Brinda Larson all requests for admission clinical reviews, approved or denied determinations and any other requests to dedicated fax number below belonging to the campus where the patient is receiving treatment   List of dedicated fax numbers for the Facilities:  [address] DENIALS (Administrative/Medical Necessity) [phone]   [address] (Maternity/NICU/Pediatrics) [phone]   [address]  [address] [id number] [place] [address] [address] [facility] [facility] [phone]

## 2022-03-11 NOTE — ASSESSMENT & PLAN NOTE
Secondary to diabetic ketoacidosis and subsequent treatment with insulin  Continue to monitor and replete potassium as needed  Potassium 3 2 today, Phos 2 9, Mg 1 7  - will give Phos-Nak, x2, K-Dur 40 mEq p o , and IV magnesium sulfate 1 g

## 2022-03-11 NOTE — OCCUPATIONAL THERAPY NOTE
Occupational Therapy Evaluation     Patient Name: Rosetta Newell  Today's Date: 3/11/2022  Problem List  Principal Problem:    Diabetic ketoacidosis without coma associated with type 2 diabetes mellitus ([facility] )  Active Problems:    Hypertension    Thoracic aortic aneurysm without rupture (HCC)    Class 3 severe obesity in adult [facility])    CKD (chronic kidney disease) stage 3, GFR 30-59 ml/min ([facility])    Type 2 diabetes mellitus ([facility] )    Hyperlipidemia    Hypokalemia    Past Medical History  Past Medical History:   Diagnosis Date    Aneurysm of thoracic aorta (HCC)     Cardiomegaly     HBP (high blood pressure)     Hyperglycemia     Lab test positive for detection of COVID-19 virus 12/12/2021    Low back pain     Meralgia paresthetica, right lower limb     meralgia paresthetica of right leg    Obesity     Pneumonia due to COVID-19 virus 12/16/2021    Bilateral COVID 19 pneumonia    Seasonal allergies     Vitamin D deficiency     Wears glasses      Past Surgical History  Past Surgical History:   Procedure Laterality Date    NO PAST SURGERIES          03/11/22 1125   OT Last Visit   OT Visit Date 03/11/22   Note Type   Note type Evaluation   Restrictions/Precautions   Weight Bearing Precautions Per Order No   Other Precautions Telemetry   Pain Assessment   Pain Assessment Tool 0-10   Pain Score No Pain   Home Living   Type of [address] [place] [address] Two level; Able to live on main level with bedroom/bathroom  (0 KEN[address]LL; full bath first floor)   Bathroom Shower/Tub Tub/shower unit  (Tub/shower upstairs; walk-in shower downstairs)   Bathroom Toilet Standard   Additional Comments Pt reports no DME use pta   Prior Function   Level of [place] Independent with ADLs and functional mobility   Lives With Spouse   Receives Help From Family   ADL Assistance Independent   IADLs Independent   Falls in the last 6 months 0   Vocational Other (Comment)   Comments Pt reports living with supportive wife who works from home and can assist prn   Lifestyle   Autonomy Independent in ADLs, IADLs, and functional mobility pta   Reciprocal Relationships Supportive wife   Service to Others Currently on leave from work   Intrinsic Gratification Enjoys watching sports   Subjective   Subjective Pt pleasant and cooperative throughout session   ADL   [address] 7  Independent   LB [address]  [address]   Supine to Sit 7  Independent   Additional Comments Pt left seated in bedside recliner with all needs met at conclusion of session   Transfers   Sit to Stand 7  Independent   Stand to [facility] 7  Independent   Additional Comments No AD   Activity Tolerance   Activity Tolerance Patient tolerated treatment well   Medical Staff Made Aware DPT Marcelino Nails; PT student Salome   Nurse Made Aware RN cleared for therapy   RUE Assessment   RUE Assessment WFL   LUE Assessment   LUE Assessment WFL   Cognition   Overall Cognitive Status WFL   Arousal/Participation Alert; Responsive; Cooperative   Attention Within functional limits   Orientation Level Oriented X4   Memory Within functional limits   Following Commands Follows all commands and directions without difficulty   Assessment   Prognosis Good   Assessment Pt  is 54 y o  male admitted to [facility] on 3/10/2022 s/p Diabetic ketoacidosis without coma associated with type 2 diabetes mellitus ([facility] )  PMH includes DM, HTN, obesity and CKD, hyperlipidemia  Pt  currently resides with his wife in a two story home with 0 KENDELL  Prior to admission pt  was independent in ADLs, IADLs, and functional mobility  At baseline, pt  does not require the use of AD for functional mobility   Currently, pt  is independent in all ADLs, IADLs, and functional mobility  Pt  demonstrates no deficits in occupational performance  OT d/c recommendations include home with no further rehabilitation services due to functioning at baseline level  No further inpatient OT needs, d/c from OT caseload  Goals   Patient Goals To go home   Plan   OT Frequency Eval only   Recommendation   OT Discharge Recommendation No rehabilitation needs   OT - OK to Discharge Yes   AM-PAC Daily Activity Inpatient   Lower Body Dressing 4   Bathing 4   Toileting 4   Upper Body Dressing 4   Grooming 4   Eating 4   Daily Activity Raw Score 24   Daily Activity Standardized Score (Calc for Raw Score >=11) 57 54   AM-PAC Applied Cognition Inpatient   Following a Speech/Presentation 4   Understanding Ordinary Conversation 4   Taking Medications 4   Remembering Where Things Are Placed or Put Away 4   Remembering List of 4-5 Errands 4   Taking Care of Complicated Tasks 4   Applied Cognition Raw Score 24   Applied Cognition Standardized Score 62 21     The patient's raw score on the AM-PAC Daily Activity inpatient short form is 24, standardized score is 57 54, greater than 39 4  Patients at this level are likely to benefit from discharge to home  Please refer to the recommendation of the Occupational Therapist for safe discharge planning           ANIA Dick

## 2022-03-11 NOTE — CASE MANAGEMENT
Case Management Assessment & Discharge Planning Note    Patient name Joycie Nyhan  [facility] MRN [id number]  : 1970 Date 3/11/2022       Current Admission Date: 3/10/2022  Current Admission Diagnosis:Diabetic ketoacidosis without coma associated with type 2 diabetes mellitus [facility])   Patient Active Problem List    Diagnosis Date Noted    Hyperlipidemia 2022    Hypokalemia 2022    Diabetic ketoacidosis without coma associated with type 2 diabetes mellitus ([facility] ) 03/10/2022    Type 2 diabetes mellitus ([facility] ) 2022    Polydipsia 2022    Abnormal CXR 2022    Pneumonia due to COVID-19 virus 2022    CKD (chronic kidney disease) stage 3, GFR 30-59 ml/min ([facility] ) 2021    Acute hypoxemic respiratory failure due to COVID-19 ([facility] ) 2021    Class 3 severe obesity in adult [facility]) 2021    Hypertension 2021    Thoracic aortic aneurysm without rupture ([facility] ) 2021      LOS (days): 1  Geometric Mean LOS (GMLOS) (days):   Days to GMLOS:     OBJECTIVE:    Risk of Unplanned Readmission Score: 15         Current admission status: Inpatient       Preferred Pharmacy:   [facility]/pharmacy #[id number]- [address] [place] [address]  Phone: [phone] Fax: [phone]    Primary Care Provider: Jose Carlson MD    Primary Insurance: BLUE CROSS  Secondary Insurance:     ASSESSMENT:  [address] Miranda Representative - Spouse   Primary Phone: [phone] (Mobile)  Home Phone: [phone]               Advance Directives  Does patient have a [address]?: No  Was patient offered paperwork?: Yes  Does patient currently have a Health Care decision maker?: No  Does patient have Advance Directives?: No  Was patient offered paperwork?: Yes  Primary Contact: Sha Dietrich (Spouse)              Patient Information  Admitted from[de-identified] Home  Mental Status: Alert  During Assessment patient was accompanied by: Not accompanied during assessment  Assessment information provided by[de-identified] Patient  Primary Caregiver: Self  Support Systems: Spouse/significant Selvin Guan of Residence: [address] [facility] [address] do you live in?: [address] access options   Select all that apply : No steps to enter home  Type of Current Residence: 3 story home  Upon entering residence, is there a bedroom on the main floor (no further steps)?: No  A bedroom is located on the following floor levels of residence (select all that apply):: 2nd Floor  Upon entering residence, is there a bathroom on the main floor (no further steps)?: No  Indicate which floors of current residence have a bathroom (select all the apply):: 2nd Floor  Number of steps to 2nd floor from main floor: One Flight  In the last 12 months, how many places have you lived?: 1  In the last 12 months, was there a time when you did not have a steady place to sleep or slept in a shelter (including now)?: No  Homeless/housing insecurity resource given?: N/A  Living Arrangements: Lives w/ Spouse/significant other  Is patient a ?: No    Activities of Daily Living Prior to Admission  Functional Status: Independent  Completes ADLs independently?: Yes  Ambulates independently?: Yes  Does patient use assisted devices?: No  Does patient currently own DME?: No  Does patient have a history of Outpatient Therapy (PT/OT)?: No  Does the patient have a history of Short-Term Rehab?: No  Does patient have a history of HHC?: No  [facility] No         Patient Information Continued  Income Source: Pension/residential  Does patient have prescription coverage?: Yes  Within the past 12 months, you worried that your food would run out before you got the money to buy more : Never true  Within the past 12 months, the food you bought just didnt last and you didnt have money to get more : Never true  Food insecurity resource given?: N/A  Does patient receive dialysis treatments?: No  Does patient have a history of Mental Health Diagnosis?: No    PHQ 2/9 Screening   Reviewed PHQ 2/9 Depression Screening Score?: Yes    Means of Transportation  Means of Transport to Appts[de-identified] Drives Self  In the past 12 months, has lack of transportation kept you from medical appointments or from getting medications?: No  In the past 12 months, has lack of transportation kept you from meetings, work, or from getting things needed for daily living?: No  Was application for public transport provided?: N/A        DISCHARGE DETAILS:    Discharge planning discussed with[de-identified] Pt  Freedom of Choice: Yes  Comments - Freedom of Choice: FOC to be given for DC planning  CM contacted family/caregiver?: No- see comments  Were Treatment Team discharge recommendations reviewed with patient/caregiver?: No  Did patient/caregiver verbalize understanding of patient care needs?: No  Were patient/caregiver advised of the risks associated with not following Treatment Team discharge recommendations?: No- see comments    Contacts  Patient Contacts: Shonna Keysing (Spouse)  Relationship to Patient[de-identified] Family  Contact Method: Phone  Phone Number: [phone] (T) [phone] (M)  Reason/Outcome: Continuity of [address]         Is the patient interested in [facility] at discharge?: No    DME Referral Provided  Referral made for DME?: No    Other Referral/Resources/Interventions Provided:  Interventions: None Indicated  Referral Comments: Pt is pending PT/OT recs for DC planning  Would you like to participate in our [address] service program?  : No - Declined    Treatment Team Recommendation: Home  Discharge Destination Plan[de-identified] Home  Transport at Discharge : Auto with designated   Dispatcher Contacted:  No              Transport Service Arrived: No  Transfer Mode: Other (Comment)        CM reviewed d/c planning process including the following: identifying help at home, patient preference for d/c planning needs, Discharge Lounge, [facility] Meds to Bed program, availability of treatment team to discuss questions or concerns patient and/or family may have regarding understanding medications and recognizing signs and symptoms once discharged  CM also encouraged patient to follow up with all recommended appointments after discharge  Patient advised of importance for patient and family to participate in managing patients medical well being

## 2022-03-11 NOTE — PLAN OF CARE
Problem: Potential for Falls  Goal: Patient will remain free of falls  Description: INTERVENTIONS:  - Educate patient/family on patient safety including physical limitations  - Instruct patient to call for assistance with activity   - Consult OT/PT to assist with strengthening/mobility   - Keep Call bell within reach  - Keep bed low and locked with side rails adjusted as appropriate  - Keep care items and personal belongings within reach  - Initiate and maintain comfort rounds  - Make Fall Risk Sign visible to staff  - Offer Toiletin

## 2022-03-12 VITALS
TEMPERATURE: 98 F | HEIGHT: 69 IN | OXYGEN SATURATION: 97 % | BODY MASS INDEX: 38.56 KG/M2 | HEART RATE: 93 BPM | WEIGHT: 260.36 LBS | SYSTOLIC BLOOD PRESSURE: 111 MMHG | RESPIRATION RATE: 18 BRPM | DIASTOLIC BLOOD PRESSURE: 84 MMHG

## 2022-03-12 LAB
ALBUMIN SERPL BCP-MCNC: 2.7 G/DL (ref 3.5–5)
ALP SERPL-CCNC: 88 U/L (ref 46–116)
ALT SERPL W P-5'-P-CCNC: 25 U/L (ref 12–78)
ANION GAP SERPL CALCULATED.3IONS-SCNC: 4 MMOL/L (ref 4–13)
AST SERPL W P-5'-P-CCNC: 20 U/L (ref 5–45)
BILIRUB SERPL-MCNC: 0.47 MG/DL (ref 0.2–1)
BUN SERPL-MCNC: 12 MG/DL (ref 5–25)
CALCIUM ALBUM COR SERPL-MCNC: 9.7 MG/DL (ref 8.3–10.1)
CALCIUM SERPL-MCNC: 8.7 MG/DL (ref 8.3–10.1)
CHLORIDE SERPL-SCNC: 106 MMOL/L (ref 100–108)
CO2 SERPL-SCNC: 26 MMOL/L (ref 21–32)
CREAT SERPL-MCNC: 0.94 MG/DL (ref 0.6–1.3)
GFR SERPL CREATININE-BSD FRML MDRD: 94 ML/MIN/1.73SQ M
GLUCOSE SERPL-MCNC: 201 MG/DL (ref 65–140)
GLUCOSE SERPL-MCNC: 221 MG/DL (ref 65–140)
GLUCOSE SERPL-MCNC: 221 MG/DL (ref 65–140)
MAGNESIUM SERPL-MCNC: 1.8 MG/DL (ref 1.6–2.6)
PHOSPHATE SERPL-MCNC: 4 MG/DL (ref 2.7–4.5)
POTASSIUM SERPL-SCNC: 3.4 MMOL/L (ref 3.5–5.3)
PROT SERPL-MCNC: 6.1 G/DL (ref 6.4–8.2)
SODIUM SERPL-SCNC: 136 MMOL/L (ref 136–145)

## 2022-03-12 PROCEDURE — 83735 ASSAY OF MAGNESIUM: CPT | Performed by: STUDENT IN AN ORGANIZED HEALTH CARE EDUCATION/TRAINING PROGRAM

## 2022-03-12 PROCEDURE — 84100 ASSAY OF PHOSPHORUS: CPT | Performed by: STUDENT IN AN ORGANIZED HEALTH CARE EDUCATION/TRAINING PROGRAM

## 2022-03-12 PROCEDURE — 83519 RIA NONANTIBODY: CPT | Performed by: INTERNAL MEDICINE

## 2022-03-12 PROCEDURE — 86341 ISLET CELL ANTIBODY: CPT | Performed by: INTERNAL MEDICINE

## 2022-03-12 PROCEDURE — 80053 COMPREHEN METABOLIC PANEL: CPT | Performed by: STUDENT IN AN ORGANIZED HEALTH CARE EDUCATION/TRAINING PROGRAM

## 2022-03-12 PROCEDURE — 86337 INSULIN ANTIBODIES: CPT | Performed by: INTERNAL MEDICINE

## 2022-03-12 PROCEDURE — 84681 ASSAY OF C-PEPTIDE: CPT | Performed by: INTERNAL MEDICINE

## 2022-03-12 PROCEDURE — 99239 HOSP IP/OBS DSCHRG MGMT >30: CPT | Performed by: NURSE PRACTITIONER

## 2022-03-12 PROCEDURE — NC001 PR NO CHARGE: Performed by: INTERNAL MEDICINE

## 2022-03-12 PROCEDURE — 82948 REAGENT STRIP/BLOOD GLUCOSE: CPT

## 2022-03-12 RX ORDER — INSULIN GLARGINE 100 [IU]/ML
36 INJECTION, SOLUTION SUBCUTANEOUS
Status: DISCONTINUED | OUTPATIENT
Start: 2022-03-12 | End: 2022-03-12 | Stop reason: HOSPADM

## 2022-03-12 RX ORDER — BLOOD SUGAR DIAGNOSTIC
STRIP MISCELLANEOUS
Qty: 100 EACH | Refills: 0 | Status: SHIPPED | OUTPATIENT
Start: 2022-03-12 | End: 2022-07-11

## 2022-03-12 RX ORDER — HUMAN INSULIN 100 [IU]/ML
10 INJECTION, SOLUTION SUBCUTANEOUS
Qty: 3 ML | Refills: 1 | Status: SHIPPED | OUTPATIENT
Start: 2022-03-12 | End: 2022-03-12 | Stop reason: SDUPTHER

## 2022-03-12 RX ORDER — BLOOD SUGAR DIAGNOSTIC
STRIP MISCELLANEOUS
Qty: 100 EACH | Refills: 0 | Status: SHIPPED | OUTPATIENT
Start: 2022-03-12 | End: 2022-03-12 | Stop reason: SDUPTHER

## 2022-03-12 RX ORDER — INSULIN DEGLUDEC INJECTION 100 U/ML
36 INJECTION, SOLUTION SUBCUTANEOUS
Qty: 15 ML | Refills: 0 | Status: SHIPPED | OUTPATIENT
Start: 2022-03-12 | End: 2022-03-14 | Stop reason: SDUPTHER

## 2022-03-12 RX ORDER — INSULIN LISPRO 100 [IU]/ML
10 INJECTION, SOLUTION INTRAVENOUS; SUBCUTANEOUS
Qty: 15 ML | Refills: 3 | Status: SHIPPED | OUTPATIENT
Start: 2022-03-12 | End: 2022-03-12 | Stop reason: HOSPADM

## 2022-03-12 RX ORDER — INSULIN ASPART 100 [IU]/ML
10 INJECTION, SOLUTION INTRAVENOUS; SUBCUTANEOUS
Qty: 15 ML | Refills: 0 | Status: SHIPPED | OUTPATIENT
Start: 2022-03-12 | End: 2022-03-12 | Stop reason: HOSPADM

## 2022-03-12 RX ORDER — HUMAN INSULIN 100 [IU]/ML
10 INJECTION, SOLUTION SUBCUTANEOUS
Qty: 3 ML | Refills: 1 | Status: SHIPPED | OUTPATIENT
Start: 2022-03-12 | End: 2022-04-06

## 2022-03-12 RX ADMIN — INSULIN LISPRO 10 UNITS: 100 INJECTION, SOLUTION INTRAVENOUS; SUBCUTANEOUS at 12:52

## 2022-03-12 RX ADMIN — ENOXAPARIN SODIUM 40 MG: 40 INJECTION SUBCUTANEOUS at 08:50

## 2022-03-12 RX ADMIN — INSULIN LISPRO 4 UNITS: 100 INJECTION, SOLUTION INTRAVENOUS; SUBCUTANEOUS at 12:52

## 2022-03-12 RX ADMIN — INSULIN LISPRO 10 UNITS: 100 INJECTION, SOLUTION INTRAVENOUS; SUBCUTANEOUS at 08:50

## 2022-03-12 RX ADMIN — AMLODIPINE BESYLATE 5 MG: 5 TABLET ORAL at 08:49

## 2022-03-12 RX ADMIN — INSULIN LISPRO 4 UNITS: 100 INJECTION, SOLUTION INTRAVENOUS; SUBCUTANEOUS at 08:50

## 2022-03-12 NOTE — DISCHARGE INSTRUCTIONS
Diabetes and Nutrition   WHAT YOU NEED TO KNOW:   Nutrition plans help with healthy eating patterns that improve health  Nutrition plans and regular exercise help keep your blood sugar levels steady  They also help delay or prevent complications of diabetes, such as diabetic kidney disease  DISCHARGE INSTRUCTIONS:   Call your local emergency number (911 in the [address] [facility]) if:   · You have any of the following signs of a heart attack:      ? Squeezing, pressure, or pain in your chest    ? You may  also have any of the following:     § Discomfort or pain in your back, neck, jaw, stomach, or arm    § Shortness of breath    § Nausea or vomiting    § Lightheadedness or a sudden cold sweat      Seek care immediately if:   · You have a low blood sugar level and it does not improve with treatment  Symptoms are trouble thinking, a pounding heartbeat, and sweating  · Your blood sugar level is above 240 mg/dL and does not come down within 15 minutes of treatment  · You have ketones in your blood or urine  · You have nausea or are vomiting and cannot keep any food or liquid down  · You have blurred or double vision  · Your breath has a fruity, sweet smell, or your breathing is shallow  Call your doctor or diabetes care team if:   · Your blood sugar levels are higher than your target goals  · You often have low blood sugar levels  · You have trouble coping with diabetes, or you feel anxious or depressed  · You have questions or concerns about your condition or care  A dietitian will help you create a nutrition plan  to meet your needs and your family's needs  The goal is for you to reach or maintain healthy weight, blood sugar, blood pressure, and lipid levels  You should meet with the dietitian at least 1 time each year   You will learn the following:  · How food affects your blood sugar levels    · How to create healthy eating habits    · How to make food choices based on your activity level, weight, and glucose levels    · How your favorite foods may fit into your plan    · How to keep track of carbohydrates    · Correct portion sizes for each food    · Changes you can make to your plan if you get pregnant or are breastfeeding    Do not skip meals: The goal is to keep your blood sugar level steady  Blood sugar levels may drop too low if you have received insulin and do not eat  Eat more high-fiber foods:  High-fiber foods include fresh or frozen fruits and vegetables, whole-grain breads, and beans  Fiber helps control or lower blood sugar and cholesterol levels  Choose whole fruits instead of fruit juice as much as possible  Sugar may be added to juice, and fiber may be removed  Choose heart-healthy fats:  Foods high in heart-healthy fats include olive oil, nuts, avocados, and fatty fish, such as salmon and tuna  Foods high in unhealthy fats include red meat, full-fat dairy products, and soft margarine  Unhealthy fats can increase your risk for heart disease, increase bad cholesterol, and lower good cholesterol  Choose complex carbohydrates:  Foods with complex carbohydrates include brown rice, whole-grain breads and cereals, and cooked beans  Foods with simple carbohydrates include white bread, white rice, most cold cereals, and snack foods  Your plan will include the amount of carbohydrate to have at one time or in a day  Your blood sugar level can get too high if you eat too much carbohydrate at one time  Blood sugar levels do not spike as high or drop as quickly with complex carbohydrates as with simple carbohydrates  Choose complex carbohydrates whenever possible  Have less sodium (salt): The risk for high blood pressure (BP) increases with high-sodium foods  Limit high-sodium foods, such as soy sauce, potato chips, and canned soup  Do not add salt to food you cook  Limit your use of table salt  Read labels to have no more than 2,300 milligrams of sodium in one day         Limit artificial sweeteners: These may be found in food or drinks, such as diet soft drinks or other low-calorie beverages  Artificial sweeteners are low in calories  They may help you lower your overall calories and carbohydrates  It is important not to have more calories from other foods to make up for the calories saved  Artificial sweeteners do not have any nutrition  Eat whole foods and drink water as much as possible  Your plan may include beverages with artificial sweeteners for a short time  These can help you transition from high-sugar beverages to water  Use the plate method for each meal:  This method can help you eat the right amount of carbohydrates and keep your blood sugar levels under control  · Draw an imaginary line down the middle of a 9-inch dinner plate  On one side, draw another line to divide that section in half  Your plate will have one large section and 2 small sections  · Fill the largest section with non-starchy vegetables  These include broccoli, spinach, cucumbers, peppers, cauliflower, and tomatoes  · Add a starch to one of the small sections  Starches include pasta, rice, whole-grain bread, tortillas, corn, potatoes, and beans  · Add meat or another source of protein to the other small section  Examples include chicken or turkey without skin, fish, lean beef or pork, low-fat cheese, tofu, and eggs  · Add dairy products or fruit next to your plate if your meal plan allows  Examples of dairy include skim or 1% milk and low-fat yogurt  If you do not drink milk or eat dairy products, you may be able to add another serving of starchy food instead  · Have a low-calorie or calorie-free drink with your meal  Examples include water or unsweetened tea or coffee  Know the risks if you choose to drink alcohol:  Alcohol can cause hypoglycemia (low blood sugar level), especially if you use insulin  Alcohol can cause high blood sugar and BP levels, and weight gain if you drink too much  Women 21 years or older and men 72 years or older should limit alcohol to 1 drink a day  Men aged 24 to 59 years should limit alcohol to 2 drinks a day  A drink of alcohol is 12 ounces of beer, 5 ounces of wine, or 1½ ounces of liquor  Hypoglycemia can happen hours after you drink alcohol  Check your blood sugar level for several hours after you drink alcohol  Have a source of fast-acting carbohydrates with you in case your level goes too low  You need immediate care if you have signs or symptoms of hypoglycemia, such as sweating, confusion, or fainting  Maintain a healthy weight:  A healthy weight can help you control your diabetes  You can maintain a healthy weight with a nutrition plan and exercise  Ask your healthcare provider how much you should weigh  Ask him or her to help you create a weight loss plan if you are overweight  Together you can set weight loss and maintenance goals  Follow up with your doctor or diabetes team as directed:  Write down your questions so you remember to ask them during your visits  © Copyright [employer] 2022 Information is for End User's use only and may not be sold, redistributed or otherwise used for commercial purposes  All illustrations and images included in CareNotes® are the copyrighted property of A D A M , Inc  or [facility]   The above information is an  only  It is not intended as medical advice for individual conditions or treatments  Talk to your doctor, nurse or pharmacist before following any medical regimen to see if it is safe and effective for you  Diabetic Ketoacidosis   WHAT YOU NEED TO KNOW:   Diabetic ketoacidosis (DKA) is a life-threatening condition caused by dangerously high blood sugar levels  Your blood sugar levels become high because your body does not have enough insulin  Insulin helps move sugar out of the blood so it can be used for energy  The lack of insulin forces your body to use fat instead of sugar for energy  As fats are broken down, they leave chemicals called ketones that build up in your blood  Ketones are dangerous at high levels  DISCHARGE INSTRUCTIONS:   Call or have someone call your local emergency number (911 in the [address]) for any of the following:   · You have a seizure  · You begin to breathe fast, or are short of breath  · You become weak and confused  Seek care immediately if:   · You are more drowsy than usual        Contact your healthcare provider if:   · You have fruity, sweet breath  · You have severe, new stomach pain and are vomiting  · Your blood sugar level is lower or higher than your healthcare provider says it should be  · You have ketones in your blood or urine  · You have a fever or chills  · You are more thirsty than usual      · You are urinating more often than usual      · You have questions or concerns about your condition or care  Medicines:   · Insulin and diabetes medicine  decreases the amount of sugar in your blood  · Take your medicine as directed  Contact your healthcare provider if you think your medicine is not helping or if you have side effects  Tell him or her if you are allergic to any medicine  Keep a list of the medicines, vitamins, and herbs you take  Include the amounts, and when and why you take them  Bring the list or the pill bottles to follow-up visits  Carry your medicine list with you in case of an emergency  Help prevent DKA: The best way to prevent DKA is to control your diabetes  Ask your healthcare provider for more information on how to manage your diabetes  The following may help decrease your risk for DKA:  · Monitor your blood sugar levels closely  if you have an infection, are stressed, sick, or experience trauma  Check your blood sugar levels often  You may need to check at least 3 times each day  If your blood sugar level is too high, give yourself insulin as directed by your healthcare provider           · Manage your sick days  When you are sick, you may not eat as much as you normally would  You may need to change the amount of insulin you give yourself  You may need to check your blood sugar level more often than usual  Make a plan with your healthcare provider about how to manage your diabetes when you are sick  · Check your ketones as directed  Follow your healthcare provider's instructions about when you should check your blood or urine for ketones  Your healthcare provider may give you a machine to check your blood ketones  Urine ketones can be checked with sticks you dip in your urine  Do not exercise if you have ketones in your urine or blood  Exercise may increase the level of ketones in your blood or urine and cause dehydration  · Know how to treat DKA  If you have signs of DKA, drink more liquids that do not contain sugar, such as water  Take your insulin as directed by your healthcare provider and go to the nearest emergency room  Follow up with your healthcare provider as directed: You may need to return often for blood tests  Write down your questions so you remember to ask them during your visits  © Copyright [employer] 2022 Information is for End User's use only and may not be sold, redistributed or otherwise used for commercial purposes  All illustrations and images included in CareNotes® are the copyrighted property of [employer] A M , Inc  or [facility] Gelaico Arnold   The above information is an  only  It is not intended as medical advice for individual conditions or treatments  Talk to your doctor, nurse or pharmacist before following any medical regimen to see if it is safe and effective for you  How to Check Your Blood Sugar   WHAT YOU NEED TO KNOW:   High blood sugar levels increase your risk for heart attack, stroke, eye problems, and kidney problems  You can decrease your risk by controlling your blood sugar levels   Low blood sugar levels can also lead to serious health problems and must be treated right away  Check your blood sugar to help you learn how food, exercise, stress, and medicines affect your levels  Keep a record of your blood sugar levels  It can be used to adjust your meal plan, exercise routine, insulin doses, or diabetes medicine if needed  DISCHARGE INSTRUCTIONS:   How to check your blood sugar level:   · Check your blood sugar level with a glucose meter  This device uses a small drop of blood to measure your blood sugar level  Some glucose meters measure a drop of blood taken from your finger using a special lancet device  Other meters will also measure a drop of blood taken from your thigh, forearm, or the palm of your hand  · Blood sugar levels change quickly after meals, after you take insulin, during exercise, and when you feel stressed or ill  It is best to use blood from your finger to check your blood sugar level during these times  Your healthcare provider will teach you how to use a glucose meter to check your blood sugar level  Ask your healthcare provider for more information about taking blood samples from areas other than your finger  When and how often to check your blood sugar level:  Ask your healthcare provider when and how often you should check your blood sugar levels  If you check your blood sugar level before a meal , it should be between 80 and 130 mg/dL  If you check your blood sugar level 2 hours after a meal , it should be less than 180 mg/dL  Ask your healthcare provider if these are good goals for you  Blood sugar levels need to be checked more often when you are sick, there is a change to your medicine, or if you change your daily routine  Test your blood sugar level if you feel like it may be too high (hyperglycemia) or too low (hypoglycemia)  Keep a record of your blood sugar levels:  Write down your blood sugar level each time you test it   Write down the date, the time of the test (including if it was before or after a meal), and the result  Write down the time you took your insulin or diabetes pills  Record the type and amount of insulin or diabetes medicine you took  Write comments about anything that may have made your blood sugar level go up or down  Your blood sugar level can be affected by exercise, eating more or less than usual, or stress  Bring this record with you to your follow-up visits  How to care for your glucose meter and test strips:  Ask your healthcare provider how and how often to check the accuracy of your meter  You may need to do the following:  · Store your equipment properly  Keep the test strips away from heat, cold, and moisture  Do not take a test strip out of the container until you are ready to use it  Put the lid back tightly on the container  Do not use test strips that are damaged, wet, or bent  · Check the expiration date  on the test strip container to be sure the test strips have not   Your blood sugar readings may be wrong if you use  test strips  Use only the type of glucose test strips that work with your glucose meter  Wear medical alert identification:  Wear medical alert jewelry or carry a card that says you have diabetes  Ask your healthcare provider where to get these items  Follow up with your healthcare provider as directed:  Write down your questions so you remember to ask them during your visits  ©  [address] Information is for End User's use only and may not be sold, redistributed or otherwise used for commercial purposes  All illustrations and images included in CareNotes® are the copyrighted property of [employer] M , Inc  or Mauricio Hicks  The above information is an  only  It is not intended as medical advice for individual conditions or treatments  Talk to your doctor, nurse or pharmacist before following any medical regimen to see if it is safe and effective for you        Hypoglycemia in a Person with Diabetes   WHAT YOU NEED TO KNOW:   Hypoglycemia is a serious condition that happens when your blood glucose (sugar) level drops too low  The blood sugar level is usually too high in a person with diabetes, but the level can also drop too low  It is important to follow your diabetes management plan to keep your blood sugar level steady  WHILE YOU ARE HERE:   Informed consent  is a legal document that explains the tests, treatments, or procedures that you may need  Informed consent means you understand what will be done and can make decisions about what you want  You give your permission when you sign the consent form  You can have someone sign this form for you if you are not able to sign it  You have the right to understand your medical care in words you know  Before you sign the consent form, understand the risks and benefits of what will be done  Make sure all your questions are answered  An IV  is a small tube placed in your vein that is used to give you medicine or liquids  Glucose: You may be given glucose tables or a food or drink with carbohydrate if you have low blood sugar  This will help bring your blood sugar level back up  Nutrition:  A dietitian will plan your meals and snacks while you are in the hospital  Chip Rivera will be given foods that are low in sugar, fat, and cholesterol  A dietitian may also help you to develop a meal plan that you can follow at home  Medicines:   · Glucagon  is medicine that helps to increase your blood sugar level if you cannot eat or drink a source of glucose  It is often given as a shot  · Glucose  may be given in your IV if you cannot eat or drink a source of glucose  You may also need glucose if you were given glucagon and your blood sugar level still did not return to normal     Tests:  Blood glucose tests may be checked 3 times a day or more  This is usually done before meals and at bedtime  RISKS:   Hypoglycemia needs immediate treatment   Even after treatment, hypoglycemia can happen again  You may have a seizure  Hypoglycemia can be life-threatening if it is not treated  CARE AGREEMENT:   You have the right to help plan your care  Learn about your health condition and how it may be treated  Discuss treatment options with your healthcare providers to decide what care you want to receive  You always have the right to refuse treatment  © Copyright [employer] 2022 Information is for End User's use only and may not be sold, redistributed or otherwise used for commercial purposes  All illustrations and images included in CareNotes® are the copyrighted property of [employer] Inc  or Jennifer Arnold   The above information is an  only  It is not intended as medical advice for individual conditions or treatments  Talk to your doctor, nurse or pharmacist before following any medical regimen to see if it is safe and effective for you  Type 2 Diabetes in the Older Adult   WHAT YOU NEED TO KNOW:   The risk for type 2 diabetes increases as a person gets older  Type 2 diabetes means your pancreas does not make enough insulin, or your body does not use insulin well  Insulin helps move sugar out of the blood so it can be used for energy  Diabetes cannot be cured, but it can be managed  DISCHARGE INSTRUCTIONS:   Have someone call your local emergency number (911 in the [address] [facility]) for any of the following:   · You have any of the following signs of a stroke:      ? Numbness or drooping on one side of your face     ? Weakness in an arm or leg    ? Confusion or difficulty speaking    ? Dizziness, a severe headache, or vision loss    · You have any of the following signs of a heart attack:      ?  Squeezing, pressure, or pain in your chest    ? You may  also have any of the following:     § Discomfort or pain in your back, neck, jaw, stomach, or arm    § Shortness of breath    § Nausea or vomiting    § Lightheadedness or a sudden cold sweat    Seek care immediately if:   · Your blood sugar level is higher than your goal and does not come down with treatment  · You have blurred or double vision  · You have signs of a high ketone level, such as fruity, sweet smelling breath, or shallow breathing  · You have symptoms of a low blood sugar level, such as trouble thinking, sweating, or a pounding heartbeat  · Your blood sugar level is lower than goal and does not improve with treatment  Call your doctor or diabetes care team if:   · You are vomiting or have diarrhea  · You have an upset stomach and cannot eat the foods on your meal plan  · You feel weak or more tired than usual     · You feel dizzy, have headaches, or are easily irritated  · Your skin is red, warm, dry, or swollen  · You have a wound that does not heal     · You have numbness in your arms or legs  · You have trouble coping with diabetes, or you feel anxious or depressed  · You have memory problems  · You have changes in your vision  · You have questions or concerns about your condition or care  Diabetes education:  You may have providers come to your house and teach you more about diabetes  You may instead attend classes with others who have diabetes  You will be taught the following:  · When and how to check your blood sugar level: You will learn when to check your blood sugar level and what the level should be  You will learn what to do if your level is too high or too low  You may need to check a drop of your blood in a glucose test machine  Write down the times of your checks and your levels  Take them to all follow-up appointments  Your diabetes care team may recommend a continuous glucose monitor (CGM)  A CGM is a device that is worn at all times  The CGM checks your blood sugar level every 5 minutes  It sends results to an electronic device such as a smart phone              · About diabetes medicine:  Oral diabetes medicine may be given to help control your blood sugar levels  Your healthcare provider will teach you how and when to take your diabetes medicine  You will also be taught when not to take the medicine  You will also be taught about side effects oral diabetes medicine can cause  Insulin may be added if oral diabetes medicine becomes less effective over time  Insulin may be injected, or given through a pump or pen  You and your care team will discuss which method is best for you  ? An insulin pump  is an implanted device that gives your insulin 24 hours a day  An insulin pump prevents the need for multiple insulin injections in a day  ? An insulin pen  is a device prefilled with the right amount of insulin  ? You and your family members will be taught how to draw up and give insulin  if this is the best method for you  Your education team will also teach you how to dispose of needles and syringes  ? You will learn how much insulin you need  and when to give it  You will be taught when not to give insulin  You will also be taught what to do if your blood sugar level drops too low  This may happen if you take insulin and do not eat the right amount of carbohydrates  · About nutrition:  A dietitian will help you make a meal plan to keep your blood sugar level steady  You will learn why protein is important  You will learn how food affects your blood sugar levels  You will also learn to keep track of sugar and starchy foods (carbohydrates)  Do not skip meals  Your blood sugar level may drop too low if you have taken insulin and do not eat  · How to prevent complications:  Diabetes that is not well controlled can lead to health problems  Examples include foot sores, retinopathy (vision loss), and peripheral neuropathy (loss of feeling in your hands and feet)  Also, risk for dementia can increase with blood sugar levels that are too high or too low for long periods of time   Your team will help you know when to get regular checkups, such as vision checks  They will teach you how to watch for problems and when to get a problem checked  What you can do to manage your blood sugar levels:  Sometimes type 2 diabetes can be managed with changes in nutrition and physical activity  · Work with your diabetes care team to create plans to meet your needs  Your diabetes care team may include a physician, nurse practitioner, and physician assistant  It may also include a diabetes nurse educator, dietitian, and an exercise specialist  Family members, or others who are close to you, may also be part of the team  You and your team will make goals and plans to manage diabetes and other health problems  For example, the plan will include how to manage medicines you may take for diabetes and for other health conditions  The plans and goals will be specific to your needs and abilities  Your plan will change as your needs and abilities change  · Try to be physically active for 30 to 60 minutes most days of the week  Physical activity, such as exercise, helps keep your blood sugar level steady and lowers your risk for heart disease  Physical activity can help improve your balance and strength and lower your risk for falls  Start slowly  Activity can be done in 10-minute intervals  ? Set a goal for 30 minutes of aerobic activity at least 5 times a week  Aerobic activity helps your heart stay strong  Aerobic activity includes walking, bicycling, dancing, swimming, and raking leaves  ? Set a goal for strength training 2 times a week  Strength training helps you keep the muscles you have and build new muscles  Strength training includes lifting weights, climbing stairs, and doing yoga or cortes chi          ? Stay steady on your feet with balancing activities  These include walking backwards, standing on one foot, and walking heel to toe in a straight line  · Maintain a healthy weight  Ask your provider what a healthy weight is for you   A healthy weight can help you control diabetes and prevent heart disease  Ask your provider to help you create a weight loss plan if you are overweight  Weight loss of 10 to 15 pounds can help make a difference in managing diabetes  Together you and your care team can set manageable weight loss goals  · Know the risks if you choose to drink alcohol  Alcohol can cause your blood sugar level to be low if you use insulin  Alcohol can cause high blood sugar levels and weight gain if you drink too much  Women 21 years or older and men 72 years or older should limit alcohol to 1 drink a day  Men 21 to 64 years should limit alcohol to 2 drinks a day  A drink of alcohol is 12 ounces of beer, 5 ounces of wine, or 1½ ounces of liquor  Your care team can tell you how many drinks are okay within 24 hours and within 1 week  What else you can do to manage diabetes:   · Check your feet every day for sores  Look at your whole foot, including the bottom, and between and under your toes  Check for wounds, corns, and calluses  Use a mirror to see the bottom of your feet  The skin on your feet may be shiny, tight, dry, or darker than normal  Your feet may also be cold and pale  Feel your feet by running your hands along the tops, bottoms, sides, and between your toes  Redness, swelling, and warmth are signs of blood flow problems that can lead to a foot ulcer  Do not try to remove corns or calluses yourself  · Wear medical alert identification  Wear medical alert jewelry or carry a card that says you have type 2 diabetes  Ask your care team where to get these items  · Do not smoke  Nicotine and other chemicals in cigarettes can cause lung disease and other health problems  It can also cause blood vessel damage that makes diabetes more difficult to manage  Ask your healthcare provider for information if you currently smoke and need help to quit   Do not use e-cigarettes or smokeless tobacco in place of cigarettes or to help you quit  They still contain nicotine  · Manage other health issues as directed  Health issues may include high blood pressure, high cholesterol levels, and heart problems  Health issues may also include depression  Together you and your care team can create a plan to manage any other health issues  · Ask about vaccines you may need  You have a higher risk for serious illness if you get the flu, pneumonia, COVID-19, or hepatitis  Ask if you should get vaccines to prevent these or other diseases, and when to get the vaccines  · Get help from family and friends  You may need help checking your blood sugar level, giving insulin injections, or preparing your meals  You may also need help to check your feet for sores  Talk to your care team if you need someone at home to help you  Follow up with your doctor or diabetes care team as directed: You will need to return to meet with different care team members  You may need tests to monitor for problems  Write down your questions so you remember to ask them during your visits  Talk with your care team or doctor if you cannot afford your medicines  © Copyright [employer] 2022 Information is for End User's use only and may not be sold, redistributed or otherwise used for commercial purposes  All illustrations and images included in CareNotes® are the copyrighted property of A D A M , Inc  or [address] [place] [address]  The above information is an  only  It is not intended as medical advice for individual conditions or treatments  Talk to your doctor, nurse or pharmacist before following any medical regimen to see if it is safe and effective for you        10% - bad control"> 10% - bad control,Hemoglobin A1c (HbA1c) greater than 10% indicating poor diabetic control,Haemoglobin A1c greater than 10% indicating poor diabetic control">   Diabetes Mellitus Type 2 in Adults, Ambulatory Care   GENERAL INFORMATION:   Diabetes mellitus type 2  is a disease that affects how your body uses glucose (sugar)  Insulin helps move sugar out of the blood so it can be used for energy  Normally, when the blood sugar level increases, the pancreas makes more insulin  Type 2 diabetes develops because either the body cannot make enough insulin, or it cannot use the insulin correctly  After many years, your pancreas may stop making insulin  Common symptoms include the following:   · More hunger or thirst than usual     · Frequent urination     · Weight loss without trying     · Blurred vision  Seek immediate care for the following symptoms:   · Severe abdominal pain, or pain that spreads to your back  You may also be vomiting  · Trouble staying awake or focusing    · Shaking or sweating    · Blurred or double vision    · Breath has a fruity, sweet smell    · Breathing is deep and labored, or rapid and shallow    · Heartbeat is fast and weak  Treatment for diabetes mellitus type 2  includes keeping your blood sugar at a normal level  You must eat the right foods, and exercise regularly  You may also need medicine if you cannot control your blood sugar level with nutrition and exercise  Manage diabetes mellitus type 2:   · Check your blood sugar level  You will be taught how to check a small drop of blood in a glucose monitor  Ask your healthcare provider when and how often to check during the day  Ask your healthcare provider what your blood sugar levels should be when you check them  · Keep track of carbohydrates (sugar and starchy foods)  Your blood sugar level can get too high if you eat too many carbohydrates  Your dietitian will help you plan meals and snacks that have the right amount of carbohydrates  · Eat low-fat foods  Some examples are skinless chicken and low-fat milk  · Eat less sodium (salt)  Some examples of high-sodium foods to limit are soy sauce, potato chips, and soup  Do not add salt to food you cook   Limit your use of table salt     · Eat high-fiber foods  Foods that are a good source of fiber include vegetables, whole grain bread, and beans  · Limit alcohol  Alcohol affects your blood sugar level and can make it harder to manage your diabetes  Women should limit alcohol to 1 drink a day  Men should limit alcohol to 2 drinks a day  A drink of alcohol is 12 ounces of beer, 5 ounces of wine, or 1½ ounces of liquor  · Get regular exercise  Exercise can help keep your blood sugar level steady, decrease your risk of heart disease, and help you lose weight  Exercise for at least 30 minutes, 5 days a week  Include muscle strengthening activities 2 days each week  Work with your healthcare provider to create an exercise plan  · Check your feet each day  for injuries or open sores  Ask your healthcare provider for activities you can do if you have an open sore  · Quit smoking  If you smoke, it is never too late to quit  Smoking can worsen the problems that may occur with diabetes  Ask your healthcare provider for information about how to stop smoking if you are having trouble quitting  · Ask about your weight:  Ask healthcare providers if you need to lose weight, and how much to lose  Ask them to help you with a weight loss program  Even a 10 to 15 pound weight loss can help you manage your blood sugar level  · Carry medical alert identification  Wear medical alert jewelry or carry a card that says you have diabetes  Ask your healthcare provider where to get these items  · Ask about vaccines  Diabetes puts you at risk of serious illness if you get the flu, pneumonia, or hepatitis  Ask your healthcare provider if you should get a flu, pneumonia, or hepatitis B vaccine, and when to get the vaccine  Follow up with your healthcare provider as directed:  Write down your questions so you remember to ask them during your visits  CARE AGREEMENT:   You have the right to help plan your care   Learn about your health condition and how it may be treated  Discuss treatment options with your caregivers to decide what care you want to receive  You always have the right to refuse treatment  The above information is an  only  It is not intended as medical advice for individual conditions or treatments  Talk to your doctor, nurse or pharmacist before following any medical regimen to see if it is safe and effective for you  © 2014 [address] is for End User's use only and may not be sold, redistributed or otherwise used for commercial purposes  All illustrations and images included in CareNotes® are the copyrighted property of A D A M , Inc  or Mauricio Hicks

## 2022-03-12 NOTE — DISCHARGE SUMMARY
[facility]  Discharge- Diana Sensor Sr  1970, 54 y o  male MRN: [id number]  Unit/Bed#: ICCU 205-01 Encounter: [id number]  Primary Care Provider: Cassy Romero MD   Date and time admitted to hospital: 3/10/2022  5:24 PM    * Diabetic ketoacidosis without coma associated with type 2 diabetes mellitus [facility])  Assessment & Plan  Lab Results   Component Value Date    HGBA1C >14 0 (H) 03/08/2022       Recent Labs     03/11/22  1240 03/11/22  1717 03/11/22  2145 03/12/22  0525   POCGLU 289* 295* 317* 221*       Blood Sugar Average: Last 72 hrs:  (P) 840 [id number]    Type 2 diabetes mellitus ([facility] [address] )  Assessment & Plan  Lab Results   Component Value Date    HGBA1C >14 0 (H) 03/08/2022       Recent Labs     03/11/22  1240 03/11/22  1717 03/11/22  2145 03/12/22  0525   POCGLU 289* 295* 317* 221*       Blood Sugar Average: Last 72 hrs:  (P) 967 [id number]      Medical Problems             Resolved Problems  Date Reviewed: 3/12/2022          Resolved    Hyponatremia 3/11/2022     Resolved by  Angeline Howard MD              Discharging Physician / Practitioner: DEMETRIO Hensley  PCP: Cassy Romero MD  Admission Date:   Admission Orders (From admission, onward)     Ordered        03/10/22 1729  Inpatient Admission  Once                      Discharge Date: 03/12/22    Consultations During Hospital Stay:  · Endocrinology - Dr Seferino Mccray     Procedures Performed:   · Chest xray portable - Suboptimal inspiration with mild bibasilar opacities again noted without change  These may be related to the patient's known recent Covid 19 pneumonia and/or sequelae thereof      Significant Findings / Test Results:   · See above     Incidental Findings:   · none     Test Results Pending at Discharge (will require follow up):   · Zinc transporter 8 - miscellaneous test pending   · c-peptide in process  · Insulin antibody  · Anti-islet cell antibody   · glutamic acid decarboxylase      Outpatient Tests Requested:  · Call to schedule follow up within the next week   · Call endocrinology to schedule follow up with them at soonest available appointment      Complications: Insurance coverage on fast acting insulin at discharge  Reason for Admission: elevated blood sugars at the pcp office     Hospital Course:   Kiesha Verde  is a 54 y o  male patient who originally presented to the hospital on 3/10/2022 due to elevated blood sugars at pcp office  Patient has history of hypertension, obesity and very recent diagnosis of type 2 diabetes  Patient presented to [facility] Emergency Department at the request of his primary care physician due to elevated blood sugar by glucometer  Upon arrival to the emergency room the patient's initial blood glucose was greater than 700 with positive beta hydroxybutyrate and anion gap metabolic acidosis  Patient was initially admitted to critical care with presentation consistent of diabetic ketoacidosis  Patient was initially placed on insulin drip and transferred to [facility]  Patient reports that he was recently diagnosed with diabetes per his PCP was provided with samples of Tresiba  However when at home his blood glucose monitor was returning unreadable levels  At the urging of his provider via phone call he presented to the [facility] in [facility]  Patient reported that he was having extreme symptoms of polydipsia, polyphagia polyuria  Upon and admission patient's vitals were normal limits his blood glucose was 746 A1c was noted to be greater than 14%  Patient was noted to be in diabetic ketoacidosis started on insulin drip for which patient's blood sugars improved eventually patient was transferred to the floor with consultation per Endocrinology    Patient's blood sugars were much better controlled at discharge discussion was had with the endocrinology team and they were [facility] dosing to Lantus  Patient was also placed on fast acting insulin 10 units t i d  with meals  Patient was eager to leave on 03/12/2022 discussion was had with the patient as scripted already been called through by his PCP to [facility] pharmacy for which I called to increase the dosing  However when attempting to order fast acting insulin patient's insurance co-pay was greater than 200 dollars  After further discussion with his wife she was at insistent on the calling [facility] for the fast acting insulin  After discussion with [facility] they reported that patient could utilize over-the-counter insulin however they will direct the patient once he arrives  The [facility] pharmacy at this point upon my calling in leaving script for fast acting insulin did not have patient's insurance on file and could only further process patient's order once insurance was on file  This was called to the patient's wife who was rather frustrated and seemed overwhelmed  She was however very adamant the script sent so that the patient could be discharged home  The patient reported that he was educated on diet by dietitian and various people had educated him on insulin and administration  Patient was instructed to call Endocrinology to further assist outpatient and to follow up with  As well as call his PCP for further follow-up visit at soonest available appointment  Also reminded for patient to follow-up with vascular based on 12/16/2021 CTA report of 4 1 cm ectasia of the ascending aorta - follow-up should occur within 12 months of said CT scan        Please see above list of diagnoses and related plan for additional information  Condition at Discharge: good    Discharge Day Visit / Exam:   Subjective:  Patient laying on bed eager to be discharged  Reports that he is feeling well he understands what happened and understands how to manage his insulin as well as diet    He at this time reports that I should call his scripts to his [facility] pharmacy  Noted no nausea vomiting or diarrhea and denied any excessive water intake at this current time  Vitals: Blood Pressure: 111/84 (03/12/22 0848)  Pulse: 93 (03/12/22 0848)  Temperature: 98 °F (36 7 °C) (03/12/22 0848)  Temp Source: Oral (03/12/22 0848)  Respirations: 18 (03/12/22 0848)  Height: 5' 9" (175 3 cm) (03/10/22 1725)  Weight - Scale: 118 kg (260 lb 5 8 oz) (03/12/22 0532)  SpO2: 97 % (03/11/22 1953)  Exam:   Physical Exam  Constitutional:       General: He is not in acute distress  Appearance: He is obese  He is not ill-appearing, toxic-appearing or diaphoretic  HENT:      Head: Normocephalic and atraumatic  Eyes:      General:         Right eye: No discharge  Left eye: No discharge  Conjunctiva/sclera: Conjunctivae normal    Cardiovascular:      Rate and Rhythm: Normal rate  Heart sounds: No murmur heard  No friction rub  No gallop  Pulmonary:      Effort: No respiratory distress  Breath sounds: No stridor  No wheezing, rhonchi or rales  Chest:      Chest wall: No tenderness  Abdominal:      General: There is no distension  Palpations: There is no mass  Tenderness: There is no abdominal tenderness  There is no guarding or rebound  Hernia: No hernia is present  Musculoskeletal:         General: No swelling, tenderness, deformity or signs of injury  Right lower leg: No edema  Left lower leg: No edema  Skin:     Coloration: Skin is not jaundiced or pale  Findings: No bruising, erythema, lesion or rash  Neurological:      Mental Status: He is alert and oriented to person, place, and time  Psychiatric:         Behavior: Behavior normal         Discussion with Family: Updated  (wife) via phone  Discharge instructions/Information to patient and family:   See after visit summary for information provided to patient and family        Provisions for Follow-Up Care:  See after visit summary for information related to follow-up care and any pertinent home health orders  Disposition:   Home    Planned Readmission: no plan for readmission      Discharge Statement:  I spent 60 minutes discharging the patient  This time was spent on the day of discharge  I had direct contact with the patient on the day of discharge  Greater than 50% of the total time was spent examining patient, answering all patient questions, arranging and discussing plan of care with patient as well as directly providing post-discharge instructions  Additional time then spent on discharge activities  Discharge Medications:  See after visit summary for reconciled discharge medications provided to patient and/or family        **Please Note: This note may have been constructed using a voice recognition system**

## 2022-03-12 NOTE — UTILIZATION REVIEW
Continued Stay Review    Date: 3/12/2022                       Current Patient Class: inpatient  Current Level of Care:   HPI:51 y o  male initially admitted on 3/10 with DKA  Assessment/Plan: Endocrine consult -- 1  He most likely has type 2 diabetes  Rule out type 1 diabetes by checking antibodies  He did not have DKA since his venous pH was in the normal range  Start Lantus 30 units at bedtime and Humalog 10 units with meals  Monitor blood sugars over time and make adjustments to the regimen if necessary        Vital Signs:   Date/Time Temp Pulse Resp BP MAP (mmHg) SpO2 O2 Device   03/12/22 0848 98 °F (36 7 °C) 93 18 111/84 -- -- --   03/11/22 1953 98 4 °F (36 9 °C) 103 20 124/82 95 97 % None (Room air)   03/11/22 1308 98 7 °F (37 1 °C) 98 15 117/84 94 96 % None (Room air)   03/11/22 0800 -- 92 39 Abnormal  115/88 97 98 % --   03/11/22 0700 98 1 °F (36 7 °C) -- -- -- -- -- --   03/11/22 0615 -- 90 -- 123/84 98 99 % --       Pertinent Labs/Diagnostic Results:     Results from last 7 days   Lab Units 03/12/22  0519 03/11/22  0606 03/10/22  2346 03/10/22  1802 03/10/22  1337   SODIUM mmol/L 136 136 140 139 126*   POTASSIUM mmol/L 3 4* 3 5 3 2* 3 1* 3 8   CHLORIDE mmol/L 106 105 109* 108 88*   CO2 mmol/L 26 22 23 24 20*   ANION GAP mmol/L 4 9 8 7 18*   BUN mg/dL 12 11 15 12 19   CREATININE mg/dL 0 94 0 88 0 91 0 98 1 22   EGFR ml/min/1 73sq m 94 100 98 90 69   CALCIUM mg/dL 8 7 8 2* 8 8 8 8 9 7   MAGNESIUM mg/dL 1 8 1 6  --  1 7  --    PHOSPHORUS mg/dL 4 0 4 0  --  2 9  --      Results from last 7 days   Lab Units 03/12/22  0519 03/10/22  1337   AST U/L 20 13   ALT U/L 25 18   ALK PHOS U/L 88 129*   TOTAL PROTEIN g/dL 6 1* 7 6   ALBUMIN g/dL 2 7* 4 1   TOTAL BILIRUBIN mg/dL 0 47 0 63     Results from last 7 days   Lab Units 03/12/22  0525 03/11/22  2145 03/11/22  1717 03/11/22  1240 03/11/22  0757 03/11/22  0610 03/11/22  0104 03/10/22  2351 03/10/22  2209 03/10/22  2102 03/10/22  2002 03/10/22  1906   POC GLUCOSE mg/dl 221* 317* 295* 289* 228* 228* 138 139 160* 150* 140 160*     Results from last 7 days   Lab Units 03/12/22  0519 03/11/22  0606 03/10/22  2346 03/10/22  1802 03/10/22  1337   GLUCOSE RANDOM mg/dL 221* 223* 138 218* 746*     Medications:   Scheduled Medications:  amLODIPine, 5 mg, Oral, Daily  enoxaparin, 40 mg, Subcutaneous, Daily  insulin glargine, 30 Units, Subcutaneous, HS  insulin lispro, 1-6 Units, Subcutaneous, HS  insulin lispro, 10 Units, Subcutaneous, TID With Meals  insulin lispro, 2-12 Units, Subcutaneous, TID AC  melatonin, 6 mg, Oral, HS    Discharge Plan: MOUSTAPHA Harley RN  Network Utilization Review Department  ATTENTION: Please call with any questions or concerns to [phone] and carefully listen to the prompts so that you are directed to the right person  All voicemails are confidential   Farrel Bodily all requests for admission clinical reviews, approved or denied determinations and any other requests to dedicated fax number below belonging to the campus where the patient is receiving treatment   List of dedicated fax numbers for the Facilities:  [address] [place] [address] DENIALS (Administrative/Medical Necessity) [phone]   [address] (Maternity/NICU/Pediatrics) [phone]   [address]  [address] [facility] [place] [address] [id number] [place] [address] [address] [facility] [place] [address] [phone]

## 2022-03-12 NOTE — QUICK NOTE
Naseem Nagel is a 54y o  year old male with newly diagnosed type 2 diabetes uncontrolled with the A1c of 14%, admitted for hyperglycemia  Type 2 diabetes with hyperglycemia  Uncontrolled with the most recent A1c of 14%, newly diagnosed,  Is complicated with? DKA  Currently on Lantus 30 units q h s  And Humalog 10 units t i d  A c   Patient's blood sugar are above goal for which we increased Lantus to 36 units q h s  And will continue Humalog with current dose  Continue correctional insulin  Continue to monitor blood sugar over time and make adjustments to the regimen if necessary

## 2022-03-12 NOTE — ASSESSMENT & PLAN NOTE
Lab Results   Component Value Date    HGBA1C >14 0 (H) 03/08/2022       Recent Labs     03/11/22  1240 03/11/22  1717 03/11/22  2145 03/12/22  0525   POCGLU 289* 295* 317* 221*       Blood Sugar Average: Last 72 hrs:  (P) 042 [id number]

## 2022-03-12 NOTE — ASSESSMENT & PLAN NOTE
Lab Results   Component Value Date    HGBA1C >14 0 (H) 03/08/2022       Recent Labs     03/11/22  1240 03/11/22  1717 03/11/22  2145 03/12/22  0525   POCGLU 289* 295* 317* 221*       Blood Sugar Average: Last 72 hrs:  (P) 699 [id number]

## 2022-03-12 NOTE — CASE MANAGEMENT
Case Management Discharge Planning Note    Patient name Joycie Nyhan  Location [facility] 205/[facility] 684-90 MRN [id number]  : 1970 Date 3/12/2022       Current Admission Date: 3/10/2022  Current Admission Diagnosis:Diabetic ketoacidosis without coma associated with type 2 diabetes mellitus [facility])   Patient Active Problem List    Diagnosis Date Noted    Hyperlipidemia 2022    Hypokalemia 2022    Diabetic ketoacidosis without coma associated with type 2 diabetes mellitus ([facility] ) 03/10/2022    Type 2 diabetes mellitus ([facility] ) 2022    Polydipsia 2022    Abnormal CXR 2022    Pneumonia due to COVID-19 virus 2022    CKD (chronic kidney disease) stage 3, GFR 30-59 ml/min ([facility] ) 2021    [facility] hypoxemic respiratory failure due to [facility]19 [facility] ) 2021    Class 3 severe obesity in adult [facility]) 2021    Hypertension 2021    Thoracic aortic aneurysm without rupture ([facility] ) 2021      LOS (days): 2  Geometric Mean LOS (GMLOS) (days):   Days to GMLOS:     OBJECTIVE:  Risk of Unplanned Readmission Score: 13         Current admission status: Inpatient   Preferred Pharmacy:   [facility] [address], [address] [facility]  [address] [facility] [address]  Phone: [phone] Fax: [phone]    Primary Care Provider: Jose Carlson MD    Primary Insurance: BLUE CROSS  Secondary Insurance:     DISCHARGE DETAILS:    Discharge planning discussed with[de-identified] Pt reviewed with medical team, per medical team, pt is medically stable for discharged pending medication price check verified  Pt to discharged home with no need  [facility]  Pharmacy unable to price check due do not have prescription plan on file  Cm met with pt and spouse at bed side, reported pt has Елена Sampson at home and script at [facility], however, pt new to [facility] 11  Spouse also expressed preferred medication sent to [facility]   [facility] at [facility] for $25 and Syringe-needle $3  Pt's spouse made aware of medication cost and verbalized understanding and will like prescription sent to [facility]    [place] of Choice: Yes

## 2022-03-13 LAB — C PEPTIDE SERPL-MCNC: 1.6 NG/ML (ref 1.1–4.4)

## 2022-03-14 ENCOUNTER — TRANSITIONAL CARE MANAGEMENT (OUTPATIENT)
Dept: FAMILY MEDICINE CLINIC | Facility: CLINIC | Age: 52
End: 2022-03-14

## 2022-03-14 DIAGNOSIS — E09.9 STEROID-INDUCED DIABETES MELLITUS, INITIAL ENCOUNTER (HCC): ICD-10-CM

## 2022-03-14 DIAGNOSIS — T38.0X5A STEROID-INDUCED DIABETES MELLITUS, INITIAL ENCOUNTER (HCC): ICD-10-CM

## 2022-03-14 LAB — PANC ISLET CELL AB TITR SER: NEGATIVE {TITER}

## 2022-03-14 RX ORDER — INSULIN DEGLUDEC INJECTION 100 U/ML
36 INJECTION, SOLUTION SUBCUTANEOUS
Qty: 15 ML | Refills: 5 | Status: SHIPPED | OUTPATIENT
Start: 2022-03-14 | End: 2022-04-06

## 2022-03-15 ENCOUNTER — OFFICE VISIT (OUTPATIENT)
Dept: FAMILY MEDICINE CLINIC | Facility: CLINIC | Age: 52
End: 2022-03-15
Payer: COMMERCIAL

## 2022-03-15 VITALS
HEART RATE: 103 BPM | RESPIRATION RATE: 16 BRPM | DIASTOLIC BLOOD PRESSURE: 86 MMHG | BODY MASS INDEX: 37.69 KG/M2 | TEMPERATURE: 96.9 F | SYSTOLIC BLOOD PRESSURE: 120 MMHG | HEIGHT: 69 IN | OXYGEN SATURATION: 96 % | WEIGHT: 254.5 LBS

## 2022-03-15 DIAGNOSIS — E66.01 CLASS 3 SEVERE OBESITY DUE TO EXCESS CALORIES WITH SERIOUS COMORBIDITY AND BODY MASS INDEX (BMI) OF 40.0 TO 44.9 IN ADULT (HCC): Chronic | ICD-10-CM

## 2022-03-15 DIAGNOSIS — I10 HYPERTENSION, UNSPECIFIED TYPE: Chronic | ICD-10-CM

## 2022-03-15 DIAGNOSIS — E11.9 TYPE 2 DIABETES MELLITUS WITHOUT COMPLICATION, UNSPECIFIED WHETHER LONG TERM INSULIN USE (HCC): ICD-10-CM

## 2022-03-15 DIAGNOSIS — Z76.89 ENCOUNTER FOR SUPPORT AND COORDINATION OF TRANSITION OF CARE: Primary | ICD-10-CM

## 2022-03-15 DIAGNOSIS — E11.10 DIABETIC KETOACIDOSIS WITHOUT COMA ASSOCIATED WITH TYPE 2 DIABETES MELLITUS (HCC): ICD-10-CM

## 2022-03-15 LAB — GAD65 AB SER-ACNC: <5 U/ML (ref 0–5)

## 2022-03-15 PROCEDURE — 99496 TRANSJ CARE MGMT HIGH F2F 7D: CPT | Performed by: INTERNAL MEDICINE

## 2022-03-15 PROCEDURE — 3008F BODY MASS INDEX DOCD: CPT | Performed by: INTERNAL MEDICINE

## 2022-03-15 NOTE — ASSESSMENT & PLAN NOTE
BP decently controlled, will continue meds, and try to watch diet, sodium in diet, get a little bit of dialy exercise

## 2022-03-15 NOTE — PROGRESS NOTES
TCM Call (since 2/12/2022)     Date and time call was made  3/14/2022  9:43 AM    Hospital care reviewed  Records reviewed        Patient was hospitialized at  [facility]        Date of Admission  03/10/22    Date of discharge  03/12/22    Diagnosis  Diabetic ketoacidosis without coma associated with type 2 diabetes mellitus    Disposition  Home    Were the patients medications reviewed and updated  Yes    Current Symptoms  None      TCM Call (since 2/12/2022)     Post hospital issues  None    Should patient be enrolled in anticoag monitoring? No    Scheduled for follow up? Yes    Patients specialists  Endocrinologist  Appt 4/6/22     Endocrinologist contact #  [phone]    Referrals needed  Ambulatory referral already done     Did you obtain your prescribed medications  Yes    Do you need help managing your prescriptions or medications  No    Is transportation to your appointment needed  No    I have advised the patient to call PCP with any new or worsening symptoms  [address] or [facility] other    Support System  Family    The type of support provided  Emotional; Physical    Do you have social support  Yes, as much as I need    Are you recieving any outpatient services  No    Are you recieving home care services  No    Are you using any community resources  No    Current waiver services  No    Have you fallen in the last 12 months  No    Interperter language line needed  No    Counseling  Patient    Counseling topics  Diagnostic results; Prognosis; instructions for management; patient and family education;  Importance of RX compliance        Assessment/Plan:    Type 2 diabetes mellitus ([address] )    Lab Results   Component Value Date    HGBA1C >14 0 (H) 03/08/2022   Just out of the hospital for severe hyperglycemia, received IVF and IV insulin-transitioned to SQ Tresiba insulin, along with mealtime 10 units Relion R-his thirst is much improved since he was in the hospital, and he has an appointment with the dietician at [facility] on Sunday-is going to see the eye dr as well , especially since he's having blurred vision-sees Endocrine in April-advised to continue checking his sugars regularly-it's possible that if he watches diet, and the longer he is away from the steroids he'd been on for covid that his sugars will come down    Hypertension  BP decently controlled, will continue meds, and try to watch diet, sodium in diet, get a little bit of dialy exercise       Diagnoses and all orders for this visit:    Encounter for support and coordination of transition of care    Type 2 diabetes mellitus without complication, unspecified whether long term insulin use (HCC)    BMI 37 0-37 9, adult    Diabetic ketoacidosis without coma associated with type 2 diabetes mellitus ([facility] )    Class 3 severe obesity due to excess calories with serious comorbidity and body mass index (BMI) of 40 0 to 44 9 in adult [facility])    Hypertension, unspecified type          Subjective:      Patient ID: Alicia Patient  is a 54 y o  male  Arminsri Chen here for follow up-has hx of obesity, HTN, and had covid/covid pneumonitis back in Dec, came home on oxygen and massive steroid dosages-since then, it was noticed at home that he was always thirsty, sometimes going through a case of water a day, and had polyuria as well-A1c was found to be 14 %-tried on Tresiba insulin, diet and metformin but sugars remained high-pt advised to go to hospital for IVF, IV insulin, monitoring and was ultimately admitted to [facility] on 36 units of Tresiba daily and Reli on R TID 10 units with meals  Sugars today this am were in the 150s-      The following portions of the patient's history were reviewed and updated as appropriate: current medications, past family history, past medical history, past social history and problem list     Review of Systems   Constitutional: Negative  HENT: Negative      Eyes: Positive for visual disturbance  Respiratory: Negative  Cardiovascular: Negative  Gastrointestinal: Negative  Musculoskeletal: Negative  Hematological: Negative  Objective:      /86 (BP Location: Left arm, Patient Position: Sitting, Cuff Size: Large)   Pulse 103   Temp (!) 96 9 °F (36 1 °C) (Temporal)   Resp 16   Ht 5' 9" (1 753 m)   Wt 115 kg (254 lb 8 oz)   SpO2 96% Comment: Room air at rest  BMI 37 58 kg/m²          Physical Exam  Constitutional:       Appearance: He is obese  HENT:      Head: Normocephalic and atraumatic  Right Ear: External ear normal       Left Ear: External ear normal       Mouth/Throat:      Mouth: Mucous membranes are moist    Cardiovascular:      Pulses: no weak pulses          Dorsalis pedis pulses are 2+ on the right side and 2+ on the left side  Posterior tibial pulses are 2+ on the right side and 2+ on the left side  Pulmonary:      Effort: Pulmonary effort is normal    Musculoskeletal:      Right lower leg: No edema  Left lower leg: No edema  Feet:      Right foot:      Skin integrity: No ulcer, skin breakdown, erythema, warmth, callus or dry skin  Left foot:      Skin integrity: No ulcer, skin breakdown, erythema, warmth, callus or dry skin  Neurological:      General: No focal deficit present  Mental Status: He is alert and oriented to person, place, and time  Psychiatric:         Mood and Affect: Mood normal        Patient's shoes and socks removed  Right Foot/Ankle   Right Foot Inspection  Skin Exam: skin normal and skin intact  No dry skin, no warmth, no callus, no erythema, no maceration, no abnormal color, no pre-ulcer, no ulcer and no callus  Toe Exam: ROM and strength within normal limits  Sensory   Proprioception: intact  Monofilament testing: intact    Vascular  Capillary refills: < 3 seconds  The right DP pulse is 2+  The right PT pulse is 2+       Left Foot/Ankle  Left Foot Inspection  Skin Exam: skin normal and skin intact  No dry skin, no warmth, no erythema, no maceration, normal color, no pre-ulcer, no ulcer and no callus  Toe Exam: ROM and strength within normal limits  Sensory   Proprioception: intact  Monofilament testing: intact    Vascular  Capillary refills: < 3 seconds  The left DP pulse is 2+  The left PT pulse is 2+       Assign Risk Category  No deformity present  No loss of protective sensation  No weak pulses  Risk: 0

## 2022-03-15 NOTE — ASSESSMENT & PLAN NOTE
Lab Results   Component Value Date    HGBA1C >14 0 (H) 03/08/2022   Just out of the hospital for severe hyperglycemia, received IVF and IV insulin-transitioned to SQ Tresiba insulin, along with mealtime 10 units Relion R-his thirst is much improved since he was in the hospital, and he has an appointment with the dietician at [facility] on Sunday-is going to see the eye dr as well , especially since he's having blurred vision-sees Endocrine in April-advised to continue checking his sugars regularly-it's possible that if he watches diet, and the longer he is away from the steroids he'd been on for covid that his sugars will come down

## 2022-03-21 LAB — INSULIN AB SER-ACNC: <5 UU/ML

## 2022-03-29 ENCOUNTER — CLINICAL SUPPORT (OUTPATIENT)
Dept: FAMILY MEDICINE CLINIC | Facility: CLINIC | Age: 52
End: 2022-03-29

## 2022-03-29 DIAGNOSIS — Z23 COVID-19 VACCINE ADMINISTERED: Primary | ICD-10-CM

## 2022-03-29 PROCEDURE — 91305 PR SARSCOV2 VACCINE 30MCG/0.3ML TRIS-SUCROSE IM USE: CPT

## 2022-04-06 ENCOUNTER — OFFICE VISIT (OUTPATIENT)
Dept: ENDOCRINOLOGY | Facility: CLINIC | Age: 52
End: 2022-04-06
Payer: COMMERCIAL

## 2022-04-06 VITALS
SYSTOLIC BLOOD PRESSURE: 120 MMHG | DIASTOLIC BLOOD PRESSURE: 82 MMHG | HEIGHT: 69 IN | BODY MASS INDEX: 38.8 KG/M2 | WEIGHT: 262 LBS | HEART RATE: 101 BPM

## 2022-04-06 DIAGNOSIS — E08.9 DIABETES MELLITUS DUE TO UNDERLYING CONDITION WITHOUT COMPLICATION, UNSPECIFIED WHETHER LONG TERM INSULIN USE (HCC): ICD-10-CM

## 2022-04-06 DIAGNOSIS — E66.9 CLASS 2 OBESITY WITHOUT SERIOUS COMORBIDITY WITH BODY MASS INDEX (BMI) OF 38.0 TO 38.9 IN ADULT, UNSPECIFIED OBESITY TYPE: ICD-10-CM

## 2022-04-06 DIAGNOSIS — E11.9 TYPE 2 DIABETES MELLITUS WITHOUT COMPLICATION, WITHOUT LONG-TERM CURRENT USE OF INSULIN (HCC): Primary | ICD-10-CM

## 2022-04-06 DIAGNOSIS — E78.2 MIXED HYPERLIPIDEMIA: ICD-10-CM

## 2022-04-06 DIAGNOSIS — E09.9 STEROID-INDUCED DIABETES MELLITUS, INITIAL ENCOUNTER (HCC): ICD-10-CM

## 2022-04-06 DIAGNOSIS — T38.0X5A STEROID-INDUCED DIABETES MELLITUS, INITIAL ENCOUNTER (HCC): ICD-10-CM

## 2022-04-06 DIAGNOSIS — I10 PRIMARY HYPERTENSION: ICD-10-CM

## 2022-04-06 PROBLEM — E66.812 CLASS 2 OBESITY WITHOUT SERIOUS COMORBIDITY WITH BODY MASS INDEX (BMI) OF 38.0 TO 38.9 IN ADULT: Status: ACTIVE | Noted: 2021-12-14

## 2022-04-06 PROCEDURE — 1036F TOBACCO NON-USER: CPT | Performed by: INTERNAL MEDICINE

## 2022-04-06 PROCEDURE — 3074F SYST BP LT 130 MM HG: CPT | Performed by: INTERNAL MEDICINE

## 2022-04-06 PROCEDURE — 99205 OFFICE O/P NEW HI 60 MIN: CPT | Performed by: INTERNAL MEDICINE

## 2022-04-06 PROCEDURE — 3079F DIAST BP 80-89 MM HG: CPT | Performed by: INTERNAL MEDICINE

## 2022-04-06 PROCEDURE — 3008F BODY MASS INDEX DOCD: CPT | Performed by: INTERNAL MEDICINE

## 2022-04-06 NOTE — PROGRESS NOTES
Rivka Claude  54 y o  male MRN: [id number]    Encounter: [id number]      Assessment/Plan     1  Newly diagnosed diabetes mellitus on insulin therapy  2  Obesity  A1c  >14%  Has made major lifestyle modifications, is not needing insulin every day, blood sugars are well controlled  Martínez antibody, anti islet antibody negative    Recommend the following at this time  Discontinue standing doses of insulin   Start metformin 500 mg orally twice a day with meals   Sliding scale insulin for hyperglycemia, blood sugars > 180 mg/dl   Patient to send blood sugar log for review in 2-3 weeks     3  Hyperlipidemia  Not on statin therapy  Discussed use of statin medication for primary prevention of cardiovascular, cerebrovascular disease  Will check fasting lipid panel in 3 months, likely start statin in follow-up     4  Hypertension  Blood pressure well control, reports not taking medication every day   Recommend ambulatory blood pressure monitoring, keep a log, follow-up with primary care      CC: Diabetes    History of Present Illness     HPI:  Rivka Claude  is a 54 y o  male presents for a hospital follow-up visit regarding diabetes management  recently admitted to the hospital for symptomatic hyperglycemia, A1c 14%  Was on corticosteroids for COVID 19 pneumonia that was diagnosed in December - completed in January  Also has obesity, hyperlipidemia, hypertension     Last Eye exam:  3/2022- No DR , new glasses     Current regimen:   Tanvir Sins  - has not used in 2 weeks, was using only if BG were above 120 at bedtime   Novolin R 10 units after meals if BG > 120 mg/dl      Eating less carbohydrates than before - less rice, no juice, occasional soda not as much as before   Zero calorie/ zero sugar   Active at work, had been out of work for 4 months since Dec 2021   [facility] - brother with h/o diabetes mellitus   Lost 35 lbs when he was ill, gained 10 lbs back   No numbness/tingling in the hands/ feet   Noticed blurriness in visionbetter with new glasses   No CP/ SOB  No polyuria/ polydipsia     Statin: --  ACE-I/ARB:  --     Home glucose monitoring:  Log will be scanned into chart   Symptoms of hypoglycemia : ---    All other systems were reviewed and are negative      Review of Systems      Historical Information   Past Medical History:   Diagnosis Date    Aneurysm of thoracic aorta (HCC)     Cardiomegaly     HBP (high blood pressure)     Hyperglycemia     Lab test positive for detection of COVID-19 virus 12/12/2021    Low back pain     Meralgia paresthetica, right lower limb     meralgia paresthetica of right leg    Obesity     Pneumonia due to COVID-19 virus 12/16/2021    Bilateral COVID 19 pneumonia    Seasonal allergies     Vitamin D deficiency     Wears glasses      Past Surgical History:   Procedure Laterality Date    NO PAST SURGERIES       Social History   Social History     Substance and Sexual Activity   Alcohol Use Yes    Comment: social     Social History     Substance and Sexual Activity   Drug Use Never     Social History     Tobacco Use   Smoking Status Never Smoker   Smokeless Tobacco Never Used     Family History:   Family History   Problem Relation Age of Onset    Breast cancer Sister     Diabetes Brother     Blindness Mother     No Known Problems Father     Sickle cell trait Son     Stroke Maternal Aunt     Down syndrome Sister        Meds/Allergies   Current Outpatient Medications   Medication Sig Dispense Refill    amLODIPine (NORVASC) 5 mg tablet TAKE 1 TABLET BY MOUTH EVERY DAY 90 tablet 1    Blood Glucose Monitoring Suppl (OneTouch Verio) w/Device KIT Use 2 times a day to check blood sugar 1 kit 0    glucose blood (OneTouch Verio) test strip Use 2 times a day to check blood sugar 100 strip 5    insulin regular (NovoLIN R ReliOn) 100 units/mL injection Inject 0 1 mL (10 Units total) under the skin 3 (three) times a day with meals 3 mL 1    Insulin Syringe-Needle U-100 (BD Insulin Syringe U/F) 31G X 5/16" 0 3 ML MISC For use with insulin  Pharmacy may dispense brand covered by insurance  100 each 0    Lancets (onetouch ultrasoft) lancets Use 2 times a day to check blood sugar 100 each 5    insulin degludec Rheta Braulio FlexTouch) 100 units/mL injection pen Inject 36 Units under the skin daily at bedtime (Patient not taking: Reported on 4/6/2022 ) 15 mL 5     No current facility-administered medications for this visit  No Known Allergies    Objective   Vitals: Blood pressure 120/82, pulse 101, height 5' 9" (1 753 m), weight 119 kg (262 lb)  Physical Exam  Constitutional:       General: He is not in acute distress  Appearance: He is well-developed  He is not diaphoretic  HENT:      Head: Normocephalic and atraumatic  Eyes:      Conjunctiva/sclera: Conjunctivae normal       Pupils: Pupils are equal, round, and reactive to light  Cardiovascular:      Rate and Rhythm: Normal rate and regular rhythm  Heart sounds: Normal heart sounds  No murmur heard  Pulmonary:      Effort: Pulmonary effort is normal  No respiratory distress  Breath sounds: Normal breath sounds  No wheezing  Abdominal:      General: There is no distension  Palpations: Abdomen is soft  Tenderness: There is no abdominal tenderness  There is no guarding  Musculoskeletal:      Cervical back: Normal range of motion and neck supple  Right lower leg: No edema  Left lower leg: No edema  Skin:     General: Skin is warm and dry  Findings: No erythema or rash  Neurological:      Mental Status: He is alert and oriented to person, place, and time  Psychiatric:         Behavior: Behavior normal          Thought Content: Thought content normal          The history was obtained from the review of the chart, patient and family      Lab Results:   Lab Results   Component Value Date/Time    Hemoglobin A1C >14 0 (H) 03/08/2022 12:40 PM    WBC 5 12 03/11/2022 09:53 AM    WBC 4 49 03/11/2022 06:05 AM    WBC 4 53 03/10/2022 01:37 PM    Hemoglobin 13 7 03/11/2022 09:53 AM    Hemoglobin 13 6 03/11/2022 06:05 AM    Hemoglobin 16 6 03/10/2022 01:37 PM    Hematocrit 40 3 03/11/2022 09:53 AM    Hematocrit 38 6 03/11/2022 06:05 AM    Hematocrit 47 3 03/10/2022 01:37 PM    MCV 78 (L) 03/11/2022 09:53 AM    MCV 76 (L) 03/11/2022 06:05 AM    MCV 77 (L) 03/10/2022 01:37 PM    Platelets 908 10/99/9928 09:53 AM    Platelets 438 34/44/1438 06:05 AM    Platelets 214 08/90/5373 01:37 PM    BUN 12 03/12/2022 05:19 AM    BUN 11 03/11/2022 06:06 AM    BUN 15 03/10/2022 11:46 PM    Potassium 3 4 (L) 03/12/2022 05:19 AM    Potassium 3 5 03/11/2022 06:06 AM    Potassium 3 2 (L) 03/10/2022 11:46 PM    Chloride 106 03/12/2022 05:19 AM    Chloride 105 03/11/2022 06:06 AM    Chloride 109 (H) 03/10/2022 11:46 PM    CO2 26 03/12/2022 05:19 AM    CO2 22 03/11/2022 06:06 AM    CO2 23 03/10/2022 11:46 PM    CO2, i-STAT 25 12/21/2021 04:53 AM    Creatinine 0 94 03/12/2022 05:19 AM    Creatinine 0 88 03/11/2022 06:06 AM    Creatinine 0 91 03/10/2022 11:46 PM    AST 20 03/12/2022 05:19 AM    AST 13 03/10/2022 01:37 PM    AST 29 12/27/2021 06:47 AM    ALT 25 03/12/2022 05:19 AM    ALT 18 03/10/2022 01:37 PM     (H) 12/27/2021 06:47 AM    Albumin 2 7 (L) 03/12/2022 05:19 AM    Albumin 4 1 03/10/2022 01:37 PM    Albumin 2 5 (L) 12/27/2021 06:47 AM    HDL, Direct 39 (L) 03/10/2022 01:37 PM    HDL, Direct 46 06/28/2021 07:50 AM    Triglycerides 169 (H) 03/10/2022 01:37 PM    Triglycerides 49 06/28/2021 07:50 AM         Imaging Studies: I have personally reviewed pertinent reports  Portions of the record may have been created with voice recognition software  Occasional wrong word or "sound a like" substitutions may have occurred due to the inherent limitations of voice recognition software  Read the chart carefully and recognize, using context, where substitutions have occurred

## 2022-04-06 NOTE — PATIENT INSTRUCTIONS
Discontinue standing doses of insulin   Start metformin 500 mg orally twice a day with meals     In addition, please use humalog insulin per the following sliding scale to correct high blood sugars:  BG  180-230: 1 unit  231-280: 2 units  281-330: 3 units  331-380: 4 units  > 38: 5 units  Do not correct bed time highs unless > 200 mg/dl    Send BG log for review in 2-3 weeks  Continue healthy lifestyle   Follow-up in 3 months with repeat labs

## 2022-04-15 DIAGNOSIS — T38.0X5A STEROID-INDUCED DIABETES MELLITUS, INITIAL ENCOUNTER (HCC): ICD-10-CM

## 2022-04-15 DIAGNOSIS — E09.9 STEROID-INDUCED DIABETES MELLITUS, INITIAL ENCOUNTER (HCC): ICD-10-CM

## 2022-04-15 RX ORDER — BLOOD SUGAR DIAGNOSTIC
STRIP MISCELLANEOUS
Qty: 200 STRIP | Refills: 5 | Status: SHIPPED | OUTPATIENT
Start: 2022-04-15

## 2022-04-27 ENCOUNTER — TELEPHONE (OUTPATIENT)
Dept: ENDOCRINOLOGY | Facility: CLINIC | Age: 52
End: 2022-04-27

## 2022-04-28 NOTE — TELEPHONE ENCOUNTER
Spoke with patient's wife and reviewed bg log   She understood and will send log in again in 2-3 weeks

## 2022-05-25 ENCOUNTER — TELEPHONE (OUTPATIENT)
Dept: ENDOCRINOLOGY | Facility: CLINIC | Age: 52
End: 2022-05-25

## 2022-05-26 NOTE — TELEPHONE ENCOUNTER
Spoke with patient's wife and reviewed bg log  He is not having low bg's  He will stagger check bg's throughout the day going forward

## 2022-05-26 NOTE — TELEPHONE ENCOUNTER
Reviewed  If patient is not having any symptoms of low/tight blood sugars, recommend Continue current regimen and having labs done next month as previously ordered    Recommend staggered checking blood sugars once a day but at different times before meals and at bedtime

## 2022-06-07 DIAGNOSIS — I10 HYPERTENSION, UNSPECIFIED TYPE: ICD-10-CM

## 2022-06-07 RX ORDER — AMLODIPINE BESYLATE 5 MG/1
5 TABLET ORAL DAILY
Qty: 90 TABLET | Refills: 1 | Status: SHIPPED | OUTPATIENT
Start: 2022-06-07

## 2022-06-15 LAB — MISCELLANEOUS LAB TEST RESULT: NORMAL

## 2022-07-11 ENCOUNTER — OFFICE VISIT (OUTPATIENT)
Dept: FAMILY MEDICINE CLINIC | Facility: CLINIC | Age: 52
End: 2022-07-11
Payer: COMMERCIAL

## 2022-07-11 VITALS
BODY MASS INDEX: 39.78 KG/M2 | DIASTOLIC BLOOD PRESSURE: 100 MMHG | RESPIRATION RATE: 16 BRPM | OXYGEN SATURATION: 97 % | WEIGHT: 268.6 LBS | TEMPERATURE: 97.3 F | HEART RATE: 78 BPM | HEIGHT: 69 IN | SYSTOLIC BLOOD PRESSURE: 130 MMHG

## 2022-07-11 DIAGNOSIS — E11.9 TYPE 2 DIABETES MELLITUS WITHOUT COMPLICATION, UNSPECIFIED WHETHER LONG TERM INSULIN USE (HCC): ICD-10-CM

## 2022-07-11 DIAGNOSIS — Z12.5 PROSTATE CANCER SCREENING: ICD-10-CM

## 2022-07-11 DIAGNOSIS — Z12.11 COLON CANCER SCREENING: ICD-10-CM

## 2022-07-11 DIAGNOSIS — Z28.21 PNEUMOCOCCAL VACCINATION DECLINED: ICD-10-CM

## 2022-07-11 DIAGNOSIS — Z00.00 ANNUAL PHYSICAL EXAM: Primary | ICD-10-CM

## 2022-07-11 DIAGNOSIS — I10 PRIMARY HYPERTENSION: Primary | Chronic | ICD-10-CM

## 2022-07-11 DIAGNOSIS — Z28.21 TETANUS, DIPHTHERIA, AND ACELLULAR PERTUSSIS (TDAP) VACCINATION DECLINED: ICD-10-CM

## 2022-07-11 DIAGNOSIS — Z11.59 NEED FOR HEPATITIS C SCREENING TEST: ICD-10-CM

## 2022-07-11 LAB — SL AMB POCT HEMOGLOBIN AIC: 7 (ref ?–6.5)

## 2022-07-11 PROCEDURE — 3725F SCREEN DEPRESSION PERFORMED: CPT | Performed by: INTERNAL MEDICINE

## 2022-07-11 PROCEDURE — 3051F HG A1C>EQUAL 7.0%<8.0%: CPT | Performed by: INTERNAL MEDICINE

## 2022-07-11 PROCEDURE — 99396 PREV VISIT EST AGE 40-64: CPT | Performed by: INTERNAL MEDICINE

## 2022-07-11 PROCEDURE — 83036 HEMOGLOBIN GLYCOSYLATED A1C: CPT | Performed by: INTERNAL MEDICINE

## 2022-07-11 PROCEDURE — 3075F SYST BP GE 130 - 139MM HG: CPT | Performed by: INTERNAL MEDICINE

## 2022-07-11 PROCEDURE — 3080F DIAST BP >= 90 MM HG: CPT | Performed by: INTERNAL MEDICINE

## 2022-07-11 NOTE — PATIENT INSTRUCTIONS

## 2022-07-11 NOTE — PROGRESS NOTES
[address] [facility]    NAME: Jhonny Garibay Sr   AGE: 50 y o  SEX: male  : 1970     DATE: 2022     Assessment and Plan:     Problem List Items Addressed This Visit        Endocrine    Type 2 diabetes mellitus ([address] )       Lab Results   Component Value Date    HGBA1C 7 0 (A) 2022   Abraham's A1c is 7% today-much better-taking metformin once or bid           Relevant Orders    POCT hemoglobin A1c (Completed)    CBC and differential    Comprehensive metabolic panel    Lipid panel    TSH, 3rd generation    Microalbumin / creatinine urine ratio      Other Visit Diagnoses     Annual physical exam    -  Primary    Relevant Orders    CBC and differential    Comprehensive metabolic panel    Lipid panel    TSH, 3rd generation    Microalbumin / creatinine urine ratio    Need for hepatitis C screening test        Relevant Orders    PSA, Total Screen    Prostate cancer screening        Relevant Orders    Hepatitis C antibody    Colon cancer screening        colonoscopy ordered    Relevant Orders    Ambulatory referral for colonoscopy    Tetanus, diphtheria, and acellular pertussis (Tdap) vaccination declined        Pneumococcal vaccination declined              Immunizations and preventive care screenings were discussed with patient today  Appropriate education was printed on patient's after visit summary  Counseling:  Alcohol/drug use: discussed moderation in alcohol intake, the recommendations for healthy alcohol use, and avoidance of illicit drug use  Dental Health: discussed importance of regular tooth brushing, flossing, and dental visits  · Exercise: the importance of regular exercise/physical activity was discussed  Recommend exercise 3-5 times per week for at least 30 minutes  Depression Screening and Follow-up Plan: Patient was screened for depression during today's encounter   They screened negative with a PHQ-2 score of 0  Return in about 6 months (around 1/11/2023) for Next scheduled follow up  Chief Complaint:     Chief Complaint   Patient presents with    Physical Exam      History of Present Illness:     Adult Annual Physical   Patient here for a comprehensive physical exam  The patient reports no problems  Diet and Physical Activity  · Diet/Nutrition: diabetic diet  · Exercise: no formal exercise  Depression Screening  PHQ-2/9 Depression Screening    Little interest or pleasure in doing things: 0 - not at all  Feeling down, depressed, or hopeless: 0 - not at all  PHQ-2 Score: 0  PHQ-2 Interpretation: Negative depression screen       General Health  · Sleep: sleeps well  · Hearing: normal - bilateral   · Vision: no vision problems  · Dental: regular dental visits   Health  · Symptoms include: none     Review of Systems:     Review of Systems   Constitutional: Negative  HENT: Negative  Respiratory: Negative  Cardiovascular: Negative  Musculoskeletal: Negative  Hematological: Negative  Psychiatric/Behavioral: Negative         Past Medical History:     Past Medical History:   Diagnosis Date    Aneurysm [facility] ([facility] )     Cardiomegaly     Diabetes mellitus ([facility] ) March 2022    HBP (high blood pressure)     Hyperglycemia     Hypertension     Lab test positive for detection of COVID-19 virus 12/12/2021    Low back pain     Meralgia paresthetica, right lower limb     meralgia paresthetica of right leg    Obesity     Pneumonia 12-16-21    COVID    Pneumonia due to COVID-19 virus 12/16/2021    Bilateral COVID 19 pneumonia    Seasonal allergies     Visual impairment March 2022    Vitamin D deficiency     Wears glasses       Past Surgical History:     Past Surgical History:   Procedure Laterality Date    NO PAST SURGERIES        Family History:     Family History   Problem Relation Age of Onset    Breast cancer Sister     Cancer Sister    Mile Lam Diabetes Brother     Blindness Mother     No Known Problems Father     Sickle cell trait Son     Stroke Maternal [de-identified]     Down syndrome Sister       Social History:     Social History     Socioeconomic History    Marital status: /Civil Union     Spouse name: None    Number of children: None    Years of education: None    Highest education level: None   Occupational History    None   Tobacco Use    Smoking status: Never Smoker    Smokeless tobacco: Never Used   Vaping Use    Vaping Use: Never used   Substance and Sexual Activity    Alcohol use: Not Currently     Comment: Nothing in over a month    Drug use: Never    Sexual activity: Yes     Partners: Female   Other Topics Concern    None   Social History Narrative    Most recent tobacco use screenin2019    Do you currently or have you served in the [employer]: No    Were you activated, into active duty, as a member of the [employer] or as a Reservist: No    Marital status:     Sexual orientation: Heterosexual    Exercise level: None    Diet: Regular    Alcohol intake: Moderate    Caffeine intake: None    Chewing tobacco: none    Illicit drugs: none    Guns present in home: No    Seat belts used routinely: Yes    Sunscreen used routinely: No    Smoke alarm in home: Yes    Advance directive: No    Live alone or with others: with others    Are there stairs in your home: Yes    International travel: yes    Pets: No     Social Determinants of Health     Financial Resource Strain: Not on file   Food Insecurity: No Food Insecurity    Worried About Running Out of Food in the Last Year: Never true    Shantel of Food in the Last Year: Never true   Transportation Needs: No Transportation Needs    Lack of Transportation (Medical): No    Lack of Transportation (Non-Medical):  No   Physical Activity: Not on file   Stress: Not on file   Social Connections: Not on file   Intimate Partner Violence: Not on file   Housing Stability: Low Risk     Unable to Pay for Housing in the Last Year: No    Number of Places Lived in the Last Year: 1    Unstable Housing in the Last Year: No      Current Medications:     Current Outpatient Medications   Medication Sig Dispense Refill    amLODIPine (NORVASC) 5 mg tablet Take 1 tablet (5 mg total) by mouth daily 90 tablet 1    Blood Glucose Monitoring Suppl (OneTouch Verio) w/Device KIT Use 2 times a day to check blood sugar 1 kit 0    glucose blood (OneTouch Verio) test strip Use 2 times a day to check blood sugar 200 strip 5    Lancets (onetouch ultrasoft) lancets Use 2 times a day to check blood sugar 100 each 5    metFORMIN (GLUCOPHAGE) 500 mg tablet Take 1 tablet (500 mg total) by mouth 2 (two) times a day with meals 180 tablet 3     No current facility-administered medications for this visit  Allergies:     No Known Allergies   Physical Exam:     /100 (BP Location: Left arm, Patient Position: Sitting, Cuff Size: Large)   Pulse 78   Temp (!) 97 3 °F (36 3 °C) (Temporal)   Resp 16   Ht 5' 8 75" (1 746 m)   Wt 122 kg (268 lb 9 6 oz)   SpO2 97%   BMI 39 95 kg/m²     Physical Exam  Constitutional:       Appearance: He is obese  HENT:      Head: Normocephalic and atraumatic  Right Ear: External ear normal       Left Ear: External ear normal       Nose: Nose normal       Mouth/Throat:      Mouth: Mucous membranes are moist       Pharynx: Oropharynx is clear  Cardiovascular:      Rate and Rhythm: Normal rate and regular rhythm  Heart sounds: Normal heart sounds  Pulmonary:      Effort: Pulmonary effort is normal       Breath sounds: Normal breath sounds  Musculoskeletal:         General: Normal range of motion  Cervical back: Normal range of motion and neck supple  Skin:     General: Skin is warm  Capillary Refill: Capillary refill takes less than 2 seconds  Neurological:      General: No focal deficit present        Mental Status: He is alert and oriented to person, place, and time  Psychiatric:         Mood and Affect: Mood normal          Behavior: Behavior normal          Thought Content:  Thought content normal          Judgment: Judgment normal           Bernard Nuñez MD  [facility]

## 2022-07-11 NOTE — ASSESSMENT & PLAN NOTE
BP running high-advised low sodium diet, monitoring bp and if it doesn't improve will double up on the amlodipine

## 2022-07-11 NOTE — ASSESSMENT & PLAN NOTE
Lab Results   Component Value Date    HGBA1C 7 0 (A) 07/11/2022   Abraham's A1c is 7% today-much better-taking metformin once or bid

## 2022-07-11 NOTE — ASSESSMENT & PLAN NOTE
BP running high today, advised to cut back on salt and get some daily exercise, weight loss-will monitor at home and if remains high will increase amlodipine

## 2022-07-20 DIAGNOSIS — U07.1 COVID-19: Primary | ICD-10-CM

## 2022-08-08 ENCOUNTER — APPOINTMENT (OUTPATIENT)
Dept: LAB | Facility: AMBULARY SURGERY CENTER | Age: 52
End: 2022-08-08
Payer: COMMERCIAL

## 2022-08-08 DIAGNOSIS — Z11.59 NEED FOR HEPATITIS C SCREENING TEST: ICD-10-CM

## 2022-08-08 LAB — PSA SERPL-MCNC: 0.8 NG/ML (ref 0–4)

## 2022-08-08 PROCEDURE — G0103 PSA SCREENING: HCPCS

## 2022-08-08 PROCEDURE — 36415 COLL VENOUS BLD VENIPUNCTURE: CPT

## 2022-08-09 DIAGNOSIS — Z80.42 FAMILY HISTORY OF PROSTATE CANCER: Primary | ICD-10-CM

## 2022-08-29 ENCOUNTER — CLINICAL SUPPORT (OUTPATIENT)
Dept: FAMILY MEDICINE CLINIC | Facility: CLINIC | Age: 52
End: 2022-08-29
Payer: COMMERCIAL

## 2022-08-29 DIAGNOSIS — Z23 COVID-19 VACCINE ADMINISTERED: Primary | ICD-10-CM

## 2022-08-29 PROCEDURE — 0054A PR IMM ADMN SARSCOV2 30MCG/0.3ML TRIS-SUCROSE BST: CPT

## 2022-08-29 PROCEDURE — 91305 PR SARSCOV2 VACCINE 30MCG/0.3ML TRIS-SUCROSE IM USE: CPT

## 2022-09-12 ENCOUNTER — APPOINTMENT (OUTPATIENT)
Dept: LAB | Facility: AMBULARY SURGERY CENTER | Age: 52
End: 2022-09-12
Payer: COMMERCIAL

## 2022-09-12 DIAGNOSIS — E11.9 TYPE 2 DIABETES MELLITUS WITHOUT COMPLICATION, WITHOUT LONG-TERM CURRENT USE OF INSULIN (HCC): ICD-10-CM

## 2022-09-12 DIAGNOSIS — E66.9 CLASS 2 OBESITY WITHOUT SERIOUS COMORBIDITY WITH BODY MASS INDEX (BMI) OF 38.0 TO 38.9 IN ADULT, UNSPECIFIED OBESITY TYPE: ICD-10-CM

## 2022-09-12 DIAGNOSIS — E78.2 MIXED HYPERLIPIDEMIA: ICD-10-CM

## 2022-09-12 LAB
ALBUMIN SERPL BCP-MCNC: 3.3 G/DL (ref 3.5–5)
ALP SERPL-CCNC: 84 U/L (ref 46–116)
ALT SERPL W P-5'-P-CCNC: 33 U/L (ref 12–78)
ANION GAP SERPL CALCULATED.3IONS-SCNC: 4 MMOL/L (ref 4–13)
AST SERPL W P-5'-P-CCNC: 14 U/L (ref 5–45)
BILIRUB SERPL-MCNC: 0.55 MG/DL (ref 0.2–1)
BUN SERPL-MCNC: 15 MG/DL (ref 5–25)
CALCIUM ALBUM COR SERPL-MCNC: 9.7 MG/DL (ref 8.3–10.1)
CALCIUM SERPL-MCNC: 9.1 MG/DL (ref 8.3–10.1)
CHLORIDE SERPL-SCNC: 110 MMOL/L (ref 96–108)
CHOLEST SERPL-MCNC: 152 MG/DL
CO2 SERPL-SCNC: 25 MMOL/L (ref 21–32)
CREAT SERPL-MCNC: 1.1 MG/DL (ref 0.6–1.3)
CREAT UR-MCNC: 106 MG/DL
GFR SERPL CREATININE-BSD FRML MDRD: 77 ML/MIN/1.73SQ M
GLUCOSE P FAST SERPL-MCNC: 105 MG/DL (ref 65–99)
HDLC SERPL-MCNC: 45 MG/DL
LDLC SERPL CALC-MCNC: 96 MG/DL (ref 0–100)
MICROALBUMIN UR-MCNC: 12.1 MG/L (ref 0–20)
MICROALBUMIN/CREAT 24H UR: 11 MG/G CREATININE (ref 0–30)
NONHDLC SERPL-MCNC: 107 MG/DL
POTASSIUM SERPL-SCNC: 4 MMOL/L (ref 3.5–5.3)
PROT SERPL-MCNC: 7.4 G/DL (ref 6.4–8.4)
SODIUM SERPL-SCNC: 139 MMOL/L (ref 135–147)
TRIGL SERPL-MCNC: 54 MG/DL

## 2022-09-12 PROCEDURE — 82043 UR ALBUMIN QUANTITATIVE: CPT

## 2022-09-12 PROCEDURE — 36415 COLL VENOUS BLD VENIPUNCTURE: CPT

## 2022-09-12 PROCEDURE — 80061 LIPID PANEL: CPT

## 2022-09-12 PROCEDURE — 80053 COMPREHEN METABOLIC PANEL: CPT

## 2022-09-12 PROCEDURE — 82570 ASSAY OF URINE CREATININE: CPT

## 2022-10-11 PROBLEM — E11.10 DIABETIC KETOACIDOSIS WITHOUT COMA ASSOCIATED WITH TYPE 2 DIABETES MELLITUS (HCC): Status: RESOLVED | Noted: 2022-03-10 | Resolved: 2022-10-11

## 2022-10-12 PROBLEM — J12.82 PNEUMONIA DUE TO COVID-19 VIRUS: Status: RESOLVED | Noted: 2022-01-14 | Resolved: 2022-10-12

## 2022-10-12 PROBLEM — U07.1 PNEUMONIA DUE TO COVID-19 VIRUS: Status: RESOLVED | Noted: 2022-01-14 | Resolved: 2022-10-12

## 2022-10-24 ENCOUNTER — OFFICE VISIT (OUTPATIENT)
Dept: UROLOGY | Facility: CLINIC | Age: 52
End: 2022-10-24
Payer: COMMERCIAL

## 2022-10-24 VITALS
SYSTOLIC BLOOD PRESSURE: 120 MMHG | OXYGEN SATURATION: 97 % | HEIGHT: 69 IN | DIASTOLIC BLOOD PRESSURE: 80 MMHG | HEART RATE: 87 BPM | BODY MASS INDEX: 41.09 KG/M2 | WEIGHT: 277.4 LBS

## 2022-10-24 DIAGNOSIS — I10 HYPERTENSION, UNSPECIFIED TYPE: ICD-10-CM

## 2022-10-24 DIAGNOSIS — Z12.5 PROSTATE CANCER SCREENING: Primary | ICD-10-CM

## 2022-10-24 LAB
SL AMB  POCT GLUCOSE, UA: NORMAL
SL AMB LEUKOCYTE ESTERASE,UA: NORMAL
SL AMB POCT BILIRUBIN,UA: NORMAL
SL AMB POCT BLOOD,UA: NORMAL
SL AMB POCT CLARITY,UA: CLEAR
SL AMB POCT COLOR,UA: YELLOW
SL AMB POCT KETONES,UA: NORMAL
SL AMB POCT NITRITE,UA: NORMAL
SL AMB POCT PH,UA: 6
SL AMB POCT SPECIFIC GRAVITY,UA: 1020
SL AMB POCT URINE PROTEIN: NORMAL
SL AMB POCT UROBILINOGEN: 0.2

## 2022-10-24 PROCEDURE — 99203 OFFICE O/P NEW LOW 30 MIN: CPT | Performed by: PHYSICIAN ASSISTANT

## 2022-10-24 PROCEDURE — 81002 URINALYSIS NONAUTO W/O SCOPE: CPT | Performed by: PHYSICIAN ASSISTANT

## 2022-10-24 RX ORDER — AMLODIPINE BESYLATE 5 MG/1
5 TABLET ORAL DAILY
Qty: 90 TABLET | Refills: 0 | Status: SHIPPED | OUTPATIENT
Start: 2022-10-24

## 2022-10-24 RX ORDER — AMLODIPINE BESYLATE 5 MG/1
TABLET ORAL
Qty: 90 TABLET | Refills: 0 | Status: SHIPPED | OUTPATIENT
Start: 2022-10-24

## 2022-10-24 NOTE — PROGRESS NOTES
1  Prostate cancer screening  POCT urine dip     Assessment and plan:       1  Prostate cancer screening  2  Prostate cancer screening  -PSA 0 8  -normal digital rectal examination  -follow-up in 1 year with repeat PSA for ongoing surveillance      [id number]      Chief Complaint     Prostate cancer screening    History of Present Illness     Carie Greenberg  is a 50 y o  male presenting today for consultation of prostate cancer screening  Patient's brother was diagnosed metastatic prostate cancer within the past year  Unclear 0 any additional family history of prostate cancer  Patient is overall very comfortable with his lower urinary tract symptoms  Denies any dysuria, gross hematuria, incontinence, or infections  Denies any bone pain or weight loss  No prior  surgical manipulation  PSA 0 8 (8/8/22)    Urine dip leukocyte, nitrite, blood negative  Laboratory     Lab Results   Component Value Date    CREATININE 1 10 09/12/2022       Lab Results   Component Value Date    PSA 0 8 08/08/2022       Review of Systems     Review of Systems   Constitutional: Negative for activity change, appetite change, chills, diaphoresis, fatigue, fever and unexpected weight change  Respiratory: Negative for chest tightness and shortness of breath  Cardiovascular: Negative for chest pain, palpitations and leg swelling  Gastrointestinal: Negative for abdominal distention, abdominal pain, constipation, diarrhea, nausea and vomiting  Genitourinary: Negative for decreased urine volume, difficulty urinating, dysuria, enuresis, flank pain, frequency, genital sores, hematuria and urgency  Musculoskeletal: Negative for back pain, gait problem and myalgias  Skin: Negative for color change, pallor, rash and wound  Psychiatric/Behavioral: Negative for behavioral problems  The patient is not nervous/anxious          Allergies     No Known Allergies    Physical Exam     Physical Exam  Constitutional:       General: He is not in acute distress  Appearance: Normal appearance  He is obese  He is not ill-appearing, toxic-appearing or diaphoretic  HENT:      Head: Normocephalic and atraumatic  Eyes:      General:         Right eye: No discharge  Left eye: No discharge  Conjunctiva/sclera: Conjunctivae normal    Pulmonary:      Effort: Pulmonary effort is normal  No respiratory distress  Genitourinary:     Comments: Good rectal tone  Prostate 30g, smooth, symmetric, no palpable nodules  Musculoskeletal:         General: No swelling or tenderness  Normal range of motion  Skin:     General: Skin is warm and dry  Coloration: Skin is not jaundiced or pale  Neurological:      General: No focal deficit present  Mental Status: He is alert and oriented to person, place, and time  Psychiatric:         Mood and Affect: Mood normal          Behavior: Behavior normal          Vital Signs     There were no vitals filed for this visit        Current Medications       Current Outpatient Medications:   •  amLODIPine (NORVASC) 5 mg tablet, Take 1 tablet (5 mg total) by mouth daily, Disp: 90 tablet, Rfl: 1  •  Blood Glucose Monitoring Suppl (OneTouch Verio) w/Device KIT, Use 2 times a day to check blood sugar, Disp: 1 kit, Rfl: 0  •  glucose blood (OneTouch Verio) test strip, Use 2 times a day to check blood sugar, Disp: 200 strip, Rfl: 5  •  Lancets (onetouch ultrasoft) lancets, Use 2 times a day to check blood sugar, Disp: 100 each, Rfl: 5  •  metFORMIN (GLUCOPHAGE) 500 mg tablet, Take 1 tablet (500 mg total) by mouth 2 (two) times a day with meals, Disp: 180 tablet, Rfl: 3      Active Problems     Patient Active Problem List   Diagnosis   • Hypertension   • Thoracic aortic aneurysm without rupture   • Class 2 obesity without serious comorbidity with body mass index (BMI) of 38 0 to 38 9 in adult   • Acute hypoxemic respiratory failure due to COVID-19 [facility])   • CKD (chronic kidney disease) stage 3, GFR 30-59 ml/min ([facility])   • Abnormal CXR   • Polydipsia   • Type 2 diabetes mellitus ([facility] )   • Hyperlipidemia   • Hypokalemia         Past Medical History     Past Medical History:   Diagnosis Date   • [facility] )    • Cardiomegaly    • Diabetes mellitus ([facility] ) March 2022   • HBP (high blood pressure)    • Hyperglycemia    • Hypertension    • Lab test positive for detection of COVID-19 virus 12/12/2021   • Low back pain    • Meralgia paresthetica, right lower limb     meralgia paresthetica of right leg   • Obesity    • Pneumonia 12-16-21    COVID   • Pneumonia due to COVID-19 virus 12/16/2021    Bilateral COVID 19 pneumonia   • Seasonal allergies    • Visual impairment March 2022   • Vitamin D deficiency    • Wears glasses          Surgical History     Past Surgical History:   Procedure Laterality Date   • NO PAST SURGERIES           Family History     Family History   Problem Relation Age of Onset   • Blindness Mother    • No Known Problems Father    • Diabetes Sister    • Breast cancer Sister    • Down syndrome Sister    • Diabetes Brother    • Glaucoma Brother    • Prostate cancer Brother         Stage 4 Metastatic Prostate Cancer   • Hypertension Brother    • Pulmonary embolism Brother    • Sickle cell trait Son    • Stroke Maternal Aunt          Social History     Social History       Radiology

## 2022-10-31 ENCOUNTER — TELEPHONE (OUTPATIENT)
Dept: PULMONOLOGY | Facility: CLINIC | Age: 52
End: 2022-10-31

## 2022-10-31 NOTE — TELEPHONE ENCOUNTER
Spoke with patients wife to schedule a follow up appointment, first available, with Dr Lucy Fitzpatrick at our [facility]  Wife stated that she will call back to schedule

## 2023-06-01 DIAGNOSIS — Z13.29 THYROID DISORDER SCREENING: ICD-10-CM

## 2023-06-01 DIAGNOSIS — T38.0X5A STEROID-INDUCED DIABETES MELLITUS, INITIAL ENCOUNTER (HCC): ICD-10-CM

## 2023-06-01 DIAGNOSIS — Z11.59 NEED FOR HEPATITIS C SCREENING TEST: ICD-10-CM

## 2023-06-01 DIAGNOSIS — Z13.220 LIPID SCREENING: ICD-10-CM

## 2023-06-01 DIAGNOSIS — E09.9 STEROID-INDUCED DIABETES MELLITUS, INITIAL ENCOUNTER (HCC): ICD-10-CM

## 2023-06-01 DIAGNOSIS — I10 HYPERTENSION, UNSPECIFIED TYPE: Primary | ICD-10-CM

## 2023-06-01 DIAGNOSIS — Z00.00 ANNUAL PHYSICAL EXAM: ICD-10-CM

## 2023-06-24 ENCOUNTER — APPOINTMENT (OUTPATIENT)
Dept: LAB | Facility: HOSPITAL | Age: 53
End: 2023-06-24
Attending: INTERNAL MEDICINE
Payer: COMMERCIAL

## 2023-06-24 DIAGNOSIS — T38.0X5A STEROID-INDUCED DIABETES MELLITUS, INITIAL ENCOUNTER (HCC): ICD-10-CM

## 2023-06-24 DIAGNOSIS — I10 HYPERTENSION, UNSPECIFIED TYPE: ICD-10-CM

## 2023-06-24 DIAGNOSIS — Z11.59 NEED FOR HEPATITIS C SCREENING TEST: ICD-10-CM

## 2023-06-24 DIAGNOSIS — Z00.00 ANNUAL PHYSICAL EXAM: ICD-10-CM

## 2023-06-24 DIAGNOSIS — E09.9 STEROID-INDUCED DIABETES MELLITUS, INITIAL ENCOUNTER (HCC): ICD-10-CM

## 2023-06-24 DIAGNOSIS — Z13.29 THYROID DISORDER SCREENING: ICD-10-CM

## 2023-06-24 DIAGNOSIS — Z13.220 LIPID SCREENING: ICD-10-CM

## 2023-06-24 LAB
ALBUMIN SERPL BCP-MCNC: 3.9 G/DL (ref 3.5–5)
ALP SERPL-CCNC: 75 U/L (ref 34–104)
ALT SERPL W P-5'-P-CCNC: 37 U/L (ref 7–52)
ANION GAP SERPL CALCULATED.3IONS-SCNC: 10 MMOL/L
AST SERPL W P-5'-P-CCNC: 17 U/L (ref 13–39)
BASOPHILS # BLD AUTO: 0.02 THOUSANDS/ÂΜL (ref 0–0.1)
BASOPHILS NFR BLD AUTO: 0 % (ref 0–1)
BILIRUB SERPL-MCNC: 0.47 MG/DL (ref 0.2–1)
BUN SERPL-MCNC: 13 MG/DL (ref 5–25)
CALCIUM SERPL-MCNC: 9.3 MG/DL (ref 8.4–10.2)
CHLORIDE SERPL-SCNC: 103 MMOL/L (ref 96–108)
CHOLEST SERPL-MCNC: 146 MG/DL
CO2 SERPL-SCNC: 26 MMOL/L (ref 21–32)
CREAT SERPL-MCNC: 1.16 MG/DL (ref 0.6–1.3)
CREAT UR-MCNC: 119 MG/DL
EOSINOPHIL # BLD AUTO: 0.36 THOUSAND/ÂΜL (ref 0–0.61)
EOSINOPHIL NFR BLD AUTO: 7 % (ref 0–6)
ERYTHROCYTE [DISTWIDTH] IN BLOOD BY AUTOMATED COUNT: 14 % (ref 11.6–15.1)
EST. AVERAGE GLUCOSE BLD GHB EST-MCNC: 137 MG/DL
GFR SERPL CREATININE-BSD FRML MDRD: 72 ML/MIN/1.73SQ M
GLUCOSE P FAST SERPL-MCNC: 109 MG/DL (ref 65–99)
HBA1C MFR BLD: 6.4 %
HCT VFR BLD AUTO: 48.5 % (ref 36.5–49.3)
HDLC SERPL-MCNC: 37 MG/DL
HGB BLD-MCNC: 16.2 G/DL (ref 12–17)
IMM GRANULOCYTES # BLD AUTO: 0.01 THOUSAND/UL (ref 0–0.2)
IMM GRANULOCYTES NFR BLD AUTO: 0 % (ref 0–2)
LDLC SERPL CALC-MCNC: 100 MG/DL (ref 0–100)
LYMPHOCYTES # BLD AUTO: 2.04 THOUSANDS/ÂΜL (ref 0.6–4.47)
LYMPHOCYTES NFR BLD AUTO: 39 % (ref 14–44)
MCH RBC QN AUTO: 26.3 PG (ref 26.8–34.3)
MCHC RBC AUTO-ENTMCNC: 33.4 G/DL (ref 31.4–37.4)
MCV RBC AUTO: 79 FL (ref 82–98)
MICROALBUMIN UR-MCNC: 21.4 MG/L (ref 0–20)
MICROALBUMIN/CREAT 24H UR: 18 MG/G CREATININE (ref 0–30)
MONOCYTES # BLD AUTO: 0.55 THOUSAND/ÂΜL (ref 0.17–1.22)
MONOCYTES NFR BLD AUTO: 11 % (ref 4–12)
NEUTROPHILS # BLD AUTO: 2.27 THOUSANDS/ÂΜL (ref 1.85–7.62)
NEUTS SEG NFR BLD AUTO: 43 % (ref 43–75)
NONHDLC SERPL-MCNC: 109 MG/DL
NRBC BLD AUTO-RTO: 0 /100 WBCS
PLATELET # BLD AUTO: 238 THOUSANDS/UL (ref 149–390)
PMV BLD AUTO: 10.3 FL (ref 8.9–12.7)
POTASSIUM SERPL-SCNC: 3.6 MMOL/L (ref 3.5–5.3)
PROT SERPL-MCNC: 7.5 G/DL (ref 6.4–8.4)
RBC # BLD AUTO: 6.17 MILLION/UL (ref 3.88–5.62)
SODIUM SERPL-SCNC: 139 MMOL/L (ref 135–147)
TRIGL SERPL-MCNC: 46 MG/DL
TSH SERPL DL<=0.05 MIU/L-ACNC: 2.14 UIU/ML (ref 0.45–4.5)
WBC # BLD AUTO: 5.25 THOUSAND/UL (ref 4.31–10.16)

## 2023-06-24 PROCEDURE — 86803 HEPATITIS C AB TEST: CPT

## 2023-06-24 PROCEDURE — 80061 LIPID PANEL: CPT

## 2023-06-24 PROCEDURE — 84443 ASSAY THYROID STIM HORMONE: CPT

## 2023-06-24 PROCEDURE — 80053 COMPREHEN METABOLIC PANEL: CPT

## 2023-06-24 PROCEDURE — 36415 COLL VENOUS BLD VENIPUNCTURE: CPT

## 2023-06-24 PROCEDURE — 85025 COMPLETE CBC W/AUTO DIFF WBC: CPT

## 2023-06-24 PROCEDURE — 83036 HEMOGLOBIN GLYCOSYLATED A1C: CPT

## 2023-06-25 LAB — HCV AB SER QL: NORMAL

## 2023-06-30 ENCOUNTER — TELEPHONE (OUTPATIENT)
Dept: ENDOCRINOLOGY | Facility: CLINIC | Age: 53
End: 2023-06-30

## 2023-06-30 ENCOUNTER — OFFICE VISIT (OUTPATIENT)
Dept: FAMILY MEDICINE CLINIC | Facility: CLINIC | Age: 53
End: 2023-06-30
Payer: COMMERCIAL

## 2023-06-30 VITALS
OXYGEN SATURATION: 94 % | HEIGHT: 69 IN | SYSTOLIC BLOOD PRESSURE: 120 MMHG | TEMPERATURE: 99 F | DIASTOLIC BLOOD PRESSURE: 82 MMHG | HEART RATE: 94 BPM | RESPIRATION RATE: 16 BRPM | BODY MASS INDEX: 45.1 KG/M2 | WEIGHT: 304.5 LBS

## 2023-06-30 DIAGNOSIS — B35.1 NAIL FUNGUS: ICD-10-CM

## 2023-06-30 DIAGNOSIS — Z12.11 COLON CANCER SCREENING: ICD-10-CM

## 2023-06-30 DIAGNOSIS — Z00.00 ANNUAL PHYSICAL EXAM: Primary | ICD-10-CM

## 2023-06-30 DIAGNOSIS — I77.819 AORTIC ECTASIA (HCC): ICD-10-CM

## 2023-06-30 DIAGNOSIS — Z13.31 DEPRESSION SCREENING NEGATIVE: ICD-10-CM

## 2023-06-30 DIAGNOSIS — Z28.21 TETANUS, DIPHTHERIA, AND ACELLULAR PERTUSSIS (TDAP) VACCINATION DECLINED: ICD-10-CM

## 2023-06-30 DIAGNOSIS — Z28.21 PNEUMOCOCCAL VACCINATION DECLINED: ICD-10-CM

## 2023-06-30 DIAGNOSIS — I10 HYPERTENSION, UNSPECIFIED TYPE: ICD-10-CM

## 2023-06-30 DIAGNOSIS — E11.9 TYPE 2 DIABETES MELLITUS WITHOUT COMPLICATION, WITHOUT LONG-TERM CURRENT USE OF INSULIN (HCC): ICD-10-CM

## 2023-06-30 DIAGNOSIS — E78.5 HYPERLIPIDEMIA, UNSPECIFIED HYPERLIPIDEMIA TYPE: ICD-10-CM

## 2023-06-30 PROBLEM — N18.30 CKD (CHRONIC KIDNEY DISEASE) STAGE 3, GFR 30-59 ML/MIN (HCC): Status: RESOLVED | Noted: 2021-12-17 | Resolved: 2023-06-30

## 2023-06-30 PROCEDURE — 99396 PREV VISIT EST AGE 40-64: CPT | Performed by: INTERNAL MEDICINE

## 2023-06-30 RX ORDER — TERBINAFINE HYDROCHLORIDE 250 MG/1
250 TABLET ORAL DAILY
Qty: 30 TABLET | Refills: 3 | Status: SHIPPED | OUTPATIENT
Start: 2023-06-30 | End: 2023-07-30

## 2023-06-30 NOTE — ASSESSMENT & PLAN NOTE
Has aortic ectasia described as 4 1 cm will order CTA for follow up-also curious as to how his lungs look s/p bad case of covid

## 2023-06-30 NOTE — ASSESSMENT & PLAN NOTE
Lab Results   Component Value Date    HGBA1C 6 4 (H) 06/24/2023   Abraham's A1c is pretty good at 6 4% takes metformin intermittently

## 2023-06-30 NOTE — PROGRESS NOTES
[address] [facility]    NAME: Ronaldo Ruiz Sr   AGE: 48 y o  SEX: male  : 1970     DATE: 2023     Assessment and Plan:     Problem List Items Addressed This Visit        Endocrine    Type 2 diabetes mellitus ([facility] )       Lab Results   Component Value Date    HGBA1C 6 4 (H) 2023   Abraham's A1c is pretty good at 6 4% takes metformin intermittently            Cardiovascular and Mediastinum    Hypertension (Chronic)     Well controlled on intermittent amlodipine            Other    Hyperlipidemia   Other Visit Diagnoses     Annual physical exam    -  Primary    Tetanus, diphtheria, and acellular pertussis (Tdap) vaccination declined        Pneumococcal vaccination declined        Depression screening negative        BMI 40 0-44 9, adult ([facility] )        Colon cancer screening        Relevant Orders    Cologuard    A[facility] ectasia ([facility] )        Relevant Orders    CTA chest wo w contrast    Nail fungus        Relevant Medications    terbinafine (LamISIL) 250 mg tablet          Immunizations and preventive care screenings were discussed with patient today  Appropriate education was printed on patient's after visit summary  Discussed risks and benefits of prostate cancer screening  We discussed the controversial history of PSA screening for prostate cancer in the [place] as well as the risk of over detection and over treatment of prostate cancer by way of PSA screening  The patient understands that PSA blood testing is an imperfect way to screen for prostate cancer and that elevated PSA levels in the blood may also be caused by infection, inflammation, prostatic trauma or manipulation, urological procedures, or by benign prostatic enlargement      The role of the digital rectal examination in prostate cancer screening was also discussed and I discussed with him that there is large interobserver variability in the findings of digital rectal examination  Counseling:  Alcohol/drug use: discussed moderation in alcohol intake, the recommendations for healthy alcohol use, and avoidance of illicit drug use  Dental Health: discussed importance of regular tooth brushing, flossing, and dental visits  · Exercise: the importance of regular exercise/physical activity was discussed  Recommend exercise 3-5 times per week for at least 30 minutes  Depression Screening and Follow-up Plan: Patient was screened for depression during today's encounter  They screened negative with a PHQ-2 score of 0  Return in about 6 months (around 12/30/2023) for Next scheduled follow up  Chief Complaint:     Chief Complaint   Patient presents with   • Physical Exam      History of Present Illness:     Adult Annual Physical   Patient here for a comprehensive physical exam  The patient reports no problems  Diet and Physical Activity  · Diet/Nutrition: well balanced diet  · Exercise: no formal exercise  Depression Screening  PHQ-2/9 Depression Screening    Little interest or pleasure in doing things: 0 - not at all  Feeling down, depressed, or hopeless: 0 - not at all  PHQ-2 Score: 0  PHQ-2 Interpretation: Negative depression screen       General Health  · Sleep: sleeps well  · Hearing: normal - bilateral   · Vision: no vision problems  · Dental: regular dental visits   Health  · Symptoms include: none     Review of Systems:     Review of Systems   Constitutional: Negative  HENT: Negative  Respiratory: Negative  Cardiovascular: Negative  Gastrointestinal: Negative  Genitourinary: Negative  Musculoskeletal: Negative  Neurological: Negative  Hematological: Negative  Psychiatric/Behavioral: Negative         Past Medical History:     Past Medical History:   Diagnosis Date   • A[facility] ([facility] )    • Cardiomegaly    • Diabetes mellitus ([facility] ) March 2022   • HBP (high blood pressure) • Hyperglycemia    • Hypertension    • Lab test positive for detection of COVID-19 virus 2021   • Low back pain    • Meralgia paresthetica, right lower limb     meralgia paresthetica of right leg   • Obesity    • Pneumonia 23    COVID   • Pneumonia due to COVID-19 virus 2021    Bilateral COVID 19 pneumonia   • Seasonal allergies    • Visual impairment 2022   • Vitamin D deficiency    • Wears glasses       Past Surgical History:     Past Surgical History:   Procedure Laterality Date   • NO PAST SURGERIES        Family History:     Family History   Problem Relation Age of Onset   • Blindness Mother    • No Known Problems Father    • Diabetes Sister    • Breast cancer Sister    • Down syndrome Sister    • Diabetes Brother    • Glaucoma Brother    • Prostate cancer Brother         Stage 4 Metastatic Prostate Cancer   • Hypertension Brother    • Pulmonary embolism Brother    • Sickle cell trait Son    • Stroke Maternal Aunt       Social History:     Social History     Socioeconomic History   • Marital status: /Civil Union     Spouse name: None   • Number of children: None   • Years of education: None   • Highest education level: None   Occupational History   • None   Tobacco Use   • Smoking status: Never   • Smokeless tobacco: Never   Vaping Use   • Vaping Use: Never used   Substance and Sexual Activity   • Alcohol use: Not Currently     Comment: Nothing in over a month   • Drug use: Never   • Sexual activity: Yes     Partners: Female   Other Topics Concern   • None   Social History Narrative    Most recent tobacco use screenin2019    Do you currently or have you served in the [employer]: No    Were you activated, into active duty, as a member of the [employer] or as a Reservist: No    Marital status:     Sexual orientation: Heterosexual    Exercise level: None    Diet: Regular    Alcohol intake:  Moderate    Caffeine intake: None    Chewing tobacco: none Illicit drugs: none    Guns present in home: No    Seat belts used routinely: Yes    Sunscreen used routinely: No    Smoke alarm in home: Yes    Advance directive: No    Live alone or with others: with others    Are there stairs in your home: Yes    International travel: yes    Pets: No     Social Determinants of Health     Financial Resource Strain: Not on file   Food Insecurity: No Food Insecurity (3/11/2022)    Hunger Vital Sign    • Worried About Running Out of Food in the Last Year: Never true    • Ran Out of Food in the Last Year: Never true   Transportation Needs: No Transportation Needs (3/11/2022)    PRAPARE - Transportation    • Lack of Transportation (Medical): No    • Lack of Transportation (Non-Medical):  No   Physical Activity: Not on file   Stress: Not on file   Social Connections: Not on file   Intimate Partner Violence: Not on file   Housing Stability: Unknown (3/11/2022)    Housing Stability Vital Sign    • Unable to Pay for Housing in the Last Year: Not on file    • Number of Places Lived in the Last Year: 1    • Unstable Housing in the Last Year: No      Current Medications:     Current Outpatient Medications   Medication Sig Dispense Refill   • amLODIPine (NORVASC) 5 mg tablet Take 1 tablet by mouth once daily 90 tablet 0   • amLODIPine (NORVASC) 5 mg tablet Take 1 tablet (5 mg total) by mouth daily 90 tablet 0   • metFORMIN (GLUCOPHAGE) 500 mg tablet Take 1 tablet (500 mg total) by mouth 2 (two) times a day with meals 180 tablet 3   • terbinafine (LamISIL) 250 mg tablet Take 1 tablet (250 mg total) by mouth daily 30 tablet 3   • Blood Glucose Monitoring Suppl (OneTouch Verio) w/Device KIT Use 2 times a day to check blood sugar (Patient not taking: Reported on 6/30/2023) 1 kit 0   • glucose blood (OneTouch Verio) test strip Use 2 times a day to check blood sugar (Patient not taking: Reported on 6/30/2023) 200 strip 5   • Lancets (onetouch ultrasoft) lancets Use 2 times a day to check blood sugar "(Patient not taking: Reported on 6/30/2023) 100 each 5     No current facility-administered medications for this visit  Allergies:     No Known Allergies   Physical Exam:     /82 (BP Location: Left arm, Patient Position: Sitting, Cuff Size: Large)   Pulse 94   Temp 99 °F (37 2 °C) (Tympanic)   Resp 16   Ht 5' 8 75\" (1 746 m)   Wt (!) 138 kg (304 lb 8 oz)   SpO2 94%   BMI 45 29 kg/m²     Physical Exam  Constitutional:       Appearance: He is obese  HENT:      Head: Normocephalic and atraumatic  Right Ear: External ear normal       Left Ear: External ear normal       Nose: Nose normal       Mouth/Throat:      Mouth: Mucous membranes are moist    Eyes:      Extraocular Movements: Extraocular movements intact  Pupils: Pupils are equal, round, and reactive to light  Cardiovascular:      Rate and Rhythm: Normal rate and regular rhythm  Pulses: no weak pulses          Dorsalis pedis pulses are 2+ on the right side and 2+ on the left side  Posterior tibial pulses are 2+ on the right side and 2+ on the left side  Pulmonary:      Breath sounds: Rales present  Comments: B/L crackles at the lung bases  Abdominal:      Palpations: Abdomen is soft  Musculoskeletal:         General: Normal range of motion  Cervical back: Normal range of motion and neck supple  Feet:      Right foot:      Skin integrity: No ulcer, skin breakdown, erythema, warmth, callus or dry skin  Left foot:      Skin integrity: No ulcer, skin breakdown, erythema, warmth, callus or dry skin  Skin:     General: Skin is warm  Capillary Refill: Capillary refill takes less than 2 seconds  Neurological:      General: No focal deficit present  Mental Status: He is alert and oriented to person, place, and time  Psychiatric:         Mood and Affect: Mood normal          Behavior: Behavior normal          Thought Content:  Thought content normal          Judgment: Judgment normal       " Patient's shoes and socks removed  Right Foot/Ankle   Right Foot Inspection  Skin Exam: skin normal and skin intact  No dry skin, no warmth, no callus, no erythema, no maceration, no abnormal color, no pre-ulcer, no ulcer and no callus  Toe Exam: ROM and strength within normal limits  Sensory   Proprioception: intact  Monofilament testing: intact    Vascular  Capillary refills: < 3 seconds  The right DP pulse is 2+  The right PT pulse is 2+  Left Foot/Ankle  Left Foot Inspection  Skin Exam: skin normal and skin intact  No dry skin, no warmth, no erythema, no maceration, normal color, no pre-ulcer, no ulcer and no callus  Toe Exam: ROM and strength within normal limits  Sensory   Proprioception: intact  Monofilament testing: intact    Vascular  Capillary refills: < 3 seconds  The left DP pulse is 2+  The left PT pulse is 2+  Assign Risk Category  No deformity present  No loss of protective sensation  No weak pulses  Risk: 0      Vicky Morrissey MD  [facility]     BMI Counseling: Body mass index is 45 29 kg/m²  The BMI is above normal  Nutrition recommendations include reducing portion sizes, decreasing overall calorie intake, 3-5 servings of fruits/vegetables daily, reducing fast food intake, consuming healthier snacks, decreasing soda and/or juice intake, moderation in carbohydrate intake, increasing intake of lean protein, reducing intake of saturated fat and trans fat and reducing intake of cholesterol  Exercise recommendations include exercising 3-5 times per week, joining a gym and strength training exercises

## 2023-07-05 ENCOUNTER — TELEPHONE (OUTPATIENT)
Dept: ENDOCRINOLOGY | Facility: CLINIC | Age: 53
End: 2023-07-05

## 2023-07-06 ENCOUNTER — OFFICE VISIT (OUTPATIENT)
Dept: ENDOCRINOLOGY | Facility: CLINIC | Age: 53
End: 2023-07-06
Payer: COMMERCIAL

## 2023-07-06 VITALS
SYSTOLIC BLOOD PRESSURE: 138 MMHG | BODY MASS INDEX: 44.64 KG/M2 | HEIGHT: 69 IN | HEART RATE: 103 BPM | OXYGEN SATURATION: 95 % | WEIGHT: 301.4 LBS | DIASTOLIC BLOOD PRESSURE: 88 MMHG

## 2023-07-06 DIAGNOSIS — E09.9 STEROID-INDUCED DIABETES MELLITUS, INITIAL ENCOUNTER (HCC): ICD-10-CM

## 2023-07-06 DIAGNOSIS — E11.9 TYPE 2 DIABETES MELLITUS WITHOUT COMPLICATION, WITHOUT LONG-TERM CURRENT USE OF INSULIN (HCC): Primary | ICD-10-CM

## 2023-07-06 DIAGNOSIS — E78.2 MIXED HYPERLIPIDEMIA: ICD-10-CM

## 2023-07-06 DIAGNOSIS — I10 PRIMARY HYPERTENSION: ICD-10-CM

## 2023-07-06 DIAGNOSIS — E66.01 CLASS 3 SEVERE OBESITY WITH SERIOUS COMORBIDITY AND BODY MASS INDEX (BMI) OF 40.0 TO 44.9 IN ADULT, UNSPECIFIED OBESITY TYPE (HCC): ICD-10-CM

## 2023-07-06 DIAGNOSIS — I10 HYPERTENSION, UNSPECIFIED TYPE: ICD-10-CM

## 2023-07-06 DIAGNOSIS — T38.0X5A STEROID-INDUCED DIABETES MELLITUS, INITIAL ENCOUNTER (HCC): ICD-10-CM

## 2023-07-06 PROCEDURE — 99214 OFFICE O/P EST MOD 30 MIN: CPT | Performed by: INTERNAL MEDICINE

## 2023-07-06 RX ORDER — BLOOD SUGAR DIAGNOSTIC
STRIP MISCELLANEOUS
Qty: 200 STRIP | Refills: 5 | Status: SHIPPED | OUTPATIENT
Start: 2023-07-06

## 2023-07-06 RX ORDER — ATORVASTATIN CALCIUM 10 MG/1
20 TABLET, FILM COATED ORAL DAILY
Qty: 90 TABLET | Refills: 3 | Status: SHIPPED | OUTPATIENT
Start: 2023-07-06

## 2023-07-06 RX ORDER — LANCETS
EACH MISCELLANEOUS
Qty: 100 EACH | Refills: 5 | Status: SHIPPED | OUTPATIENT
Start: 2023-07-06

## 2023-07-06 RX ORDER — AMLODIPINE BESYLATE 5 MG/1
TABLET ORAL
Qty: 90 TABLET | Refills: 0 | Status: SHIPPED | OUTPATIENT
Start: 2023-07-06

## 2023-07-06 NOTE — PROGRESS NOTES
New Meng. 48 y.o. male MRN: [id number]    Encounter: [id number]      Assessment/Plan     1. Type 2 diabetes mellitus not on long-term insulin therapy  2. Obesity BMI 44  Well-controlled with last A1c 6.4%    Discussed different medications that can be used for glycemic management-oral hypoglycemic agents as well as injectable insulin and non-insulin medications (GLP 1 agonist), Tirzepatide (GLP1 Agonist/GIP) along with risks, benefits, side effect profile. No h/o pancreatitis/ MEN syndrome/ Medullary thyroid cancer    Would prefer oral medication and so will try rybelsus     Recommend the following at this time  Change Metformin to 1000 mg orally once a day  Start Rybelsus 3 mg orally daily  - stagger check blood sugars before meals and at bedtime  -Carb diet, regular exercise recommended  Follow-up in 3 months with repeat labs    3. Hyperlipidemia  Start Lipitor 10 mg orally daily    4. Hypertension  Blood pressure at goal   - continue current medications     CC: Diabetes    History of Present Illness     HPI:  New Meng. is a 48 y.o. male presents for a follow-up visit regarding diabetes management. Also has h/o hypertension , hyperlipidemia     Last seen in 4/2022  Last Eye exam: within the last 5 months - No DR     Current regimen:   metformin 500 mg once a day; forgets the second dose often     Feels well   Gained weight while he was home totaling 40 lbs, recently started a new job   Trying to eat better now since the last 3 weeks  Night shift 6 pm-6 am   Does a lot of walking at work   No vision changes   No polyuria/ polydipsia     Statin:  --   ACE-I/ARB: --  Takes amlodipine regularly     Home glucose monitoring: has not been checking BG     All other systems were reviewed and are negative.     Review of Systems      Historical Information   Past Medical History:   Diagnosis Date   • Aneurysm of thoracic aorta [facility])    • Cardiomegaly    • Diabetes mellitus [facility]) March 2022   • HBP (high blood pressure)    • Hyperglycemia    • Hypertension    • Lab test positive for detection of COVID-19 virus 12/12/2021   • Low back pain    • Meralgia paresthetica, right lower limb     meralgia paresthetica of right leg   • Obesity    • Pneumonia 12-16-21    COVID   • Pneumonia due to COVID-19 virus 12/16/2021    Bilateral COVID 19 pneumonia   • Seasonal allergies    • Visual impairment March 2022   • Vitamin D deficiency    • Wears glasses      Past Surgical History:   Procedure Laterality Date   • NO PAST SURGERIES       Social History   Social History     Substance and Sexual Activity   Alcohol Use Not Currently    Comment: Nothing in over a month     Social History     Substance and Sexual Activity   Drug Use Never     Social History     Tobacco Use   Smoking Status Never   Smokeless Tobacco Never     Family History:   Family History   Problem Relation Age of Onset   • Blindness Mother    • No Known Problems Father    • Diabetes Sister    • Breast cancer Sister    • Down syndrome Sister    • Diabetes Brother    • Glaucoma Brother    • Prostate cancer Brother         Stage 4 Metastatic Prostate Cancer   • Hypertension Brother    • Pulmonary embolism Brother    • Sickle cell trait Son    • Stroke Maternal Aunt        Meds/Allergies   Current Outpatient Medications   Medication Sig Dispense Refill   • amLODIPine (NORVASC) 5 mg tablet Take 1 tablet by mouth once daily 90 tablet 0   • metFORMIN (GLUCOPHAGE) 500 mg tablet Take 1 tablet (500 mg total) by mouth 2 (two) times a day with meals 180 tablet 3   • terbinafine (LamISIL) 250 mg tablet Take 1 tablet (250 mg total) by mouth daily 30 tablet 3   • amLODIPine (NORVASC) 5 mg tablet Take 1 tablet (5 mg total) by mouth daily (Patient not taking: Reported on 7/6/2023) 90 tablet 0   • Blood Glucose Monitoring Suppl (OneTouch Verio) w/Device KIT Use 2 times a day to check blood sugar (Patient not taking: Reported on 6/30/2023) 1 kit 0   • glucose blood (OneTouch Verio) test strip Use 2 times a day to check blood sugar (Patient not taking: Reported on 6/30/2023) 200 strip 5   • Lancets (onetouch ultrasoft) lancets Use 2 times a day to check blood sugar (Patient not taking: Reported on 6/30/2023) 100 each 5     No current facility-administered medications for this visit. No Known Allergies    Objective   Vitals: Blood pressure 138/88, pulse 103, height 5' 9" (1.753 m), weight (!) 137 kg (301 lb 6.4 oz), SpO2 95 %. Physical Exam  Constitutional:       General: He is not in acute distress. Appearance: He is well-developed. He is not diaphoretic. HENT:      Head: Normocephalic and atraumatic. Eyes:      Conjunctiva/sclera: Conjunctivae normal.      Pupils: Pupils are equal, round, and reactive to light. Cardiovascular:      Rate and Rhythm: Normal rate and regular rhythm. Heart sounds: Normal heart sounds. No murmur heard. Pulmonary:      Effort: Pulmonary effort is normal. No respiratory distress. Breath sounds: Normal breath sounds. No wheezing. Abdominal:      General: There is no distension. Palpations: Abdomen is soft. Tenderness: There is no abdominal tenderness. There is no guarding. Musculoskeletal:      Cervical back: Normal range of motion and neck supple. Skin:     General: Skin is warm and dry. Findings: No erythema or rash. Neurological:      Mental Status: He is alert and oriented to person, place, and time. Psychiatric:         Behavior: Behavior normal.         Thought Content: Thought content normal.         The history was obtained from the review of the chart, patient.     Lab Results:   Lab Results   Component Value Date/Time    Hemoglobin A1C 6.4 (H) 06/24/2023 09:36 AM    Hemoglobin A1C 7.0 (A) 07/11/2022 11:08 AM    WBC 5.25 06/24/2023 09:36 AM    Hemoglobin 16.2 06/24/2023 09:36 AM    Hematocrit 48.5 06/24/2023 09:36 AM    MCV 79 (L) 06/24/2023 09:36 AM    Platelets 816 82/30/8948 09:36 AM BUN 13 06/24/2023 09:36 AM    BUN 15 09/12/2022 08:43 AM    Potassium 3.6 06/24/2023 09:36 AM    Potassium 4.0 09/12/2022 08:43 AM    Chloride 103 06/24/2023 09:36 AM    Chloride 110 (H) 09/12/2022 08:43 AM    CO2 26 06/24/2023 09:36 AM    CO2 25 09/12/2022 08:43 AM    Creatinine 1.16 06/24/2023 09:36 AM    Creatinine 1.10 09/12/2022 08:43 AM    AST 17 06/24/2023 09:36 AM    AST 14 09/12/2022 08:43 AM    ALT 37 06/24/2023 09:36 AM    ALT 33 09/12/2022 08:43 AM    Total Protein 7.5 06/24/2023 09:36 AM    Total Protein 7.4 09/12/2022 08:43 AM    Albumin 3.9 06/24/2023 09:36 AM    Albumin 3.3 (L) 09/12/2022 08:43 AM    HDL, Direct 37 (L) 06/24/2023 09:36 AM    HDL, Direct 45 09/12/2022 08:43 AM    Triglycerides 46 06/24/2023 09:36 AM    Triglycerides 54 09/12/2022 08:43 AM         Imaging Studies: I have personally reviewed pertinent reports. Portions of the record may have been created with voice recognition software. Occasional wrong word or "sound a like" substitutions may have occurred due to the inherent limitations of voice recognition software. Read the chart carefully and recognize, using context, where substitutions have occurred.

## 2023-07-06 NOTE — PATIENT INSTRUCTIONS
Change Metformin to 1000 mg orally once a day  Start Rybelsus 3 mg orally daily  Please stagger check blood sugars before meals and at bedtime  Start Lipitor 10 mg orally daily  Follow-up in 3 months with repeat labs

## 2023-07-10 ENCOUNTER — TELEPHONE (OUTPATIENT)
Dept: ENDOCRINOLOGY | Facility: CLINIC | Age: 53
End: 2023-07-10

## 2023-07-10 NOTE — TELEPHONE ENCOUNTER
Approved, PA # [id number], Eff 7/10/2023 - 7/10/2026, Quantity # 30 per 30 days    Called and notified [facility] of approval.

## 2023-08-16 LAB — COLOGUARD RESULT REPORTABLE: NEGATIVE

## 2023-09-27 DIAGNOSIS — I10 HYPERTENSION, UNSPECIFIED TYPE: ICD-10-CM

## 2023-09-28 DIAGNOSIS — I10 PRIMARY HYPERTENSION: ICD-10-CM

## 2023-09-28 DIAGNOSIS — E11.9 TYPE 2 DIABETES MELLITUS WITHOUT COMPLICATION, WITHOUT LONG-TERM CURRENT USE OF INSULIN (HCC): ICD-10-CM

## 2023-09-28 DIAGNOSIS — E78.2 MIXED HYPERLIPIDEMIA: ICD-10-CM

## 2023-09-28 RX ORDER — AMLODIPINE BESYLATE 5 MG/1
TABLET ORAL
Qty: 90 TABLET | Refills: 0 | Status: SHIPPED | OUTPATIENT
Start: 2023-09-28

## 2023-09-28 RX ORDER — ATORVASTATIN CALCIUM 10 MG/1
20 TABLET, FILM COATED ORAL DAILY
Qty: 90 TABLET | Refills: 3 | Status: SHIPPED | OUTPATIENT
Start: 2023-09-28

## 2023-12-22 DIAGNOSIS — B35.1 NAIL FUNGUS: ICD-10-CM

## 2023-12-23 DIAGNOSIS — I10 HYPERTENSION, UNSPECIFIED TYPE: ICD-10-CM

## 2023-12-26 RX ORDER — AMLODIPINE BESYLATE 5 MG/1
TABLET ORAL
Qty: 90 TABLET | Refills: 1 | Status: SHIPPED | OUTPATIENT
Start: 2023-12-26

## 2023-12-27 RX ORDER — TERBINAFINE HYDROCHLORIDE 250 MG/1
250 TABLET ORAL DAILY
Qty: 30 TABLET | Refills: 5 | Status: SHIPPED | OUTPATIENT
Start: 2023-12-27 | End: 2024-01-26

## 2024-03-27 ENCOUNTER — TELEPHONE (OUTPATIENT)
Dept: FAMILY MEDICINE CLINIC | Facility: CLINIC | Age: 54
End: 2024-03-27

## 2024-04-29 ENCOUNTER — TELEPHONE (OUTPATIENT)
Age: 54
End: 2024-04-29

## 2024-04-29 LAB
LEFT EYE DIABETIC RETINOPATHY: NORMAL
RIGHT EYE DIABETIC RETINOPATHY: NORMAL
SEVERITY (EYE EXAM): NORMAL

## 2024-04-29 NOTE — TELEPHONE ENCOUNTER
Pts wife called back requesting the diabetic eye form to please be faxed to [phone]. Spoke to Amy in the office, and she is going to fax over.

## 2024-04-30 DIAGNOSIS — E11.9 TYPE 2 DIABETES MELLITUS WITHOUT COMPLICATION, WITHOUT LONG-TERM CURRENT USE OF INSULIN (HCC): ICD-10-CM

## 2024-04-30 DIAGNOSIS — Z12.5 PROSTATE CANCER SCREENING: ICD-10-CM

## 2024-04-30 DIAGNOSIS — E78.5 HYPERLIPIDEMIA, UNSPECIFIED HYPERLIPIDEMIA TYPE: ICD-10-CM

## 2024-04-30 DIAGNOSIS — Z13.29 THYROID DISORDER SCREENING: ICD-10-CM

## 2024-04-30 DIAGNOSIS — I10 HYPERTENSION, UNSPECIFIED TYPE: Primary | ICD-10-CM

## 2024-05-11 ENCOUNTER — APPOINTMENT (OUTPATIENT)
Dept: LAB | Facility: HOSPITAL | Age: 54
End: 2024-05-11
Payer: COMMERCIAL

## 2024-05-11 DIAGNOSIS — Z12.5 PROSTATE CANCER SCREENING: ICD-10-CM

## 2024-05-11 DIAGNOSIS — I10 HYPERTENSION, UNSPECIFIED TYPE: ICD-10-CM

## 2024-05-11 DIAGNOSIS — Z13.29 THYROID DISORDER SCREENING: ICD-10-CM

## 2024-05-11 DIAGNOSIS — E11.9 TYPE 2 DIABETES MELLITUS WITHOUT COMPLICATION, WITHOUT LONG-TERM CURRENT USE OF INSULIN (HCC): ICD-10-CM

## 2024-05-11 DIAGNOSIS — E78.5 HYPERLIPIDEMIA, UNSPECIFIED HYPERLIPIDEMIA TYPE: ICD-10-CM

## 2024-05-11 DIAGNOSIS — E78.2 MIXED HYPERLIPIDEMIA: ICD-10-CM

## 2024-05-11 DIAGNOSIS — I10 PRIMARY HYPERTENSION: ICD-10-CM

## 2024-05-11 LAB
ALBUMIN SERPL BCP-MCNC: 4.2 G/DL (ref 3.5–5)
ALP SERPL-CCNC: 63 U/L (ref 34–104)
ALT SERPL W P-5'-P-CCNC: 24 U/L (ref 7–52)
ANION GAP SERPL CALCULATED.3IONS-SCNC: 9 MMOL/L (ref 4–13)
AST SERPL W P-5'-P-CCNC: 20 U/L (ref 13–39)
BASOPHILS # BLD AUTO: 0.02 THOUSANDS/ÂΜL (ref 0–0.1)
BASOPHILS NFR BLD AUTO: 0 % (ref 0–1)
BILIRUB SERPL-MCNC: 0.46 MG/DL (ref 0.2–1)
BUN SERPL-MCNC: 16 MG/DL (ref 5–25)
CALCIUM SERPL-MCNC: 9.4 MG/DL (ref 8.4–10.2)
CHLORIDE SERPL-SCNC: 103 MMOL/L (ref 96–108)
CHOLEST SERPL-MCNC: 123 MG/DL
CO2 SERPL-SCNC: 28 MMOL/L (ref 21–32)
CREAT SERPL-MCNC: 1.27 MG/DL (ref 0.6–1.3)
CREAT UR-MCNC: 179.2 MG/DL
EOSINOPHIL # BLD AUTO: 0.35 THOUSAND/ÂΜL (ref 0–0.61)
EOSINOPHIL NFR BLD AUTO: 6 % (ref 0–6)
ERYTHROCYTE [DISTWIDTH] IN BLOOD BY AUTOMATED COUNT: 15.1 % (ref 11.6–15.1)
GFR SERPL CREATININE-BSD FRML MDRD: 64 ML/MIN/1.73SQ M
GLUCOSE P FAST SERPL-MCNC: 114 MG/DL (ref 65–99)
HCT VFR BLD AUTO: 48.1 % (ref 36.5–49.3)
HDLC SERPL-MCNC: 36 MG/DL
HGB BLD-MCNC: 16 G/DL (ref 12–17)
IMM GRANULOCYTES # BLD AUTO: 0.01 THOUSAND/UL (ref 0–0.2)
IMM GRANULOCYTES NFR BLD AUTO: 0 % (ref 0–2)
LDLC SERPL CALC-MCNC: 77 MG/DL (ref 0–100)
LYMPHOCYTES # BLD AUTO: 2.11 THOUSANDS/ÂΜL (ref 0.6–4.47)
LYMPHOCYTES NFR BLD AUTO: 38 % (ref 14–44)
MCH RBC QN AUTO: 26.4 PG (ref 26.8–34.3)
MCHC RBC AUTO-ENTMCNC: 33.3 G/DL (ref 31.4–37.4)
MCV RBC AUTO: 79 FL (ref 82–98)
MICROALBUMIN UR-MCNC: 17.8 MG/L
MICROALBUMIN/CREAT 24H UR: 10 MG/G CREATININE (ref 0–30)
MONOCYTES # BLD AUTO: 0.58 THOUSAND/ÂΜL (ref 0.17–1.22)
MONOCYTES NFR BLD AUTO: 10 % (ref 4–12)
NEUTROPHILS # BLD AUTO: 2.49 THOUSANDS/ÂΜL (ref 1.85–7.62)
NEUTS SEG NFR BLD AUTO: 46 % (ref 43–75)
NONHDLC SERPL-MCNC: 87 MG/DL
NRBC BLD AUTO-RTO: 0 /100 WBCS
PLATELET # BLD AUTO: 223 THOUSANDS/UL (ref 149–390)
PMV BLD AUTO: 10.6 FL (ref 8.9–12.7)
POTASSIUM SERPL-SCNC: 4 MMOL/L (ref 3.5–5.3)
PROT SERPL-MCNC: 7.5 G/DL (ref 6.4–8.4)
PSA SERPL-MCNC: 0.71 NG/ML (ref 0–4)
RBC # BLD AUTO: 6.07 MILLION/UL (ref 3.88–5.62)
SODIUM SERPL-SCNC: 140 MMOL/L (ref 135–147)
TRIGL SERPL-MCNC: 51 MG/DL
TSH SERPL DL<=0.05 MIU/L-ACNC: 2.37 UIU/ML (ref 0.45–4.5)
WBC # BLD AUTO: 5.56 THOUSAND/UL (ref 4.31–10.16)

## 2024-05-11 PROCEDURE — 80053 COMPREHEN METABOLIC PANEL: CPT

## 2024-05-11 PROCEDURE — 80061 LIPID PANEL: CPT

## 2024-05-11 PROCEDURE — G0103 PSA SCREENING: HCPCS

## 2024-05-11 PROCEDURE — 82570 ASSAY OF URINE CREATININE: CPT

## 2024-05-11 PROCEDURE — 85025 COMPLETE CBC W/AUTO DIFF WBC: CPT

## 2024-05-11 PROCEDURE — 84443 ASSAY THYROID STIM HORMONE: CPT

## 2024-05-11 PROCEDURE — 36415 COLL VENOUS BLD VENIPUNCTURE: CPT

## 2024-05-11 PROCEDURE — 83036 HEMOGLOBIN GLYCOSYLATED A1C: CPT

## 2024-05-11 PROCEDURE — 82043 UR ALBUMIN QUANTITATIVE: CPT

## 2024-05-12 LAB
EST. AVERAGE GLUCOSE BLD GHB EST-MCNC: 146 MG/DL
HBA1C MFR BLD: 6.7 %

## 2024-05-13 ENCOUNTER — TELEPHONE (OUTPATIENT)
Dept: ENDOCRINOLOGY | Facility: CLINIC | Age: 54
End: 2024-05-13

## 2024-05-13 NOTE — TELEPHONE ENCOUNTER
Called patient to schedule follow  up. Going tomorrow to see PCP said will call us if needs appointment after his visit.

## 2024-05-14 ENCOUNTER — OFFICE VISIT (OUTPATIENT)
Dept: FAMILY MEDICINE CLINIC | Facility: CLINIC | Age: 54
End: 2024-05-14
Payer: COMMERCIAL

## 2024-05-14 ENCOUNTER — TELEPHONE (OUTPATIENT)
Dept: ENDOCRINOLOGY | Facility: CLINIC | Age: 54
End: 2024-05-14

## 2024-05-14 VITALS
DIASTOLIC BLOOD PRESSURE: 90 MMHG | SYSTOLIC BLOOD PRESSURE: 120 MMHG | TEMPERATURE: 97.4 F | BODY MASS INDEX: 43.69 KG/M2 | OXYGEN SATURATION: 95 % | HEIGHT: 69 IN | RESPIRATION RATE: 16 BRPM | WEIGHT: 295 LBS | HEART RATE: 90 BPM

## 2024-05-14 DIAGNOSIS — E11.9 TYPE 2 DIABETES MELLITUS WITHOUT COMPLICATION, UNSPECIFIED WHETHER LONG TERM INSULIN USE (HCC): Primary | ICD-10-CM

## 2024-05-14 DIAGNOSIS — I71.20 THORACIC AORTIC ANEURYSM WITHOUT RUPTURE, UNSPECIFIED PART (HCC): Chronic | ICD-10-CM

## 2024-05-14 DIAGNOSIS — E78.5 HYPERLIPIDEMIA, UNSPECIFIED HYPERLIPIDEMIA TYPE: ICD-10-CM

## 2024-05-14 DIAGNOSIS — I10 PRIMARY HYPERTENSION: Chronic | ICD-10-CM

## 2024-05-14 PROCEDURE — 99214 OFFICE O/P EST MOD 30 MIN: CPT | Performed by: INTERNAL MEDICINE

## 2024-05-14 RX ORDER — TERBINAFINE HYDROCHLORIDE 250 MG/1
TABLET ORAL
COMMUNITY
Start: 2024-05-08

## 2024-05-14 NOTE — TELEPHONE ENCOUNTER
Called patient to schedule follow up. Going to PCP and they will call us if they need to follow up after PCP visit.

## 2024-05-14 NOTE — PROGRESS NOTES
Assessment/Plan:Abraham here to go over his labs and for diabetic foot exam-doing great-working for [employer] now-bp pretty good-had cologuard for colon cancer screening         Problem List Items Addressed This Visit       Hypertension (Chronic)    Thoracic aortic aneurysm without rupture (HCC) (Chronic)    Type 2 diabetes mellitus (HCC) - Primary    Hyperlipidemia         Subjective:      Patient ID: Pa Rosario Sr. is a 53 y.o. male.    Abraham here for a checkup on his hx of HTN, obesity, DM II, and HL-doing well, no complaints-discussed all of his recently done labwork with him-A1c 6.7% and he takes metformin and rybelsus, albeit inconsistently at times    Diabetes    The following portions of the patient's history were reviewed and updated as appropriate:   Past Medical History:  He has a past medical history of Aneurysm of thoracic aorta (HCC), Cardiomegaly, Diabetes mellitus (HCC) (March 2022), HBP (high blood pressure), Hyperglycemia, Hypertension, Lab test positive for detection of COVID-19 virus (12/12/2021), Low back pain, Meralgia paresthetica, right lower limb, Obesity, Pneumonia (12-16-21), Pneumonia due to COVID-19 virus (12/16/2021), Seasonal allergies, Visual impairment (March 2022), Vitamin D deficiency, and Wears glasses.,  _______________________________________________________________________  Medical Problems:  does not have any pertinent problems on file.,  _______________________________________________________________________  Past Surgical History:   has a past surgical history that includes Cologuard (Historical) (08/08/2023).,  _______________________________________________________________________  Family History:  family history includes Blindness in his mother; Breast cancer in his sister; Diabetes in his brother and sister; Down syndrome in his sister; Glaucoma in his brother; Hypertension in his brother; No Known Problems in his father; Prostate cancer in his brother; Pulmonary  "embolism in his brother; Sickle cell trait in his son; Stroke in his maternal aunt.,  _______________________________________________________________________  Social History:   reports that he has never smoked. He has never used smokeless tobacco. He reports that he does not currently use alcohol. He reports that he does not use drugs.,  _______________________________________________________________________  Allergies:  has No Known Allergies..  _______________________________________________________________________  Current Outpatient Medications   Medication Sig Dispense Refill   • amLODIPine (NORVASC) 5 mg tablet Take 1 tablet by mouth once daily 90 tablet 1   • atorvastatin (LIPITOR) 10 mg tablet Take 2 tablets (20 mg total) by mouth daily 90 tablet 3   • metFORMIN (GLUCOPHAGE) 500 mg tablet Take 2 tablets (1,000 mg total) by mouth daily with breakfast 180 tablet 3   • semaglutide (Rybelsus) 3 MG tablet Take 1 tablet (3 mg total) by mouth daily before breakfast 90 tablet 3   • terbinafine (LamISIL) 250 mg tablet        No current facility-administered medications for this visit.     _______________________________________________________________________  Review of Systems   Constitutional: Negative.    HENT: Negative.     Respiratory: Negative.     Cardiovascular: Negative.    Gastrointestinal: Negative.    Musculoskeletal: Negative.    Neurological: Negative.    Hematological: Negative.    Psychiatric/Behavioral: Negative.           Objective:  Vitals:    05/14/24 1332   BP: 120/90   BP Location: Left arm   Patient Position: Sitting   Cuff Size: Large   Pulse: 90   Resp: 16   Temp: (!) 97.4 °F (36.3 °C)   TempSrc: Tympanic   SpO2: 95%   Weight: 134 kg (295 lb)   Height: 5' 9\" (1.753 m)     Body mass index is 43.56 kg/m².     Physical Exam  Constitutional:       Appearance: He is obese.   HENT:      Head: Normocephalic and atraumatic.      Right Ear: External ear normal.      Left Ear: External ear normal.      " Nose: Nose normal.      Mouth/Throat:      Mouth: Mucous membranes are moist.   Eyes:      Extraocular Movements: Extraocular movements intact.   Cardiovascular:      Rate and Rhythm: Normal rate and regular rhythm.      Pulses: no weak pulses.           Dorsalis pedis pulses are 2+ on the right side and 2+ on the left side.      Heart sounds: Normal heart sounds.   Pulmonary:      Effort: Pulmonary effort is normal.      Breath sounds: Normal breath sounds.   Abdominal:      Palpations: Abdomen is soft.   Musculoskeletal:         General: Normal range of motion.      Cervical back: Normal range of motion and neck supple.   Feet:      Right foot:      Skin integrity: No ulcer, skin breakdown, erythema, warmth, callus or dry skin.      Left foot:      Skin integrity: No ulcer, skin breakdown, erythema, warmth, callus or dry skin.   Skin:     General: Skin is warm.   Neurological:      General: No focal deficit present.      Mental Status: He is alert and oriented to person, place, and time.   Psychiatric:         Mood and Affect: Mood normal.         Behavior: Behavior normal.     Patient's shoes and socks removed.    Right Foot/Ankle   Right Foot Inspection  Skin Exam: skin normal and skin intact. No dry skin, no warmth, no callus, no erythema, no maceration, no abnormal color, no pre-ulcer, no ulcer and no callus.     Toe Exam: ROM and strength within normal limits.     Sensory   Proprioception: intact  Monofilament testing: intact    Vascular  The right DP pulse is 2+.     Left Foot/Ankle  Left Foot Inspection  Skin Exam: skin normal and skin intact. No dry skin, no warmth, no erythema, no maceration, normal color, no pre-ulcer, no ulcer and no callus.     Toe Exam: ROM and strength within normal limits.     Sensory   Proprioception: intact  Monofilament testing: intact    Vascular  The left DP pulse is 2+.     Assign Risk Category  No deformity present  No loss of protective sensation  No weak pulses  Risk: 0

## 2024-07-06 DIAGNOSIS — I10 HYPERTENSION, UNSPECIFIED TYPE: ICD-10-CM

## 2024-07-06 RX ORDER — AMLODIPINE BESYLATE 5 MG/1
TABLET ORAL
Qty: 90 TABLET | Refills: 0 | Status: SHIPPED | OUTPATIENT
Start: 2024-07-06

## 2024-07-20 DIAGNOSIS — I10 PRIMARY HYPERTENSION: ICD-10-CM

## 2024-07-20 DIAGNOSIS — E11.9 TYPE 2 DIABETES MELLITUS WITHOUT COMPLICATION, WITHOUT LONG-TERM CURRENT USE OF INSULIN (HCC): ICD-10-CM

## 2024-07-20 DIAGNOSIS — E78.2 MIXED HYPERLIPIDEMIA: ICD-10-CM

## 2024-07-20 RX ORDER — ATORVASTATIN CALCIUM 10 MG/1
20 TABLET, FILM COATED ORAL DAILY
Qty: 60 TABLET | Refills: 5 | Status: SHIPPED | OUTPATIENT
Start: 2024-07-20 | End: 2024-07-21 | Stop reason: SDUPTHER

## 2024-07-21 DIAGNOSIS — I10 PRIMARY HYPERTENSION: ICD-10-CM

## 2024-07-21 DIAGNOSIS — E78.2 MIXED HYPERLIPIDEMIA: ICD-10-CM

## 2024-07-21 DIAGNOSIS — E11.9 TYPE 2 DIABETES MELLITUS WITHOUT COMPLICATION, WITHOUT LONG-TERM CURRENT USE OF INSULIN (HCC): ICD-10-CM

## 2024-07-21 RX ORDER — ATORVASTATIN CALCIUM 10 MG/1
20 TABLET, FILM COATED ORAL DAILY
Qty: 180 TABLET | Refills: 1 | Status: SHIPPED | OUTPATIENT
Start: 2024-07-21

## 2024-08-06 ENCOUNTER — OFFICE VISIT (OUTPATIENT)
Dept: ENDOCRINOLOGY | Facility: CLINIC | Age: 54
End: 2024-08-06
Payer: COMMERCIAL

## 2024-08-06 VITALS
BODY MASS INDEX: 43.4 KG/M2 | HEIGHT: 69 IN | HEART RATE: 92 BPM | WEIGHT: 293 LBS | SYSTOLIC BLOOD PRESSURE: 126 MMHG | DIASTOLIC BLOOD PRESSURE: 80 MMHG | OXYGEN SATURATION: 97 %

## 2024-08-06 DIAGNOSIS — I10 PRIMARY HYPERTENSION: ICD-10-CM

## 2024-08-06 DIAGNOSIS — E11.9 TYPE 2 DIABETES MELLITUS WITHOUT COMPLICATION, WITHOUT LONG-TERM CURRENT USE OF INSULIN (HCC): Primary | ICD-10-CM

## 2024-08-06 DIAGNOSIS — E78.2 MIXED HYPERLIPIDEMIA: ICD-10-CM

## 2024-08-06 DIAGNOSIS — E66.01 CLASS 3 SEVERE OBESITY WITH SERIOUS COMORBIDITY AND BODY MASS INDEX (BMI) OF 40.0 TO 44.9 IN ADULT, UNSPECIFIED OBESITY TYPE (HCC): ICD-10-CM

## 2024-08-06 PROCEDURE — 99214 OFFICE O/P EST MOD 30 MIN: CPT | Performed by: INTERNAL MEDICINE

## 2024-08-06 RX ORDER — METFORMIN HYDROCHLORIDE 750 MG/1
1500 TABLET, EXTENDED RELEASE ORAL
Qty: 180 TABLET | Refills: 3 | Status: SHIPPED | OUTPATIENT
Start: 2024-08-06

## 2024-08-06 RX ORDER — ATORVASTATIN CALCIUM 20 MG/1
20 TABLET, FILM COATED ORAL DAILY
Qty: 90 TABLET | Refills: 3 | Status: SHIPPED | OUTPATIENT
Start: 2024-08-06

## 2024-08-06 NOTE — PATIENT INSTRUCTIONS
Change to metformin  mg 2 tablets orally daily  Consistent carb diet, exercise as discussed  You are being referred for diabetes education/nutrition therapy  Please have labs done as ordered  Follow-up in 3 months

## 2024-08-06 NOTE — PROGRESS NOTES
Pa Rosario Sr. 54 y.o. male MRN: [id number]    Encounter: [id number]      Assessment & Plan     Type 2 diabetes mellitus not on long term insulin therapy   Obesity BMI 43  Last A1c 6.7% well controlled however has trended up as compared to before     Recommend the following at this time  Change metformin to  mg 2 tablets orally daily  Recommend following a consistent carb diet, exercise   - referred for diabetes education/medical nutrition therapy   - labs to be done as ordered earliest next week   - stagger check BG   Follow-up in 3 months    3. Hyperlipidemia  - continue statin therapy    4. Hypertension  Blood pressure at goal   - continue medications at current dose      CC: Diabetes    History of Present Illness     HPI:  Pa Rosario Sr. is a 54 y.o. male presents for a follow-up visit regarding diabetes management.   Also has hypertension, hyperlipidemia, obesity     Last seen in 7/2023  Last Eye exam: Approx 4 months ago per patient no DR     Current regimen:   Metformin 1 gm orally daily   Did not start Rybelsus as it was expensive    Overall feels well   No change in appetite, has been avoiding sodas  Lost 8 lbs over the last 1 year   No exercise however is active at work  - bending and lifting   No pother symptoms     Statin:  lipitor   ACE-I/ARB:  --     Home glucose monitoring:  has not been checking   Symptoms of hypoglycemia :  no lows     All other systems were reviewed and are negative.    Review of Systems      Historical Information   Past Medical History:   Diagnosis Date    Aneurysm of thoracic aorta (HCC)     Cardiomegaly     Diabetes mellitus (HCC) March 2022    HBP (high blood pressure)     Hyperglycemia     Hypertension     Lab test positive for detection of COVID-19 virus 12/12/2021    Low back pain     Meralgia paresthetica, right lower limb     meralgia paresthetica of right leg    Obesity     Pneumonia 12-16-21    COVID    Pneumonia due to COVID-19 virus 12/16/2021  "   Bilateral COVID 19 pneumonia    Seasonal allergies     Visual impairment March 2022    Vitamin D deficiency     Wears glasses      Past Surgical History:   Procedure Laterality Date    COLOGUARD (HISTORICAL)  08/08/2023    Negative     Social History   Social History     Substance and Sexual Activity   Alcohol Use Not Currently    Comment: Nothing in over a month     Social History     Substance and Sexual Activity   Drug Use Never     Social History     Tobacco Use   Smoking Status Never   Smokeless Tobacco Never     Family History:   Family History   Problem Relation Age of Onset    Blindness Mother     No Known Problems Father     Diabetes Sister     Breast cancer Sister     Down syndrome Sister     Diabetes Brother     Glaucoma Brother     Prostate cancer Brother         Stage 4 Metastatic Prostate Cancer    Hypertension Brother     Pulmonary embolism Brother     Sickle cell trait Son     Stroke Maternal Aunt        Meds/Allergies   Current Outpatient Medications   Medication Sig Dispense Refill    amLODIPine (NORVASC) 5 mg tablet Take 1 tablet by mouth once daily 90 tablet 0    atorvastatin (LIPITOR) 10 mg tablet Take 2 tablets (20 mg total) by mouth daily 180 tablet 1    metFORMIN (GLUCOPHAGE) 500 mg tablet TAKE 2 TABLETS BY MOUTH ONCE DAILY WITH BREAKFAST 270 tablet 1    semaglutide (Rybelsus) 3 MG tablet Take 1 tablet (3 mg total) by mouth daily before breakfast 90 tablet 3    terbinafine (LamISIL) 250 mg tablet        No current facility-administered medications for this visit.     No Known Allergies    Objective   Vitals: Blood pressure 126/80, pulse 92, height 5' 9\" (1.753 m), weight 133 kg (293 lb), SpO2 97%.    Physical Exam  Constitutional:       General: He is not in acute distress.     Appearance: He is well-developed. He is not diaphoretic.   HENT:      Head: Normocephalic and atraumatic.   Eyes:      Conjunctiva/sclera: Conjunctivae normal.   Cardiovascular:      Rate and Rhythm: Normal rate and " "regular rhythm.      Heart sounds: Normal heart sounds. No murmur heard.  Pulmonary:      Effort: Pulmonary effort is normal. No respiratory distress.      Breath sounds: Normal breath sounds. No wheezing.   Abdominal:      General: There is no distension.      Palpations: Abdomen is soft.      Tenderness: There is no abdominal tenderness. There is no guarding.   Musculoskeletal:      Cervical back: Normal range of motion and neck supple.      Right lower leg: No edema.      Left lower leg: No edema.   Skin:     General: Skin is warm and dry.      Findings: No erythema or rash.   Neurological:      Mental Status: He is alert and oriented to person, place, and time.   Psychiatric:         Behavior: Behavior normal.         Thought Content: Thought content normal.       The history was obtained from the review of the chart, patient.    Lab Results:   Lab Results   Component Value Date/Time    Hemoglobin A1C 6.7 (H) 05/11/2024 07:17 AM    WBC 5.56 05/11/2024 07:17 AM    Hemoglobin 16.0 05/11/2024 07:17 AM    Hematocrit 48.1 05/11/2024 07:17 AM    MCV 79 (L) 05/11/2024 07:17 AM    Platelets 223 05/11/2024 07:17 AM    BUN 16 05/11/2024 07:17 AM    Potassium 4.0 05/11/2024 07:17 AM    Chloride 103 05/11/2024 07:17 AM    CO2 28 05/11/2024 07:17 AM    Creatinine 1.27 05/11/2024 07:17 AM    AST 20 05/11/2024 07:17 AM    ALT 24 05/11/2024 07:17 AM    Total Protein 7.5 05/11/2024 07:17 AM    Albumin 4.2 05/11/2024 07:17 AM    HDL, Direct 36 (L) 05/11/2024 07:17 AM    Triglycerides 51 05/11/2024 07:17 AM         Imaging Studies: I have personally reviewed pertinent reports.      Portions of the record may have been created with voice recognition software. Occasional wrong word or \"sound a like\" substitutions may have occurred due to the inherent limitations of voice recognition software. Read the chart carefully and recognize, using context, where substitutions have occurred.     "

## 2024-09-16 ENCOUNTER — OFFICE VISIT (OUTPATIENT)
Dept: FAMILY MEDICINE CLINIC | Facility: CLINIC | Age: 54
End: 2024-09-16
Payer: COMMERCIAL

## 2024-09-16 VITALS
SYSTOLIC BLOOD PRESSURE: 124 MMHG | HEART RATE: 101 BPM | WEIGHT: 292 LBS | HEIGHT: 69 IN | BODY MASS INDEX: 43.25 KG/M2 | OXYGEN SATURATION: 95 % | DIASTOLIC BLOOD PRESSURE: 78 MMHG

## 2024-09-16 DIAGNOSIS — Z00.00 ANNUAL PHYSICAL EXAM: Primary | ICD-10-CM

## 2024-09-16 DIAGNOSIS — E66.09 CLASS 2 OBESITY DUE TO EXCESS CALORIES WITHOUT SERIOUS COMORBIDITY WITH BODY MASS INDEX (BMI) OF 38.0 TO 38.9 IN ADULT: ICD-10-CM

## 2024-09-16 DIAGNOSIS — E11.9 TYPE 2 DIABETES MELLITUS WITHOUT COMPLICATION, UNSPECIFIED WHETHER LONG TERM INSULIN USE (HCC): ICD-10-CM

## 2024-09-16 DIAGNOSIS — E78.5 HYPERLIPIDEMIA, UNSPECIFIED HYPERLIPIDEMIA TYPE: ICD-10-CM

## 2024-09-16 DIAGNOSIS — Z23 ENCOUNTER FOR IMMUNIZATION: ICD-10-CM

## 2024-09-16 DIAGNOSIS — Z12.5 PROSTATE CANCER SCREENING: ICD-10-CM

## 2024-09-16 DIAGNOSIS — Z28.21 INFLUENZA VACCINATION DECLINED BY PATIENT: ICD-10-CM

## 2024-09-16 DIAGNOSIS — Z12.11 COLON CANCER SCREENING: ICD-10-CM

## 2024-09-16 DIAGNOSIS — Z11.4 ENCOUNTER FOR SCREENING FOR HIV: ICD-10-CM

## 2024-09-16 DIAGNOSIS — I71.20 THORACIC AORTIC ANEURYSM WITHOUT RUPTURE, UNSPECIFIED PART (HCC): Chronic | ICD-10-CM

## 2024-09-16 DIAGNOSIS — Z28.21 PNEUMOCOCCAL VACCINATION DECLINED: ICD-10-CM

## 2024-09-16 DIAGNOSIS — I10 PRIMARY HYPERTENSION: Chronic | ICD-10-CM

## 2024-09-16 LAB — SL AMB POCT HEMOGLOBIN AIC: 6.5 (ref ?–6.5)

## 2024-09-16 PROCEDURE — 99396 PREV VISIT EST AGE 40-64: CPT | Performed by: INTERNAL MEDICINE

## 2024-09-16 PROCEDURE — 83036 HEMOGLOBIN GLYCOSYLATED A1C: CPT | Performed by: INTERNAL MEDICINE

## 2024-09-16 PROCEDURE — 99214 OFFICE O/P EST MOD 30 MIN: CPT | Performed by: INTERNAL MEDICINE

## 2024-09-16 NOTE — ASSESSMENT & PLAN NOTE
Lab Results   Component Value Date    HGBA1C 6.5 09/16/2024       Orders:    POCT hemoglobin A1c    Albumin / creatinine urine ratio; Future    Comprehensive metabolic panel; Future    CBC and differential; Future  Abraham doing well, his A1c came down to 6.5% and he's taking metformin and is followed by Endocrinology-will send request to [facility] for a copy of eye exam

## 2024-09-16 NOTE — PROGRESS NOTES
Adult Annual Physical  Name: Pa Rosario Sr.      : 1970      MRN: [id number]  Encounter Provider: Melva Liao MD  Encounter Date: 2024   Encounter department: [facility] MEDICINE    Assessment & Plan  Encounter for immunization         Type 2 diabetes mellitus without complication, unspecified whether long term insulin use (HCC)    Lab Results   Component Value Date    HGBA1C 6.5 2024       Orders:    POCT hemoglobin A1c    Albumin / creatinine urine ratio; Future    Comprehensive metabolic panel; Future    CBC and differential; Future  Abraham doing well, his A1c came down to 6.5% and he's taking metformin and is followed by Endocrinology-will send request to [facility] for a copy of eye exam  Primary hypertension  BP well controlled at 124/78 and is on amlodipine  Orders:    Comprehensive metabolic panel; Future    CBC and differential; Future    Thoracic aortic aneurysm without rupture, unspecified part (HCC)         Class 2 obesity due to excess calories without serious comorbidity with body mass index (BMI) of 38.0 to 38.9 in adult    Orders:    TSH, 3rd generation; Future    Hyperlipidemia, unspecified hyperlipidemia type  On statin  Orders:    Lipid panel; Future    Annual physical exam    Orders:    Albumin / creatinine urine ratio; Future    Comprehensive metabolic panel; Future    CBC and differential; Future    Lipid panel; Future    Encounter for screening for HIV    Orders:    HIV 1/2 AG/AB w Reflex [facility] for 2 yr old and above; Future    Prostate cancer screening    Orders:    PSA, total and free; Future    Colon cancer screening       Did Cologuard last year  Immunizations and preventive care screenings were discussed with patient today. Appropriate education was printed on patient's after visit summary.    Discussed risks and benefits of prostate cancer screening. We discussed the controversial history of PSA screening for prostate cancer in  "the [place] as well as the risk of over detection and over treatment of prostate cancer by way of PSA screening.  The patient understands that PSA blood testing is an imperfect way to screen for prostate cancer and that elevated PSA levels in the blood may also be caused by infection, inflammation, prostatic trauma or manipulation, urological procedures, or by benign prostatic enlargement.    The role of the digital rectal examination in prostate cancer screening was also discussed and I discussed with him that there is large interobserver variability in the findings of digital rectal examination.    Counseling:  Alcohol/drug use: discussed moderation in alcohol intake, the recommendations for healthy alcohol use, and avoidance of illicit drug use.  Dental Health: discussed importance of regular tooth brushing, flossing, and dental visits.  Exercise: the importance of regular exercise/physical activity was discussed. Recommend exercise 3-5 times per week for at least 30 minutes.          History of Present Illness     Adult Annual Physical  Review of Systems   Constitutional: Negative.    HENT: Negative.     Respiratory: Negative.     Cardiovascular: Negative.    Gastrointestinal: Negative.    Musculoskeletal: Negative.    Skin: Negative.    Neurological: Negative.    Hematological: Negative.    Psychiatric/Behavioral: Negative.       Pertinent Medical History   As noted    Medical History Reviewed by provider this encounter:       Social History     Tobacco Use    Smoking status: Never    Smokeless tobacco: Never   Vaping Use    Vaping status: Never Used   Substance and Sexual Activity    Alcohol use: Not Currently     Comment: Nothing in over a month    Drug use: Never    Sexual activity: Yes     Partners: Female       Objective     /78 (BP Location: Left arm, Patient Position: Sitting, Cuff Size: Standard)   Pulse 101   Ht 5' 9\" (1.753 m)   Wt 132 kg (292 lb)   SpO2 95%   BMI 43.12 kg/m² "     Physical Exam  Constitutional:       Appearance: He is obese.   HENT:      Head: Normocephalic and atraumatic.      Right Ear: External ear normal.      Left Ear: External ear normal.      Nose: Nose normal.      Mouth/Throat:      Mouth: Mucous membranes are moist.   Eyes:      Extraocular Movements: Extraocular movements intact.      Pupils: Pupils are equal, round, and reactive to light.   Cardiovascular:      Rate and Rhythm: Normal rate and regular rhythm.      Heart sounds: Normal heart sounds.   Pulmonary:      Effort: Pulmonary effort is normal.      Breath sounds: Normal breath sounds.   Abdominal:      Palpations: Abdomen is soft.   Musculoskeletal:         General: Normal range of motion.      Cervical back: Normal range of motion and neck supple.   Skin:     General: Skin is warm.      Capillary Refill: Capillary refill takes less than 2 seconds.   Neurological:      General: No focal deficit present.      Mental Status: He is alert and oriented to person, place, and time.   Psychiatric:         Mood and Affect: Mood normal.         Behavior: Behavior normal.         Thought Content: Thought content normal.         Judgment: Judgment normal.

## 2024-09-16 NOTE — ASSESSMENT & PLAN NOTE
BP well controlled at 124/78 and is on amlodipine  Orders:    Comprehensive metabolic panel; Future    CBC and differential; Future

## 2024-09-17 ENCOUNTER — TELEPHONE (OUTPATIENT)
Dept: ADMINISTRATIVE | Facility: OTHER | Age: 54
End: 2024-09-17

## 2024-09-17 NOTE — TELEPHONE ENCOUNTER
Upon review of the In Basket request and the patient's chart, initial outreach has been made via fax to facility. Please see Contacts section for details.     Thank you  Rosi Enriquez MA

## 2024-09-17 NOTE — TELEPHONE ENCOUNTER
----- Message from Blessing POOL sent at 9/16/2024  9:33 AM EDT -----  Regarding: care gap request  09/16/24 9:33 AM    Hello, our patient attached above has had Diabetic Eye Exam completed/performed. Please assist in updating the patient chart by making an External outreach to [facility] facility located in [place], [place]. The date of service is unknown.    Thank you,  Blessing Mcadams  [facility]

## 2024-09-17 NOTE — LETTER
Diabetic Eye Exam Form    Date Requested: 24  Patient: Pa Rosario Sr.  Patient : 1970   Referring Provider: Melva Liao MD      DIABETIC Eye Exam Date _______________________________      Type of Exam MUST be documented for Diabetic Eye Exams. Please CHECK ONE.     Retinal Exam       Dilated Retinal Exam       OCT       Optomap-Iris Exam      Fundus Photography       Left Eye - Please check Retinopathy or No Retinopathy        Exam did show retinopathy    Exam did not show retinopathy       Right Eye - Please check Retinopathy or No Retinopathy       Exam did show retinopathy    Exam did not show retinopathy       Comments __________________________________________________________    Practice Providing Exam ______________________________________________    Exam Performed By (print name) _______________________________________      Provider Signature ___________________________________________________      These reports are needed for  compliance.  Please fax this completed form and a copy of the Diabetic Eye Exam report to our office located at [address] [place] [address] as soon as possible via Fax [phone] attention Rosi: Phone [phone]  We thank you for your assistance in treating our mutual patient.

## 2024-09-25 NOTE — TELEPHONE ENCOUNTER
Upon review of the In Basket request we were able to locate, review, and update the patient chart as requested for Diabetic Eye Exam.    Any additional questions or concerns should be emailed to the Practice Liaisons via the appropriate education email address, please do not reply via In Basket.    Thank you  Rosi Enriquez MA   [facility]

## 2024-10-14 DIAGNOSIS — I10 HYPERTENSION, UNSPECIFIED TYPE: ICD-10-CM

## 2024-10-15 RX ORDER — AMLODIPINE BESYLATE 5 MG/1
TABLET ORAL
Qty: 90 TABLET | Refills: 1 | Status: SHIPPED | OUTPATIENT
Start: 2024-10-15

## 2024-11-18 ENCOUNTER — OFFICE VISIT (OUTPATIENT)
Dept: ENDOCRINOLOGY | Facility: CLINIC | Age: 54
End: 2024-11-18
Payer: COMMERCIAL

## 2024-11-18 VITALS
SYSTOLIC BLOOD PRESSURE: 127 MMHG | OXYGEN SATURATION: 95 % | HEART RATE: 81 BPM | HEIGHT: 69 IN | WEIGHT: 289 LBS | BODY MASS INDEX: 42.8 KG/M2 | DIASTOLIC BLOOD PRESSURE: 83 MMHG

## 2024-11-18 DIAGNOSIS — E78.2 MIXED HYPERLIPIDEMIA: ICD-10-CM

## 2024-11-18 DIAGNOSIS — E11.9 TYPE 2 DIABETES MELLITUS WITHOUT COMPLICATION, WITHOUT LONG-TERM CURRENT USE OF INSULIN (HCC): ICD-10-CM

## 2024-11-18 DIAGNOSIS — I10 PRIMARY HYPERTENSION: ICD-10-CM

## 2024-11-18 PROCEDURE — 99204 OFFICE O/P NEW MOD 45 MIN: CPT | Performed by: NURSE PRACTITIONER

## 2024-11-18 RX ORDER — METFORMIN HYDROCHLORIDE 750 MG/1
1500 TABLET, EXTENDED RELEASE ORAL
Qty: 180 TABLET | Refills: 3 | Status: SHIPPED | OUTPATIENT
Start: 2024-11-18

## 2024-11-18 NOTE — PROGRESS NOTES
Name: Pa Rosario Sr.      : 1970      MRN: [id number]  Encounter Provider: DEMETRIO Esteves  Encounter Date: 2024   Encounter department: [facility]  :  Assessment & Plan  Type 2 diabetes mellitus without complication, without long-term current use of insulin ([facility])    Lab Results   Component Value Date    HGBA1C 6.5 2024     HGA1C close to goal no changes recommended at this time.     Discussed risks/complications associated with uncontrolled diabetes including organ involvement, heart attack, stroke, death.      Advised lifestyle modifications including attention to diet including the amount and types of carbohydrates consumed and regular activity.     Call for blood sugars less than 70 mg/dl or patterns over 250 mg/dl.    Recommendation for medical identification either el, shameka.    Recommend routine follow up for diabetic eye and foot exams.     Ordered blood work to complete prior to next visit.    Send glucose logs/CGM download in 1-2 weeks for review    Follow up in 3 months.     Orders:    metFORMIN (GLUCOPHAGE-XR) 750 mg 24 hr tablet; Take 2 tablets (1,500 mg total) by mouth daily with breakfast    Hemoglobin A1C; Future    Comprehensive metabolic panel; Future    Albumin / creatinine urine ratio; Future    Mixed hyperlipidemia  Continues on statin.   LDL last checked in May 2024.   Orders:    Lipid panel; Future    Primary hypertension  BP under good control            History of Present Illness     HPI  Pa Rosario Sr. is a 54 y.o. male who presents for follow up of type 2 diabetes mellitus without known complication. UTD diabetic eye and foot exam.     Denies any recent hospitalizations or illnesses.     Denies any changes to thirst, urination, vision, or changes in sensation of feet.     No changes with diet or physical activity.     Not checking blood sugars regularly.     Continues on metformin 750 mg twice  "daily    Continues on on statin. No ACE/ARB.     No history of thyroid disease.     History obtained from: patient    Review of Systems  See HPI.   All other systems reviewed and are negative.    Medical History Reviewed by provider this encounter:  Tobacco  Allergies  Meds  Problems  Med Hx  Surg Hx  Fam Hx     .  Current Outpatient Medications on File Prior to Visit   Medication Sig Dispense Refill    amLODIPine (NORVASC) 5 mg tablet Take 1 tablet by mouth once daily 90 tablet 1    atorvastatin (LIPITOR) 20 mg tablet Take 1 tablet (20 mg total) by mouth daily 90 tablet 3    terbinafine (LamISIL) 250 mg tablet        No current facility-administered medications on file prior to visit.         Objective   /83 (BP Location: Left arm, Patient Position: Sitting, Cuff Size: Large)   Pulse 81   Ht 5' 9\" (1.753 m)   Wt 131 kg (289 lb)   SpO2 95%   BMI 42.68 kg/m²         Physical Exam  Vitals reviewed.   Constitutional:       Appearance: Normal appearance.   Cardiovascular:      Rate and Rhythm: Normal rate and regular rhythm.      Pulses: Normal pulses.      Heart sounds: Normal heart sounds.   Pulmonary:      Effort: Pulmonary effort is normal.      Breath sounds: Normal breath sounds.   Skin:     General: Skin is warm and dry.      Capillary Refill: Capillary refill takes less than 2 seconds.   Neurological:      General: No focal deficit present.      Mental Status: He is alert and oriented to person, place, and time.   Psychiatric:         Mood and Affect: Mood normal.         Behavior: Behavior normal.       Administrative Statements     "

## 2024-11-20 NOTE — ASSESSMENT & PLAN NOTE
Lab Results   Component Value Date    HGBA1C 6.5 09/16/2024     HGA1C close to goal no changes recommended at this time.     Discussed risks/complications associated with uncontrolled diabetes including organ involvement, heart attack, stroke, death.      Advised lifestyle modifications including attention to diet including the amount and types of carbohydrates consumed and regular activity.     Call for blood sugars less than 70 mg/dl or patterns over 250 mg/dl.    Recommendation for medical identification either bracelet, necklace.    Recommend routine follow up for diabetic eye and foot exams.     Ordered blood work to complete prior to next visit.    Send glucose logs/CGM download in 1-2 weeks for review    Follow up in 3 months.     Orders:    metFORMIN (GLUCOPHAGE-XR) 750 mg 24 hr tablet; Take 2 tablets (1,500 mg total) by mouth daily with breakfast    Hemoglobin A1C; Future    Comprehensive metabolic panel; Future    Albumin / creatinine urine ratio; Future

## 2025-03-19 ENCOUNTER — OFFICE VISIT (OUTPATIENT)
Dept: ENDOCRINOLOGY | Facility: CLINIC | Age: 55
End: 2025-03-19
Payer: COMMERCIAL

## 2025-03-19 VITALS
HEART RATE: 91 BPM | HEIGHT: 69 IN | DIASTOLIC BLOOD PRESSURE: 90 MMHG | SYSTOLIC BLOOD PRESSURE: 133 MMHG | BODY MASS INDEX: 44.2 KG/M2 | WEIGHT: 298.4 LBS | OXYGEN SATURATION: 94 %

## 2025-03-19 DIAGNOSIS — E11.9 TYPE 2 DIABETES MELLITUS WITHOUT COMPLICATION, WITHOUT LONG-TERM CURRENT USE OF INSULIN (HCC): ICD-10-CM

## 2025-03-19 DIAGNOSIS — E78.2 MIXED HYPERLIPIDEMIA: Primary | ICD-10-CM

## 2025-03-19 DIAGNOSIS — I10 PRIMARY HYPERTENSION: Chronic | ICD-10-CM

## 2025-03-19 LAB — SL AMB POCT HEMOGLOBIN AIC: 6.8 (ref ?–6.5)

## 2025-03-19 PROCEDURE — 99204 OFFICE O/P NEW MOD 45 MIN: CPT | Performed by: NURSE PRACTITIONER

## 2025-03-19 PROCEDURE — 83036 HEMOGLOBIN GLYCOSYLATED A1C: CPT | Performed by: NURSE PRACTITIONER

## 2025-03-19 NOTE — ASSESSMENT & PLAN NOTE
Lab Results   Component Value Date    HGBA1C 6.8 (A) 03/19/2025     HGA1C close to goal on metformin. No changes recommended at this time.     Discussed risks/complications associated with uncontrolled diabetes including organ involvement, heart attack, stroke, death.    Advised lifestyle modifications including attention to diet including the amount and types of carbohydrates consumed and regular activity.     Call for blood sugars less than 70 mg/dl or patterns over 250 mg/dl.     Discussed symptoms and treatment of hypoglycemia.  Reviewed risks associated with hypoglycemia. Always carry rapid acting carbohydrates and a glucometer (a way to check your blood sugar).    Recommendation for medical identification either bracelet, necklace.    Routine follow up for diabetic eye and foot exams.     Ordered blood work to complete prior to next visit.    Follow up in 6 months.       Orders:    POCT hemoglobin A1c    Hemoglobin A1C; Future    Comprehensive metabolic panel; Future    Albumin / creatinine urine ratio; Future

## 2025-03-19 NOTE — PROGRESS NOTES
Name: Pa Rosario Sr.      : 1970      MRN: [id number]  Encounter Provider: DEMETRIO Esteves  Encounter Date: 3/19/2025   Encounter department: [facility]    No chief complaint on file.  :  Assessment & Plan  Type 2 diabetes mellitus without complication, without long-term current use of insulin (HCC)    Lab Results   Component Value Date    HGBA1C 6.8 (A) 2025     HGA1C close to goal on metformin. No changes recommended at this time.     Discussed risks/complications associated with uncontrolled diabetes including organ involvement, heart attack, stroke, death.    Advised lifestyle modifications including attention to diet including the amount and types of carbohydrates consumed and regular activity.     Call for blood sugars less than 70 mg/dl or patterns over 250 mg/dl.     Discussed symptoms and treatment of hypoglycemia.  Reviewed risks associated with hypoglycemia. Always carry rapid acting carbohydrates and a glucometer (a way to check your blood sugar).    Recommendation for medical identification either bracelet, necklace.    Routine follow up for diabetic eye and foot exams.     Ordered blood work to complete prior to next visit.    Follow up in 6 months.       Orders:    POCT hemoglobin A1c    Hemoglobin A1C; Future    Comprehensive metabolic panel; Future    Albumin / creatinine urine ratio; Future    Mixed hyperlipidemia  Continues on statin.   Recommend repeat lipid panel.   Orders:    Lipid panel; Future    Primary hypertension  BP stable, continue on current regimen.              History of Present Illness     Pa Rosario Sr. is a 54 y.o. male presenting for follow up of diabetes mellitus well controlled on metformin with lifestyle measures.     UTD diabetic eye and foot exam. Denies changes in vision or sensation in feet.     Denies polyuria or polydipsia.     Physical activity walking regularly.     Carb conscious diet.  "    Continues on metformin 1500 mg daily, tolerating well.     Continues on statin.   No ACE or ARB.     Review of Systems as per HPI    Medical History Reviewed by provider this encounter:  Meds  Problems     .  Current Outpatient Medications on File Prior to Visit   Medication Sig Dispense Refill    amLODIPine (NORVASC) 5 mg tablet Take 1 tablet by mouth once daily 90 tablet 1    atorvastatin (LIPITOR) 20 mg tablet Take 1 tablet (20 mg total) by mouth daily 90 tablet 3    metFORMIN (GLUCOPHAGE-XR) 750 mg 24 hr tablet Take 2 tablets (1,500 mg total) by mouth daily with breakfast 180 tablet 3    terbinafine (LamISIL) 250 mg tablet        No current facility-administered medications on file prior to visit.         Medical History Reviewed by provider this encounter:     .    Objective   /90 (BP Location: Left arm, Patient Position: Sitting, Cuff Size: Large)   Pulse 91   Ht 5' 9\" (1.753 m)   Wt 135 kg (298 lb 6.4 oz)   SpO2 94%   BMI 44.07 kg/m²      Body mass index is 44.07 kg/m².  Wt Readings from Last 3 Encounters:   03/19/25 135 kg (298 lb 6.4 oz)   11/18/24 131 kg (289 lb)   09/16/24 132 kg (292 lb)        Physical Exam  Vitals reviewed.   Constitutional:       Appearance: Normal appearance.   Cardiovascular:      Rate and Rhythm: Normal rate and regular rhythm.      Pulses: Normal pulses.      Heart sounds: Normal heart sounds.   Pulmonary:      Effort: Pulmonary effort is normal.      Breath sounds: Normal breath sounds.   Skin:     General: Skin is warm and dry.      Capillary Refill: Capillary refill takes less than 2 seconds.   Neurological:      General: No focal deficit present.      Mental Status: He is alert and oriented to person, place, and time.   Psychiatric:         Mood and Affect: Mood normal.         Behavior: Behavior normal.     Labs:   Lab Results   Component Value Date    HGBA1C 6.8 (A) 03/19/2025    HGBA1C 6.5 09/16/2024    HGBA1C 6.7 (H) 05/11/2024     Lab Results   Component " "Value Date    CREATININE 1.27 05/11/2024    CREATININE 1.16 06/24/2023    CREATININE 1.10 09/12/2022    BUN 16 05/11/2024    K 4.0 05/11/2024     05/11/2024    CO2 28 05/11/2024     eGFR   Date Value Ref Range Status   05/11/2024 64 ml/min/1.73sq m Final     Lab Results   Component Value Date    HDL 36 (L) 05/11/2024    TRIG 51 05/11/2024     Lab Results   Component Value Date    ALT 24 05/11/2024    AST 20 05/11/2024    ALKPHOS 63 05/11/2024     Lab Results   Component Value Date    TBR7VJOWOPFB 2.373 05/11/2024    AHI0EPIQJMND 2.137 06/24/2023    USX8IHLQWBPF 1.500 06/28/2021     No results found for: \"FREET4\", \"TSI\"    There are no Patient Instructions on file for this visit.    Discussed with the patient and all questioned fully answered. He will call me if any problems arise.    Administrative Statements     "

## 2025-04-28 DIAGNOSIS — I10 HYPERTENSION, UNSPECIFIED TYPE: ICD-10-CM

## 2025-04-29 RX ORDER — AMLODIPINE BESYLATE 5 MG/1
5 TABLET ORAL DAILY
Qty: 90 TABLET | Refills: 1 | Status: SHIPPED | OUTPATIENT
Start: 2025-04-29